# Patient Record
Sex: MALE | Race: WHITE | NOT HISPANIC OR LATINO | URBAN - METROPOLITAN AREA
[De-identification: names, ages, dates, MRNs, and addresses within clinical notes are randomized per-mention and may not be internally consistent; named-entity substitution may affect disease eponyms.]

---

## 2017-12-09 ENCOUNTER — EMERGENCY (EMERGENCY)
Facility: HOSPITAL | Age: 82
LOS: 1 days | Discharge: HOME | End: 2017-12-09
Attending: EMERGENCY MEDICINE | Admitting: EMERGENCY MEDICINE
Payer: MEDICARE

## 2017-12-09 VITALS
DIASTOLIC BLOOD PRESSURE: 77 MMHG | OXYGEN SATURATION: 100 % | SYSTOLIC BLOOD PRESSURE: 134 MMHG | TEMPERATURE: 98 F | HEART RATE: 75 BPM | RESPIRATION RATE: 18 BRPM

## 2017-12-09 VITALS
SYSTOLIC BLOOD PRESSURE: 139 MMHG | HEART RATE: 78 BPM | OXYGEN SATURATION: 97 % | WEIGHT: 130.07 LBS | TEMPERATURE: 99 F | HEIGHT: 64 IN | DIASTOLIC BLOOD PRESSURE: 67 MMHG | RESPIRATION RATE: 17 BRPM

## 2017-12-09 PROCEDURE — 70450 CT HEAD/BRAIN W/O DYE: CPT

## 2017-12-09 PROCEDURE — 70450 CT HEAD/BRAIN W/O DYE: CPT | Mod: 26

## 2017-12-09 PROCEDURE — 72125 CT NECK SPINE W/O DYE: CPT | Mod: 26

## 2017-12-09 PROCEDURE — 72125 CT NECK SPINE W/O DYE: CPT

## 2017-12-09 PROCEDURE — 99284 EMERGENCY DEPT VISIT MOD MDM: CPT | Mod: 25

## 2017-12-09 PROCEDURE — 99284 EMERGENCY DEPT VISIT MOD MDM: CPT

## 2017-12-09 NOTE — ED ADULT TRIAGE NOTE - CHIEF COMPLAINT QUOTE
"I tripped and fell going to the bathroom." denies LOC. denies blood thinners. denies nausea, vomiting, dizziness, chest pain, sob, palpitations. lac noted to forehead

## 2017-12-09 NOTE — ED PROVIDER NOTE - ENMT, MLM
Airway patent, Nasal mucosa clear. Mouth with normal mucosa. Throat has no vesicles, no oropharyngeal exudates and uvula is midline. superficial abrasion above rigth orbit

## 2017-12-09 NOTE — ED ADULT NURSE NOTE - OBJECTIVE STATEMENT
Present to ER with c/o of fall. Pt states he fell in his bathroom with a head laceration.  Pt also states he went to bathroom and might have slipped in bathroom mat and hitting his forehead. Denies any LOC, confusion, dizziness, blurry vision and chest pain.

## 2017-12-09 NOTE — ED PROVIDER NOTE - OBJECTIVE STATEMENT
88 y/o M s/p trip and fall in his bathroom with a head laceration.  Pt stats he went to bathroom and thinks he slipped on the bathroom mat, hitting his forehead on the bathroom sink.  pt denies LOC, confusion, dizziness, blurry vision, chest pain.

## 2021-10-20 ENCOUNTER — EMERGENCY (EMERGENCY)
Facility: HOSPITAL | Age: 86
LOS: 1 days | Discharge: HOME | End: 2021-10-20
Attending: EMERGENCY MEDICINE | Admitting: EMERGENCY MEDICINE
Payer: MEDICARE

## 2021-10-20 VITALS
TEMPERATURE: 99 F | DIASTOLIC BLOOD PRESSURE: 63 MMHG | HEIGHT: 64 IN | RESPIRATION RATE: 18 BRPM | WEIGHT: 184.97 LBS | SYSTOLIC BLOOD PRESSURE: 141 MMHG | OXYGEN SATURATION: 97 % | HEART RATE: 67 BPM

## 2021-10-20 VITALS
TEMPERATURE: 98 F | HEART RATE: 75 BPM | OXYGEN SATURATION: 95 % | RESPIRATION RATE: 18 BRPM | DIASTOLIC BLOOD PRESSURE: 68 MMHG | SYSTOLIC BLOOD PRESSURE: 129 MMHG

## 2021-10-20 PROBLEM — S42.90XA FRACTURE OF UNSPECIFIED SHOULDER GIRDLE, PART UNSPECIFIED, INITIAL ENCOUNTER FOR CLOSED FRACTURE: Chronic | Status: ACTIVE | Noted: 2017-12-09

## 2021-10-20 PROBLEM — C67.9 MALIGNANT NEOPLASM OF BLADDER, UNSPECIFIED: Chronic | Status: ACTIVE | Noted: 2017-12-09

## 2021-10-20 PROBLEM — Z95.5 PRESENCE OF CORONARY ANGIOPLASTY IMPLANT AND GRAFT: Chronic | Status: ACTIVE | Noted: 2017-12-09

## 2021-10-20 PROCEDURE — 99283 EMERGENCY DEPT VISIT LOW MDM: CPT | Mod: 25

## 2021-10-20 PROCEDURE — 87075 CULTR BACTERIA EXCEPT BLOOD: CPT

## 2021-10-20 PROCEDURE — 10061 I&D ABSCESS COMP/MULTIPLE: CPT

## 2021-10-20 PROCEDURE — 10060 I&D ABSCESS SIMPLE/SINGLE: CPT

## 2021-10-20 PROCEDURE — 87205 SMEAR GRAM STAIN: CPT

## 2021-10-20 PROCEDURE — 87070 CULTURE OTHR SPECIMN AEROBIC: CPT

## 2021-10-20 PROCEDURE — 87077 CULTURE AEROBIC IDENTIFY: CPT

## 2021-10-20 RX ORDER — CEFUROXIME AXETIL 250 MG
500 TABLET ORAL ONCE
Refills: 0 | Status: COMPLETED | OUTPATIENT
Start: 2021-10-20 | End: 2021-10-20

## 2021-10-20 RX ADMIN — Medication 500 MILLIGRAM(S): at 12:04

## 2021-10-20 NOTE — ED PROVIDER NOTE - PROGRESS NOTE DETAILS
spoke with ENT on call Dr. Chandler, case discuused, aware wound was packed, recommend duricef due to patient allergic to clinda and levaquin, will see in office tommorrow

## 2021-10-20 NOTE — ED PROVIDER NOTE - NSFOLLOWUPINSTRUCTIONS_ED_ALL_ED_FT
Call ENT Dr. Hurley office today to make appointment for tomorrow.    WHAT YOU NEED TO KNOW:    Epidermal inclusion cysts are the most common skin cysts in adults. These cysts are usually round, firm lumps filled with a cheese-like material called keratin. They are also called epidermoid, keratin, or sebaceous cysts. They can be found almost anywhere on your body. The cysts are most common on the face, back, neck, chest, and around your ears. They can be caused by blocked hair follicle and oil gland ducts in your skin. The cysts can grow larger and make it hard for you to sit or walk if they are on your legs or back. Epidermal inclusion cysts may grow slowly but are not cancer.    DISCHARGE INSTRUCTIONS:    Call your doctor or dermatologist if:   •Your cyst becomes swollen, red, and painful.      •Your cyst is large and leads to trouble moving or a deformed area.      •You have questions or concerns about your condition or care.      Medicines:   •Antibiotics may be given to treat or prevent an infection.      •Take your medicine as directed. Contact your healthcare provider if you think your medicine is not helping or if you have side effects. Tell him of her if you are allergic to any medicine. Keep a list of the medicines, vitamins, and herbs you take. Include the amounts, and when and why you take them. Bring the list or the pill bottles to follow-up visits. Carry your medicine list with you in case of an emergency.      Follow up with your doctor or dermatologist as directed: Write down your questions so you remember to ask them at your visits.       © Copyright [employer] 2021           back to top                          © Copyright [employer] 2021

## 2021-10-20 NOTE — ED PROVIDER NOTE - CPE EDP NEURO NORM
Medications e-scribe to pharmacy of pt's choice. Patient was put on a wait list and will be contacted to schedule their next follow up appointment once the schedule is available. If the patient is in need of an appointment before their next visit please call the office at [phone]. After Visit Summary  mailed to patient.     SL - - -

## 2021-10-20 NOTE — ED PROVIDER NOTE - CLINICAL SUMMARY MEDICAL DECISION MAKING FREE TEXT BOX
sebaceous cyst of right ear now open and oozing blood, to drain and pack, call ENT, start antibiotics

## 2021-10-20 NOTE — ED ADULT NURSE NOTE - NSICDXPASTMEDICALHX_GEN_ALL_CORE_FT
PAST MEDICAL HISTORY:  Bladder cancer     Shoulder fracture     Stented coronary artery over 15 years ago as per patient

## 2021-10-20 NOTE — ED ADULT NURSE NOTE - OBJECTIVE STATEMENT
Patient is a 89yo M, BIBA, AAOx4, in NAD, VSS, complaining of bleeding at right ear.  Patient states, "I have had a growth there for several months and today it just started bleeding."  Patient denies any pain or injury at site, falls, fevers, chills, N/V, dizziness, changes in hearing, use of blood thinners, or any other complaints at this time.

## 2021-10-20 NOTE — ED PROVIDER NOTE - OBJECTIVE STATEMENT
89 male presents to ER by ambulance from home with report of right ear bleeding, patient states he has had a lump behind his ear for the past month, but it did not bother him until it started to bleed today.
No

## 2021-10-20 NOTE — ED PROVIDER NOTE - PATIENT PORTAL LINK FT
You can access the FollowMyHealth Patient Portal offered by [facility] by registering at the following website: [email or web address]. By joining [employer]’[facility] FollowMyHealth portal, you will also be able to view your health information using other applications (apps) compatible with our system.

## 2021-10-20 NOTE — ED PROVIDER NOTE - CARE PROVIDER_API CALL
Aleksey Hurley)  Facial Plastic and Reconstructive Surgery; Otolaryngology  [address] [place] [address]  Phone: [phone]  Fax: [phone]  Follow Up Time:

## 2021-10-20 NOTE — ED ADULT NURSE NOTE - NSIMPLEMENTINTERV_GEN_ALL_ED
Implemented All Fall with Harm Risk Interventions:  [place] to call system. Call bell, personal items and telephone within reach. Instruct patient to call for assistance. Room bathroom lighting operational. Non-slip footwear when patient is off stretcher. Physically safe environment: no spills, clutter or unnecessary equipment. Stretcher in lowest position, wheels locked, appropriate side rails in place. Provide visual cue, wrist band, yellow gown, etc. Monitor gait and stability. Monitor for mental status changes and reorient to person, place, and time. Review medications for side effects contributing to fall risk. Reinforce activity limits and safety measures with patient and family. Provide visual clues: red socks.
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
## 2021-11-24 ENCOUNTER — INPATIENT (INPATIENT)
Facility: HOSPITAL | Age: 86
LOS: 5 days | Discharge: NURSING, REHAB OR LONG-TERM CARE | DRG: 872 | End: 2021-11-30
Attending: INTERNAL MEDICINE | Admitting: STUDENT IN AN ORGANIZED HEALTH CARE EDUCATION/TRAINING PROGRAM
Payer: MEDICARE

## 2021-11-24 VITALS
HEIGHT: 64 IN | WEIGHT: 125 LBS | RESPIRATION RATE: 16 BRPM | TEMPERATURE: 100 F | HEART RATE: 95 BPM | SYSTOLIC BLOOD PRESSURE: 149 MMHG | OXYGEN SATURATION: 99 % | DIASTOLIC BLOOD PRESSURE: 76 MMHG

## 2021-11-24 LAB
ALBUMIN SERPL ELPH-MCNC: 3.6 G/DL — SIGNIFICANT CHANGE UP (ref 3.3–5)
ALP SERPL-CCNC: 50 U/L — SIGNIFICANT CHANGE UP (ref 40–120)
ALT FLD-CCNC: 21 U/L — SIGNIFICANT CHANGE UP (ref 12–78)
ANION GAP SERPL CALC-SCNC: 6 MMOL/L — SIGNIFICANT CHANGE UP (ref 5–17)
ANISOCYTOSIS BLD QL: SLIGHT — SIGNIFICANT CHANGE UP
APPEARANCE UR: ABNORMAL
APTT BLD: 28.8 SEC — SIGNIFICANT CHANGE UP (ref 27.5–35.5)
AST SERPL-CCNC: 32 U/L — SIGNIFICANT CHANGE UP (ref 15–37)
BACTERIA # UR AUTO: ABNORMAL
BASOPHILS # BLD AUTO: 0 K/UL — SIGNIFICANT CHANGE UP (ref 0–0.2)
BASOPHILS NFR BLD AUTO: 0 % — SIGNIFICANT CHANGE UP (ref 0–2)
BILIRUB SERPL-MCNC: 0.5 MG/DL — SIGNIFICANT CHANGE UP (ref 0.2–1.2)
BILIRUB UR-MCNC: NEGATIVE — SIGNIFICANT CHANGE UP
BUN SERPL-MCNC: 34 MG/DL — HIGH (ref 7–23)
CALCIUM SERPL-MCNC: 9.9 MG/DL — SIGNIFICANT CHANGE UP (ref 8.5–10.1)
CHLORIDE SERPL-SCNC: 107 MMOL/L — SIGNIFICANT CHANGE UP (ref 96–108)
CO2 SERPL-SCNC: 24 MMOL/L — SIGNIFICANT CHANGE UP (ref 22–31)
COLOR SPEC: YELLOW — SIGNIFICANT CHANGE UP
COMMENT - URINE: SIGNIFICANT CHANGE UP
CREAT SERPL-MCNC: 2.2 MG/DL — HIGH (ref 0.5–1.3)
DIFF PNL FLD: ABNORMAL
EOSINOPHIL # BLD AUTO: 0 K/UL — SIGNIFICANT CHANGE UP (ref 0–0.5)
EOSINOPHIL NFR BLD AUTO: 0 % — SIGNIFICANT CHANGE UP (ref 0–6)
EPI CELLS # UR: SIGNIFICANT CHANGE UP
GLUCOSE SERPL-MCNC: 112 MG/DL — HIGH (ref 70–99)
GLUCOSE UR QL: 100 MG/DL
GRAN CASTS # UR COMP ASSIST: ABNORMAL /LPF
HCT VFR BLD CALC: 35.2 % — LOW (ref 39–50)
HGB BLD-MCNC: 11.7 G/DL — LOW (ref 13–17)
INR BLD: 1.14 RATIO — SIGNIFICANT CHANGE UP (ref 0.88–1.16)
KETONES UR-MCNC: ABNORMAL
LACTATE SERPL-SCNC: 1.2 MMOL/L — SIGNIFICANT CHANGE UP (ref 0.7–2)
LEUKOCYTE ESTERASE UR-ACNC: NEGATIVE — SIGNIFICANT CHANGE UP
LYMPHOCYTES # BLD AUTO: 0.46 K/UL — LOW (ref 1–3.3)
LYMPHOCYTES # BLD AUTO: 4 % — LOW (ref 13–44)
MACROCYTES BLD QL: SLIGHT — SIGNIFICANT CHANGE UP
MANUAL SMEAR VERIFICATION: YES — SIGNIFICANT CHANGE UP
MCHC RBC-ENTMCNC: 32.1 PG — SIGNIFICANT CHANGE UP (ref 27–34)
MCHC RBC-ENTMCNC: 33.2 GM/DL — SIGNIFICANT CHANGE UP (ref 32–36)
MCV RBC AUTO: 96.4 FL — SIGNIFICANT CHANGE UP (ref 80–100)
MICROCYTES BLD QL: SLIGHT — SIGNIFICANT CHANGE UP
MONOCYTES # BLD AUTO: 0.34 K/UL — SIGNIFICANT CHANGE UP (ref 0–0.9)
MONOCYTES NFR BLD AUTO: 3 % — SIGNIFICANT CHANGE UP (ref 2–14)
NEUTROPHILS # BLD AUTO: 10.68 K/UL — HIGH (ref 1.8–7.4)
NEUTROPHILS NFR BLD AUTO: 90 % — HIGH (ref 43–77)
NEUTS BAND # BLD: 3 % — SIGNIFICANT CHANGE UP (ref 0–8)
NITRITE UR-MCNC: NEGATIVE — SIGNIFICANT CHANGE UP
NRBC # BLD: 0 — SIGNIFICANT CHANGE UP
NRBC # BLD: SIGNIFICANT CHANGE UP /100 WBCS (ref 0–0)
PH UR: 5 — SIGNIFICANT CHANGE UP (ref 5–8)
PLAT MORPH BLD: NORMAL — SIGNIFICANT CHANGE UP
PLATELET # BLD AUTO: 211 K/UL — SIGNIFICANT CHANGE UP (ref 150–400)
POIKILOCYTOSIS BLD QL AUTO: SLIGHT — SIGNIFICANT CHANGE UP
POTASSIUM SERPL-MCNC: 4.3 MMOL/L — SIGNIFICANT CHANGE UP (ref 3.5–5.3)
POTASSIUM SERPL-SCNC: 4.3 MMOL/L — SIGNIFICANT CHANGE UP (ref 3.5–5.3)
PROT SERPL-MCNC: 8.3 G/DL — SIGNIFICANT CHANGE UP (ref 6–8.3)
PROT UR-MCNC: 100
PROTHROM AB SERPL-ACNC: 13.3 SEC — SIGNIFICANT CHANGE UP (ref 10.6–13.6)
RBC # BLD: 3.65 M/UL — LOW (ref 4.2–5.8)
RBC # FLD: 19.8 % — HIGH (ref 10.3–14.5)
RBC BLD AUTO: SIGNIFICANT CHANGE UP
RBC CASTS # UR COMP ASSIST: SIGNIFICANT CHANGE UP /HPF (ref 0–4)
SCHISTOCYTES BLD QL AUTO: SLIGHT — SIGNIFICANT CHANGE UP
SODIUM SERPL-SCNC: 137 MMOL/L — SIGNIFICANT CHANGE UP (ref 135–145)
SP GR SPEC: 1.02 — SIGNIFICANT CHANGE UP (ref 1.01–1.02)
UROBILINOGEN FLD QL: NEGATIVE — SIGNIFICANT CHANGE UP
WBC # BLD: 11.48 K/UL — HIGH (ref 3.8–10.5)
WBC # FLD AUTO: 11.48 K/UL — HIGH (ref 3.8–10.5)
WBC UR QL: SIGNIFICANT CHANGE UP

## 2021-11-24 PROCEDURE — 71045 X-RAY EXAM CHEST 1 VIEW: CPT | Mod: 26

## 2021-11-24 PROCEDURE — 99285 EMERGENCY DEPT VISIT HI MDM: CPT

## 2021-11-24 PROCEDURE — 74176 CT ABD & PELVIS W/O CONTRAST: CPT | Mod: 26,ME

## 2021-11-24 PROCEDURE — 70450 CT HEAD/BRAIN W/O DYE: CPT | Mod: 26,ME

## 2021-11-24 PROCEDURE — G1004: CPT

## 2021-11-24 RX ORDER — SODIUM CHLORIDE 9 MG/ML
1000 INJECTION INTRAMUSCULAR; INTRAVENOUS; SUBCUTANEOUS ONCE
Refills: 0 | Status: COMPLETED | OUTPATIENT
Start: 2021-11-24 | End: 2021-11-24

## 2021-11-24 RX ORDER — SODIUM CHLORIDE 9 MG/ML
1000 INJECTION, SOLUTION INTRAVENOUS
Refills: 0 | Status: DISCONTINUED | OUTPATIENT
Start: 2021-11-24 | End: 2021-11-26

## 2021-11-24 RX ORDER — ACETAMINOPHEN 500 MG
650 TABLET ORAL ONCE
Refills: 0 | Status: COMPLETED | OUTPATIENT
Start: 2021-11-24 | End: 2021-11-24

## 2021-11-24 RX ORDER — CEFTRIAXONE 500 MG/1
1000 INJECTION, POWDER, FOR SOLUTION INTRAMUSCULAR; INTRAVENOUS ONCE
Refills: 0 | Status: COMPLETED | OUTPATIENT
Start: 2021-11-24 | End: 2021-11-24

## 2021-11-24 RX ADMIN — CEFTRIAXONE 100 MILLIGRAM(S): 500 INJECTION, POWDER, FOR SOLUTION INTRAMUSCULAR; INTRAVENOUS at 23:59

## 2021-11-24 RX ADMIN — Medication 650 MILLIGRAM(S): at 22:25

## 2021-11-24 RX ADMIN — SODIUM CHLORIDE 1000 MILLILITER(S): 9 INJECTION INTRAMUSCULAR; INTRAVENOUS; SUBCUTANEOUS at 22:26

## 2021-11-24 RX ADMIN — Medication 650 MILLIGRAM(S): at 23:20

## 2021-11-24 RX ADMIN — SODIUM CHLORIDE 1000 MILLILITER(S): 9 INJECTION INTRAMUSCULAR; INTRAVENOUS; SUBCUTANEOUS at 23:50

## 2021-11-24 NOTE — ED ADULT NURSE NOTE - NSICDXPASTMEDICALHX_GEN_ALL_CORE_FT
PAST MEDICAL HISTORY:  Bladder cancer     Parkinsons     Shoulder fracture     Stented coronary artery over 15 years ago as per patient

## 2021-11-24 NOTE — ED ADULT NURSE NOTE - NS ED NURSE RECORD ANOTHER VITAL SIGN
Yes D/w Cardio fellow  - likely rhabdo, but feels pt warrants tele monitoring. admit to cardiac tele Initially d/w Cardio fellow  - likely rhabdo, but feels pt warrants tele monitoring. however w/ elevated BHB, which can be starvation ketosis, less likely DKA, will call MICU for possible 7 lachman admission. MICU consulted and will see the pt No urine output on primafit. Will place Sommers monitoring of urine output in the setting of severe dehydration / rhabdo cleo: pt received at sign out from dr lainez as pending micu eval - declined tele setting, cards fellow accepted to cards tele for monitoring

## 2021-11-24 NOTE — ED PROVIDER NOTE - OBJECTIVE STATEMENT
89yo male bib ems s/p fall. pt was sitting in the dining room and the wife found hmi on the floor, unknown LOC, pt unable to get up , pt has no pain, no dizziness, no vomiting no cough or fever

## 2021-11-24 NOTE — ED ADULT NURSE NOTE - OBJECTIVE STATEMENT
Pt Biba from home for syncopal episode, witnessed by wife. Pt was walking and fell, doesn't recall event. Denies Head injury, no visible wounds to head or body. On arrival pt appears weak.

## 2021-11-24 NOTE — PROGRESS NOTE ADULT - SUBJECTIVE AND OBJECTIVE BOX
Consulted for elevated BUN/Cr    Chart reviewed    Unknown baseline creatinine  Check urine indices  Renal imaging  IVF trial with LR  Repeat labs in AM    Full consult note to follow, thank you

## 2021-11-25 DIAGNOSIS — R79.89 OTHER SPECIFIED ABNORMAL FINDINGS OF BLOOD CHEMISTRY: ICD-10-CM
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
## 2021-11-25 DIAGNOSIS — Z98.890 OTHER SPECIFIED POSTPROCEDURAL STATES: Chronic | ICD-10-CM

## 2021-11-25 DIAGNOSIS — R55 SYNCOPE AND COLLAPSE: ICD-10-CM

## 2021-11-25 DIAGNOSIS — R82.90 UNSPECIFIED ABNORMAL FINDINGS IN URINE: ICD-10-CM

## 2021-11-25 DIAGNOSIS — N39.0 URINARY TRACT INFECTION, SITE NOT SPECIFIED: ICD-10-CM

## 2021-11-25 DIAGNOSIS — K52.9 NONINFECTIVE GASTROENTERITIS AND COLITIS, UNSPECIFIED: ICD-10-CM

## 2021-11-25 DIAGNOSIS — Z29.9 ENCOUNTER FOR PROPHYLACTIC MEASURES, UNSPECIFIED: ICD-10-CM

## 2021-11-25 DIAGNOSIS — N17.9 ACUTE KIDNEY FAILURE, UNSPECIFIED: ICD-10-CM

## 2021-11-25 LAB
ALBUMIN SERPL ELPH-MCNC: 3.2 G/DL — LOW (ref 3.3–5)
ALP SERPL-CCNC: 43 U/L — SIGNIFICANT CHANGE UP (ref 40–120)
ALT FLD-CCNC: 18 U/L — SIGNIFICANT CHANGE UP (ref 12–78)
ANION GAP SERPL CALC-SCNC: 4 MMOL/L — LOW (ref 5–17)
APPEARANCE UR: ABNORMAL
AST SERPL-CCNC: 27 U/L — SIGNIFICANT CHANGE UP (ref 15–37)
BACTERIA # UR AUTO: ABNORMAL
BASOPHILS # BLD AUTO: 0.01 K/UL — SIGNIFICANT CHANGE UP (ref 0–0.2)
BASOPHILS NFR BLD AUTO: 0.1 % — SIGNIFICANT CHANGE UP (ref 0–2)
BILIRUB SERPL-MCNC: 0.4 MG/DL — SIGNIFICANT CHANGE UP (ref 0.2–1.2)
BILIRUB UR-MCNC: NEGATIVE — SIGNIFICANT CHANGE UP
BUN SERPL-MCNC: 30 MG/DL — HIGH (ref 7–23)
CALCIUM SERPL-MCNC: 9.1 MG/DL — SIGNIFICANT CHANGE UP (ref 8.5–10.1)
CHLORIDE SERPL-SCNC: 108 MMOL/L — SIGNIFICANT CHANGE UP (ref 96–108)
CHLORIDE UR-SCNC: 99 MMOL/L — SIGNIFICANT CHANGE UP
CO2 SERPL-SCNC: 26 MMOL/L — SIGNIFICANT CHANGE UP (ref 22–31)
COLOR SPEC: YELLOW — SIGNIFICANT CHANGE UP
COMMENT - URINE: SIGNIFICANT CHANGE UP
COMMENT - URINE: SIGNIFICANT CHANGE UP
CREAT ?TM UR-MCNC: 65 MG/DL — SIGNIFICANT CHANGE UP
CREAT SERPL-MCNC: 1.9 MG/DL — HIGH (ref 0.5–1.3)
DIFF PNL FLD: ABNORMAL
EOSINOPHIL # BLD AUTO: 0.02 K/UL — SIGNIFICANT CHANGE UP (ref 0–0.5)
EOSINOPHIL NFR BLD AUTO: 0.2 % — SIGNIFICANT CHANGE UP (ref 0–6)
EPI CELLS # UR: SIGNIFICANT CHANGE UP
GLUCOSE SERPL-MCNC: 100 MG/DL — HIGH (ref 70–99)
GLUCOSE UR QL: 100 MG/DL
GRAN CASTS # UR COMP ASSIST: ABNORMAL /LPF
HCT VFR BLD CALC: 32 % — LOW (ref 39–50)
HGB BLD-MCNC: 10.6 G/DL — LOW (ref 13–17)
IMM GRANULOCYTES NFR BLD AUTO: 7.1 % — HIGH (ref 0–1.5)
KETONES UR-MCNC: ABNORMAL
LEUKOCYTE ESTERASE UR-ACNC: NEGATIVE — SIGNIFICANT CHANGE UP
LYMPHOCYTES # BLD AUTO: 0.79 K/UL — LOW (ref 1–3.3)
LYMPHOCYTES # BLD AUTO: 7.6 % — LOW (ref 13–44)
MAGNESIUM SERPL-MCNC: 2.3 MG/DL — SIGNIFICANT CHANGE UP (ref 1.6–2.6)
MCHC RBC-ENTMCNC: 31.9 PG — SIGNIFICANT CHANGE UP (ref 27–34)
MCHC RBC-ENTMCNC: 33.1 GM/DL — SIGNIFICANT CHANGE UP (ref 32–36)
MCV RBC AUTO: 96.4 FL — SIGNIFICANT CHANGE UP (ref 80–100)
MONOCYTES # BLD AUTO: 1.7 K/UL — HIGH (ref 0–0.9)
MONOCYTES NFR BLD AUTO: 16.4 % — HIGH (ref 2–14)
NEUTROPHILS # BLD AUTO: 7.1 K/UL — SIGNIFICANT CHANGE UP (ref 1.8–7.4)
NEUTROPHILS NFR BLD AUTO: 68.6 % — SIGNIFICANT CHANGE UP (ref 43–77)
NITRITE UR-MCNC: NEGATIVE — SIGNIFICANT CHANGE UP
NRBC # BLD: 0 /100 WBCS — SIGNIFICANT CHANGE UP (ref 0–0)
OSMOLALITY UR: 504 MOSM/KG — SIGNIFICANT CHANGE UP (ref 50–1200)
PH UR: 5 — SIGNIFICANT CHANGE UP (ref 5–8)
PHOSPHATE SERPL-MCNC: 2.1 MG/DL — LOW (ref 2.5–4.5)
PLATELET # BLD AUTO: 191 K/UL — SIGNIFICANT CHANGE UP (ref 150–400)
POTASSIUM SERPL-MCNC: 3.6 MMOL/L — SIGNIFICANT CHANGE UP (ref 3.5–5.3)
POTASSIUM SERPL-SCNC: 3.6 MMOL/L — SIGNIFICANT CHANGE UP (ref 3.5–5.3)
PROT ?TM UR-MCNC: 88 MG/DL — HIGH (ref 0–12)
PROT SERPL-MCNC: 7 G/DL — SIGNIFICANT CHANGE UP (ref 6–8.3)
PROT UR-MCNC: 100
PROT/CREAT UR-RTO: 1.4 RATIO — HIGH (ref 0–0.2)
RBC # BLD: 3.32 M/UL — LOW (ref 4.2–5.8)
RBC # FLD: 19.8 % — HIGH (ref 10.3–14.5)
RBC CASTS # UR COMP ASSIST: SIGNIFICANT CHANGE UP /HPF (ref 0–4)
SARS-COV-2 RNA SPEC QL NAA+PROBE: SIGNIFICANT CHANGE UP
SODIUM SERPL-SCNC: 138 MMOL/L — SIGNIFICANT CHANGE UP (ref 135–145)
SODIUM UR-SCNC: 105 MMOL/L — SIGNIFICANT CHANGE UP
SP GR SPEC: 1.02 — SIGNIFICANT CHANGE UP (ref 1.01–1.02)
UROBILINOGEN FLD QL: NEGATIVE — SIGNIFICANT CHANGE UP
UUN UR-MCNC: 611 MG/DL — SIGNIFICANT CHANGE UP
WBC # BLD: 10.36 K/UL — SIGNIFICANT CHANGE UP (ref 3.8–10.5)
WBC # FLD AUTO: 10.36 K/UL — SIGNIFICANT CHANGE UP (ref 3.8–10.5)
WBC UR QL: SIGNIFICANT CHANGE UP

## 2021-11-25 PROCEDURE — 99223 1ST HOSP IP/OBS HIGH 75: CPT | Mod: GC

## 2021-11-25 PROCEDURE — 99222 1ST HOSP IP/OBS MODERATE 55: CPT

## 2021-11-25 PROCEDURE — 76775 US EXAM ABDO BACK WALL LIM: CPT | Mod: 26

## 2021-11-25 RX ORDER — HEPARIN SODIUM 5000 [USP'U]/ML
5000 INJECTION INTRAVENOUS; SUBCUTANEOUS EVERY 12 HOURS
Refills: 0 | Status: DISCONTINUED | OUTPATIENT
Start: 2021-11-25 | End: 2021-11-26

## 2021-11-25 RX ORDER — SODIUM,POTASSIUM PHOSPHATES 278-250MG
1 POWDER IN PACKET (EA) ORAL
Refills: 0 | Status: DISCONTINUED | OUTPATIENT
Start: 2021-11-25 | End: 2021-11-25

## 2021-11-25 RX ORDER — SODIUM,POTASSIUM PHOSPHATES 278-250MG
1 POWDER IN PACKET (EA) ORAL EVERY 6 HOURS
Refills: 0 | Status: COMPLETED | OUTPATIENT
Start: 2021-11-25 | End: 2021-11-26

## 2021-11-25 RX ORDER — ACETAMINOPHEN 500 MG
650 TABLET ORAL EVERY 6 HOURS
Refills: 0 | Status: DISCONTINUED | OUTPATIENT
Start: 2021-11-25 | End: 2021-11-30

## 2021-11-25 RX ORDER — SODIUM CHLORIDE 9 MG/ML
1000 INJECTION, SOLUTION INTRAVENOUS
Refills: 0 | Status: DISCONTINUED | OUTPATIENT
Start: 2021-11-25 | End: 2021-11-26

## 2021-11-25 RX ORDER — LANOLIN ALCOHOL/MO/W.PET/CERES
3 CREAM (GRAM) TOPICAL AT BEDTIME
Refills: 0 | Status: DISCONTINUED | OUTPATIENT
Start: 2021-11-25 | End: 2021-11-30

## 2021-11-25 RX ORDER — SACCHAROMYCES BOULARDII 250 MG
250 POWDER IN PACKET (EA) ORAL
Refills: 0 | Status: DISCONTINUED | OUTPATIENT
Start: 2021-11-25 | End: 2021-11-30

## 2021-11-25 RX ORDER — METRONIDAZOLE 500 MG
500 TABLET ORAL EVERY 8 HOURS
Refills: 0 | Status: DISCONTINUED | OUTPATIENT
Start: 2021-11-25 | End: 2021-11-27

## 2021-11-25 RX ORDER — CEFTRIAXONE 500 MG/1
1000 INJECTION, POWDER, FOR SOLUTION INTRAMUSCULAR; INTRAVENOUS EVERY 24 HOURS
Refills: 0 | Status: DISCONTINUED | OUTPATIENT
Start: 2021-11-25 | End: 2021-11-27

## 2021-11-25 RX ADMIN — Medication 1 PACKET(S): at 11:09

## 2021-11-25 RX ADMIN — Medication 250 MILLIGRAM(S): at 17:05

## 2021-11-25 RX ADMIN — SODIUM CHLORIDE 75 MILLILITER(S): 9 INJECTION, SOLUTION INTRAVENOUS at 14:20

## 2021-11-25 RX ADMIN — Medication 1 PACKET(S): at 17:05

## 2021-11-25 RX ADMIN — HEPARIN SODIUM 5000 UNIT(S): 5000 INJECTION INTRAVENOUS; SUBCUTANEOUS at 06:26

## 2021-11-25 RX ADMIN — Medication 100 MILLIGRAM(S): at 06:25

## 2021-11-25 RX ADMIN — CEFTRIAXONE 100 MILLIGRAM(S): 500 INJECTION, POWDER, FOR SOLUTION INTRAMUSCULAR; INTRAVENOUS at 23:43

## 2021-11-25 RX ADMIN — Medication 650 MILLIGRAM(S): at 05:10

## 2021-11-25 RX ADMIN — Medication 100 MILLIGRAM(S): at 21:46

## 2021-11-25 RX ADMIN — Medication 650 MILLIGRAM(S): at 04:46

## 2021-11-25 RX ADMIN — Medication 250 MILLIGRAM(S): at 06:25

## 2021-11-25 RX ADMIN — HEPARIN SODIUM 5000 UNIT(S): 5000 INJECTION INTRAVENOUS; SUBCUTANEOUS at 17:05

## 2021-11-25 RX ADMIN — Medication 100 MILLIGRAM(S): at 13:06

## 2021-11-25 RX ADMIN — SODIUM CHLORIDE 75 MILLILITER(S): 9 INJECTION, SOLUTION INTRAVENOUS at 00:10

## 2021-11-25 RX ADMIN — CEFTRIAXONE 1000 MILLIGRAM(S): 500 INJECTION, POWDER, FOR SOLUTION INTRAMUSCULAR; INTRAVENOUS at 00:30

## 2021-11-25 NOTE — H&P ADULT - PROBLEM SELECTOR PLAN 2
Patient came in after a fall from chair. Patient unaware of what happened. Mechanical fall vs. syncope. CT head showed no acute pathology.   - Continue to monitor   - Orthostatic Vital signs  - Fall precautious   - TELE monitor Patient came in after a fall from chair. Patient states that he lost balance and fell backward but does not remember details of what happened after. Mechanical fall vs. syncope. CT head showed no acute pathology - lateral ventricles dilated out of proportion to the sulcal prominence which could be due to central atrophy versus normal pressure hydrocephalus.  - Continue to monitor   - Orthostatic Vital signs  - Fall precautious   - TELE monitor, check CE's  - Cardiac murmur on exam - check 2D ECHO  - Neurology Consulted Dr. Rudolph  - PT evaluation

## 2021-11-25 NOTE — H&P ADULT - NSHPPHYSICALEXAM_GEN_ALL_CORE
VITALS:   T(C): 38 (11-24-21 @ 23:20), Max: 39.5 (11-24-21 @ 21:40)  HR: 85 (11-24-21 @ 23:20) (85 - 95)  BP: 111/56 (11-24-21 @ 23:20) (111/56 - 149/76)  RR: 16 (11-24-21 @ 23:20) (16 - 16)  SpO2: 97% (11-24-21 @ 23:20) (97% - 99%)    GENERAL: NAD, lying in bed comfortably  HEAD:  Atraumatic, Normocephalic  EYES: EOMI, PERRLA, conjunctiva and sclera clear  ENT: Moist mucous membranes  NECK: Supple, No JVD  CHEST/LUNG: Clear to auscultation bilaterally; No rales, rhonchi, wheezing, or rubs. Unlabored respirations  HEART: Regular rate and rhythm; No murmurs, rubs, or gallops  ABDOMEN: BSx4; Tender to palpation   EXTREMITIES:  2+ Peripheral Pulses, brisk capillary refill. No clubbing, cyanosis, or trace edema   NERVOUS SYSTEM:  A&Ox3, no focal deficits   SKIN: No rashes or lesions T(C): 38 (11-24-21 @ 23:20), Max: 39.5 (11-24-21 @ 21:40)  HR: 85 (11-24-21 @ 23:20) (85 - 95)  BP: 111/56 (11-24-21 @ 23:20) (111/56 - 149/76)  RR: 16 (11-24-21 @ 23:20) (16 - 16)  SpO2: 97% (11-24-21 @ 23:20) (97% - 99%)    GENERAL: NAD, lying in bed comfortably  HEAD:  Atraumatic, Normocephalic  EYES: EOMI, PERRLA, conjunctiva and sclera clear  ENT: Moist mucous membranes  NECK: Supple, No JVD  CHEST/LUNG: Clear to auscultation bilaterally; No rales, rhonchi, wheezing, or rubs. Unlabored respirations  HEART: Regular rate and rhythm; Systolic Murmur  ABDOMEN: BSx4; Tender to palpation   EXTREMITIES: 2+ Peripheral Pulses, brisk capillary refill. No clubbing, cyanosis, or trace edema   NERVOUS SYSTEM:  A&Ox3, no focal deficits   SKIN: No rashes or lesions

## 2021-11-25 NOTE — H&P ADULT - NSHPSOCIALHISTORY_GEN_ALL_CORE
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

## 2021-11-25 NOTE — PATIENT PROFILE ADULT - FUNCTIONAL SCREEN CURRENT LEVEL: COMMUNICATION, MLM
2 = difficulty understanding (not related to language barrier) Hears and communicates without difficulty as long as he can understand what you say/0 = understands/communicates without difficulty

## 2021-11-25 NOTE — CONSULT NOTE ADULT - SUBJECTIVE AND OBJECTIVE BOX
[facility]  INFECTIOUS DISEASES   [address] [place] [address]  Tel: [phone]     Fax: [phone]  ======================================================  MD Ioana Villanueva Kaushal, MD Cho, Michelle, MD   ======================================================    [id number]  HAYDER ANISHAAR     CC: Fall, Sigmoid Colitis     HPI:  89 yo man with PMH of CAD, Parkinson's Disease and Bladder cancer s/p tumor resection was admitted after a fall. No LOC. He denies any symptoms except for weakness and mild lower abdominal pain. No nausea, vomiting, diarrhea or dysuria. Had some work up done, showed possible left sided colitis. UA negative, Tmax in ED was 103 but he didn't notice high fever. No chills.   He has been started on ceftriaxone and metronidazole and ID was called for further recommendations.     PAST MEDICAL & SURGICAL HISTORY:  Shoulder fracture  Bladder cancer  Stented coronary artery  over 15 years ago as per patient  Parkinsons  S/P cardiac catheterization    Social Hx: no smoking, ETOH or drugs     FAMILY HISTORY:  FH: HTN (hypertension)    Allergies  clindamycin (Unknown)  Levaquin (Unknown)    Antibiotics:  cefTRIAXone   IVPB 1000 milliGRAM(s) IV Intermittent every 24 hours  metroNIDAZOLE  IVPB 500 milliGRAM(s) IV Intermittent every 8 hours     REVIEW OF SYSTEMS:  CONSTITUTIONAL:  No Fever or chills, weakness   HEENT:  No diplopia or blurred vision.  No sore throat or runny nose.  CARDIOVASCULAR:  No chest pain or SOB.  RESPIRATORY:  No cough, shortness of breath, PND or orthopnea.  GASTROINTESTINAL:  No nausea, vomiting or diarrhea. + abdominal discomfort  GENITOURINARY:  No dysuria, frequency or urgency. No Blood in urine  MUSCULOSKELETAL:  no joint aches, no muscle pain  SKIN:  No change in skin, hair or nails.  NEUROLOGIC:  No paresthesias, fasciculations, seizures or weakness.  PSYCHIATRIC:  No disorder of thought or mood.  ENDOCRINE:  No heat or cold intolerance, polyuria or polydipsia.  HEMATOLOGICAL:  No easy bruising or bleeding.     Physical Exam:  Vital Signs Last 24 Hrs  T(C): 38.1 (2021 03:35), Max: 39.5 (2021 21:40)  T(F): 100.5 (2021 03:35), Max: 103.1 (2021 21:40)  HR: 78 (2021 03:35) (78 - 95)  BP: 120/59 (2021 03:35) (111/56 - 149/76)  RR: 17 (:35) (16 - 18)  SpO2: 96% (:35) (96% - 99%)  Height (cm): 167.6 ( @ 03:35)  Weight (kg): 55.6 ( @ 03:35)  BMI (kg/m2): 19.8 ( @ 03:35)  BSA (m2): 1.62 ( @ 03:35)  GEN: NAD  HEENT: normocephalic and atraumatic. EOMI. PERRL.    NECK: Supple.  No lymphadenopathy   LUNGS: Clear to auscultation.  HEART: Regular rate and rhythm   ABDOMEN: Soft and nondistended.  Positive bowel sounds. mild lower abdominal tenderness   : No CVA tenderness  EXTREMITIES: Without any cyanosis, clubbing, rash, lesions or edema.  NEUROLOGIC: grossly intact.  PSYCHIATRIC: Appropriate affect .  SKIN: No rash     Labs:      138  |  108  |  30<H>  ----------------------------<  100<H>  3.6   |  26  |  1.90<H>    Ca    9.1      2021 05:13  Phos  2.1       Mg     2.3         TPro  7.0  /  Alb  3.2<L>  /  TBili  0.4  /  DBili  x   /  AST  27  /  ALT  18  /  AlkPhos  43                          10.6   10.36 )-----------( 191      ( 2021 05:13 )             32.0     PT/INR - ( 2021 22:27 )   PT: 13.3 sec;   INR: 1.14 ratio    PTT - ( 2021 22:27 )  PTT:28.8 sec  Urinalysis Basic - ( 2021 03:24 )    Color: Yellow / Appearance: Slightly Turbid / S.020 / pH: x  Gluc: x / Ketone: Small  / Bili: Negative / Urobili: Negative   Blood: x / Protein: 100 / Nitrite: Negative   Leuk Esterase: Negative / RBC: 0-2 /HPF / WBC 0-2   Sq Epi: x / Non Sq Epi: Few / Bacteria: Moderate    LIVER FUNCTIONS - ( 2021 05:13 )  Alb: 3.2 g/dL / Pro: 7.0 g/dL / ALK PHOS: 43 U/L / ALT: 18 U/L / AST: 27 U/L / GGT: x           CARDIAC MARKERS ( 2021 22:27 )  x     / x     / 185 U/L / x     / 1.5 ng/mL    COVID-19 PCR: NotDetec (21 @ 22:27)    All imaging and other data have been reviewed.  < from: CT Renal [place] (21 @ 23:16) >  IMPRESSION:  1. No nephrolithiasis, hydronephrosis or obstructive uropathy.  2. Mid to distal sigmoid colitis which is infectious, inflammatory or ischemic in etiology.    Assessment and Plan:   89 yo man with PMH of CAD, Parkinson's Disease and Bladder cancer s/p tumor resection was admitted after a fall. No LOC. He denies any symptoms except for weakness and mild lower abdominal pain. No nausea, vomiting, diarrhea or dysuria. Had some work up done, showed possible left sided colitis. Fall could be related to infectious process, since he had fever and colitis, will need to be ruled out for bacteremia.   UA negative  Tmax 103.1  Mild leukocytosis 10.4  CT with mid to distal sigmoid colitis     1- Fever and colitis  - Blood culture x 2   - UA negative   - Agree with Ceftriaxone and metronidazole for now, due to PCN allergy, no reaction   - GI/surgery consults?   - Trend Creat to adjust ABx doses, today 1.9, renal on board     2- Fall  - Neurology consult  - Head CT noted  - Possibly 2ndry to infectious process and high fever in eledely    Thank you for courtesy of this consult.     Will follow.  Discussed with the primary team.     Mario Anderson MD  Division of Infectious Diseases   Cell [phone] between 8am and 6pm   After 6pm and weekends please call ID service at [phone].

## 2021-11-25 NOTE — H&P ADULT - PROBLEM SELECTOR PLAN 1
Patient came in for fall vs syncope. Patient does not have any N/V/D. On PE patient had fever of 103 abdomen tender to palpation. Labs show elevated wbc 11. 48. Lactate WNL 1.2. CT scan showed Mid to distal sigmoid colitis which is infectious, inflammatory or ischemic in etiology.  - C/W Rocephin   - C/W Lactate Ringers @ 75mL/hr   - F/U GI PCR   - F/U BC x 2  - Tylenol PRN for fever   - Infectious Disease consulted (Dr. Anderson); F/U Recommendations Patient came in for fall vs syncope. Patient does not have any N/V/D. On PE patient had fever of 103 abdomen tender to palpation. Labs show elevated wbc 11.48. Lactate WNL 1.2. CT scan showed Mid to distal sigmoid colitis which is infectious, inflammatory or ischemic in etiology.  - C/W Rocephin & Flagyl for now  - Meeting sepsis criteria on admission (fever, HR >90, colitis)  - C/W Lactated Ringers @ 75mL/hr   - F/U GI PCR if diarrhea is present (patient denies)  - F/U BC x 2  - Tylenol PRN for fever   - Infectious Disease consulted (Dr. Anderson); F/U Recommendations

## 2021-11-25 NOTE — CONSULT NOTE ADULT - SUBJECTIVE AND OBJECTIVE BOX
Patient is a 89y old  Male who presents with a chief complaint of Sigmoid Colitis (2021 10:57)       HPI:  89 yo M w/ PMHx/ Parkinson's Disease (on no medications), CAD (s/p stent), Bladder cancer (s/p tumor resection) presented to the ED after a fall. Patient was sitting on a rolling chair and fell over. Patient states that he remembers the fall - no dizziness or prodromal symptoms. States that he lost his balance and fell backwards. However, wife at bedside states that she went to go see him and found him on the floor which is when she called 911 and was brought the the ED. Patient does not recall if he hit his head, or LOC but states that after he felt dizziness. Patient's CT abdomen showed sigmoid colitis however patient not complaining of N/V/D but came in with a temperature of 103. Patient denies dizziness at this point, CP or SOB   Of note, Patient lives with wife, and independent at baseline. Patient is not vaccinated for COVID-19 or influenza.  Patient is poor historian.  Denies SOB/N/V.  States he is urinating. Unsure if he has seen nephrologist in the past.    PAST MEDICAL & SURGICAL HISTORY:  Shoulder fracture    Bladder cancer    Stented coronary artery  over 15 years ago as per patient    Parkinsons    S/P cardiac catheterization         FAMILY HISTORY:  FH: HTN (hypertension)    [place]    Social History:Non smoker    MEDICATIONS  (STANDING):  cefTRIAXone   IVPB 1000 milliGRAM(s) IV Intermittent every 24 hours  heparin   Injectable 5000 Unit(s) SubCutaneous every 12 hours  lactated ringers. 1000 milliLiter(s) (75 mL/Hr) IV Continuous <Continuous>  metroNIDAZOLE  IVPB 500 milliGRAM(s) IV Intermittent every 8 hours  potassium phosphate / sodium phosphate Powder (PHOS-NaK) 1 Packet(s) Oral every 6 hours  saccharomyces boulardii 250 milliGRAM(s) Oral two times a day    MEDICATIONS  (PRN):  acetaminophen     Tablet .. 650 milliGRAM(s) Oral every 6 hours PRN Temp greater or equal to 38C (100.4F), Mild Pain (1 - 3)  melatonin 3 milliGRAM(s) Oral at bedtime PRN Insomnia   Meds reviewed    Allergies    clindamycin (Unknown)  Levaquin (Unknown)    Intolerances         REVIEW OF SYSTEMS:    Review of Systems:   Constitutional: Denies fatigue  HEENT: Denies headaches and dizziness  Respiratory: denies SOB, cough, or wheezing  Cardiovascular: denies CP, palpitations  Gastrointestinal: Denies nausea, denies vomiting, diarrhea, constipation, abdominal pain, or bloody stools  Genitourinary: denies painful urination, increased frequency, urgency, or bloody urine  Skin: denies rashes or itching  Musculoskeletal: denies muscle aches, joint swelling  Neurologic: Denies generalized weakness, denies loss of sensation, numbness, or tingling      Vital Signs Last 24 Hrs  T(C): 38.1 (2021 03:35), Max: 39.5 (2021 21:40)  T(F): 100.5 (2021 03:35), Max: 103.1 (2021 21:40)  HR: 78 (2021 03:35) (78 - 95)  BP: 120/59 (2021 03:35) (111/56 - 149/76)  BP(mean): --  RR: 17 (2021 03:35) (16 - 18)  SpO2: 96% (2021 03:35) (96% - 99%)  Daily Height in cm: 167.64 (2021 03:35)    Daily Weight in k.6 (2021 03:35)    PHYSICAL EXAM:    GENERAL: NAD  HEAD:  Atraumatic, Normocephalic  EYES: EOMI, conjunctiva and sclera clear, [place]  ENMT: No Drainage from nares, No drainage from ears  NECK: Supple, neck  veins full  NERVOUS SYSTEM:  Awake and Alert  CHEST/LUNG: Clear to percussion bilaterally; No rales, rhonchi, wheezing, or rubs  HEART: Regular rate and rhythm; No murmurs, rubs, or gallops  ABDOMEN: Soft, Nontender, Nondistended; Bowel sounds present  EXTREMITIES:  No Edema  SKIN: No rashes No obvious ecchymosis      LABS:                        10.6   10.36 )-----------( 191      ( 2021 05:13 )             32.0         138  |  108  |  30<H>  ----------------------------<  100<H>  3.6   |  26  |  1.90<H>    Ca    9.1      2021 05:13  Phos  2.1       Mg     2.3         TPro  7.0  /  Alb  3.2<L>  /  TBili  0.4  /  DBili  x   /  AST  27  /  ALT  18  /  AlkPhos  43      PT/INR - ( 2021 22:27 )   PT: 13.3 sec;   INR: 1.14 ratio         PTT - ( 2021 22:27 )  PTT:28.8 sec  Urinalysis Basic - ( 2021 03:24 )    Color: Yellow / Appearance: Slightly Turbid / S.020 / pH: x  Gluc: x / Ketone: Small  / Bili: Negative / Urobili: Negative   Blood: x / Protein: 100 / Nitrite: Negative   Leuk Esterase: Negative / RBC: 0-2 /HPF / WBC 0-2   Sq Epi: x / Non Sq Epi: Few / Bacteria: Moderate      Magnesium, Serum: 2.3 mg/dL ( @ 05:13)  Phosphorus Level, Serum: 2.1 mg/dL ( @ 05:13)          RADIOLOGY & ADDITIONAL TESTS:

## 2021-11-25 NOTE — H&P ADULT - PROBLEM SELECTOR PLAN 5
- Heparin 5000 VTE ppx: Heparin 5000U subQ  - fall/aspiration precautions  - GOC discussed with patient: States that he has not thought of this before but had been meaning to discuss with his family. Would like to make a family decision. Full Code for now.

## 2021-11-25 NOTE — ED ADULT NURSE REASSESSMENT NOTE - NS ED NURSE REASSESS COMMENT FT1
2317 return from [facility]. No distress.  2320 vital signs taken temp 100.4 (had received tylenol Temp 103. per report). pt is A&Ox3. No c/o pain. pt.s wife present at bedside  2350 IV changed from 0.9 NS to Lactated Ringers 1000ml @ 75 ml/hr & in progress via pump.. Monitored. safety/fall precautions initiated as per protocol

## 2021-11-25 NOTE — H&P ADULT - HISTORY OF PRESENT ILLNESS
89 yo M w/ PMHx/ Parkinson's Disease (on no medications) presented to the ED after a fall. Patient was sitting on a rolling chair and fell over. Patient does not recall how he fell and if he had prodromal symptoms. However, wife at bedside states that she went to go see him and found him on the floor which is when she called 911 and was brought the the ED. Patient does not recall if he hit his head, LOC but states that after her felt dizziness. Patient denies dizziness at this point, N/V/D, CP or SOB.     Of note, Patient lives with wife, and independent at baseline. Patient not covid or influenza vaccinated    In the ED,   Vitals: T: 103.1, BP: 111/56, RR: 16, O2: 97%   Labs: WBC: 11.48, H/H 11.7/34.2, BUN/Cr 34/2.2 , eGFR 25, proBNP 1008  89 yo M w/ PMHx/ Parkinson's Disease (on no medications) presented to the ED after a fall. Patient was sitting on a rolling chair and fell over. Patient does not recall how he fell and if he had prodromal symptoms. However, wife at bedside states that she went to go see him and found him on the floor which is when she called 911 and was brought the the ED. Patient does not recall if he hit his head, LOC but states that after her felt dizziness. Patient's CT abdomen showed sigmoid colitis however patient not complaining of N/V/D but came in with a temperature of 103. Patient denies dizziness at this point, CP or SOB     Of note, Patient lives with wife, and independent at baseline. Patient not covid or influenza vaccinated    In the ED,   Vitals: T: 103.1, BP: 111/56, RR: 16, O2: 97%   Labs: WBC: 11.48, H/H 11.7/34.2, BUN/Cr 34/2.2 , eGFR 25, proBNP 1008   Imaging:   Chest XRay: Lungs appear clear with calcification of aortic notch (wet read)  CT Head: No acute intracranial hemorrhage, territorial infarct, mass effect or calvarial fracture   CT Abdomen: 1. No nephrolithiasis, hydronephrosis or obstructive uropathy.  2. Mid to distal sigmoid colitis which is infectious, inflammatory or ischemic in etiology.  US Kidney: Pending   EKG: NSR, RBBB, Inferior infarct, age undetermined (no prior EKG to compare to )   Interventions: NS Bolus x1, Rocephin x1      89 yo M w/ PMHx/ Parkinson's Disease (on no medications), CAD (s/p stent), Bladder cancer (s/p tumor resection) presented to the ED after a fall. Patient was sitting on a rolling chair and fell over. Patient does not recall how he fell and if he had prodromal symptoms. However, wife at bedside states that she went to go see him and found him on the floor which is when she called 911 and was brought the the ED. Patient does not recall if he hit his head, LOC but states that after her felt dizziness. Patient's CT abdomen showed sigmoid colitis however patient not complaining of N/V/D but came in with a temperature of 103. Patient denies dizziness at this point, CP or SOB     Of note, Patient lives with wife, and independent at baseline. Patient not covid or influenza vaccinated    In the ED,   Vitals: T: 103.1, BP: 111/56, RR: 16, O2: 97%   Labs: WBC: 11.48, H/H 11.7/34.2, BUN/Cr 34/2.2 , eGFR 25, proBNP 1008. UA trace ketones, 100 protein, moderate blood, moderate bacteria, 3-5 granular casts.   Imaging:   Chest XRay: Lungs appear clear with calcification of aortic notch (wet read)  CT Head: No acute intracranial hemorrhage, territorial infarct, mass effect or calvarial fracture   CT Abdomen: 1. No nephrolithiasis, hydronephrosis or obstructive uropathy.  2. Mid to distal sigmoid colitis which is infectious, inflammatory or ischemic in etiology.  US Kidney: Pending   EKG: NSR, RBBB, Inferior infarct, age undetermined (no prior EKG to compare to )   Interventions: NS Bolus x1, Rocephin x1      89 yo M w/ PMHx/ Parkinson's Disease (on no medications), CAD (s/p stent), Bladder cancer (s/p tumor resection) presented to the ED after a fall. Patient was sitting on a rolling chair and fell over. Patient does not recall how he fell and if he had prodromal symptoms. However, wife at bedside states that she went to go see him and found him on the floor which is when she called 911 and was brought the the ED. Patient does not recall if he hit his head, or LOC but states that after her felt dizziness. Patient's CT abdomen showed sigmoid colitis however patient not complaining of N/V/D but came in with a temperature of 103. Patient denies dizziness at this point, CP or SOB     Of note, Patient lives with wife, and independent at baseline. Patient not covid or influenza vaccinated    In the ED,   Vitals: T: 103.1, BP: 111/56, RR: 16, O2: 97%   Labs: WBC: 11.48, H/H 11.7/34.2, BUN/Cr 34/2.2 , eGFR 25, proBNP 1008. UA trace ketones, 100 protein, moderate blood, moderate bacteria, 3-5 granular casts.   Imaging:   Chest XRay: Lungs appear clear with calcification of aortic notch (wet read)  CT Head: No acute intracranial hemorrhage, territorial infarct, mass effect or calvarial fracture   CT Abdomen: 1. No nephrolithiasis, hydronephrosis or obstructive uropathy.  2. Mid to distal sigmoid colitis which is infectious, inflammatory or ischemic in etiology.  US Kidney: Pending   EKG: NSR, RBBB, Inferior infarct, age undetermined (no prior EKG to compare to )   Interventions: NS Bolus x1, Rocephin x1      89 yo M w/ PMHx/ Parkinson's Disease (on no medications), CAD (s/p stent), Bladder cancer (s/p tumor resection) presented to the ED after a fall. Patient was sitting on a rolling chair and fell over. Patient states that he remembers the fall - no dizziness or prodromal symptoms. States that he lost his balance and fell backwards. However, wife at bedside states that she went to go see him and found him on the floor which is when she called 911 and was brought the the ED. Patient does not recall if he hit his head, or LOC but states that after he felt dizziness. Patient's CT abdomen showed sigmoid colitis however patient not complaining of N/V/D but came in with a temperature of 103. Patient denies dizziness at this point, CP or SOB     Of note, Patient lives with wife, and independent at baseline. Patient not covid or influenza vaccinated    In the ED,   Vitals: T: 103.1, BP: 111/56, RR: 16, O2: 97%   Labs: WBC: 11.48, H/H 11.7/34.2, BUN/Cr 34/2.2, eGFR 25, proBNP 1008.   UA trace ketones, 100 protein, moderate blood, moderate bacteria, 3-5 granular casts.   Imaging:   Chest XRay: Lungs appear clear with calcification of aortic notch (wet read)  CT Head: No acute intracranial hemorrhage, territorial infarct, mass effect or calvarial fracture   CT Abdomen: 1. No nephrolithiasis, hydronephrosis or obstructive uropathy.  2. Mid to distal sigmoid colitis which is infectious, inflammatory or ischemic in etiology.  US Kidney: Pending   EKG: NSR, RBBB, Inferior infarct, age undetermined (no prior EKG to compare to )   Interventions: NS Bolus x1, Rocephin x1 89 yo M w/ PMHx/ Parkinson's Disease (on no medications), CAD (s/p stent), Bladder cancer (s/p tumor resection) presented to the ED after a fall. Patient was sitting on a rolling chair and fell over. Patient states that he remembers the fall - no dizziness or prodromal symptoms. States that he lost his balance and fell backwards. However, wife at bedside states that she went to go see him and found him on the floor which is when she called 911 and was brought the the ED. Patient does not recall if he hit his head, or LOC but states that after he felt dizziness. Patient's CT abdomen showed sigmoid colitis however patient not complaining of N/V/D but came in with a temperature of 103. Patient denies dizziness at this point, CP or SOB     Of note, Patient lives with wife, and independent at baseline. Patient is not vaccinated for COVID-19 or influenza.    In the ED,   Vitals: T: 103.1, BP: 111/56, RR: 16, O2: 97%   Labs: WBC: 11.48, H/H 11.7/34.2, BUN/Cr 34/2.2, eGFR 25, proBNP 1008.   UA trace ketones, 100 protein, moderate blood, moderate bacteria, 3-5 granular casts.   Imaging:   Chest XRay: Lungs appear clear with calcification of aortic notch (wet read)  CT Head: No acute intracranial hemorrhage, territorial infarct, mass effect or calvarial fracture   CT Abdomen: 1. No nephrolithiasis, hydronephrosis or obstructive uropathy.  2. Mid to distal sigmoid colitis which is infectious, inflammatory or ischemic in etiology.  US Kidney: Pending   EKG: NSR, RBBB, Inferior infarct, age undetermined (no prior EKG to compare to )   Interventions: NS Bolus x1, Rocephin x1

## 2021-11-25 NOTE — ED ADULT NURSE REASSESSMENT NOTE - NS ED NURSE REASSESS COMMENT FT1
0010 Antibiotic therapy in progress Rocephin 1G IVPB .  0030 No reaction to antibiotic, no hives, no itching.

## 2021-11-25 NOTE — CONSULT NOTE ADULT - ASSESSMENT
SMITH on Likely CKD  HTN  Anemia    -Unknown baseline creatinine  -Urine lytes reviewed  -UA small ketone, 100 protein  -UPC 1.4  -Cont IVF  -Creatinine improving  -Hold ACE/ARB/diurtic for now  -Kphos repletion  -Check iron studies

## 2021-11-25 NOTE — H&P ADULT - ATTENDING COMMENTS
89 yo M w/ PMHx/ Parkinson's Disease (on no medications), CAD (s/p stent), Bladder cancer (s/p tumor resection) presented to the ED after a fall. Admitted for workup of fall and treatment of sigmoid colitis and SMITH. Admit to hospitalist service. Monitor for arrhythmia on telemetry. Check 2D ECHO to evaluate cardiac murmur. Neuro consult given possible findings of NPH on CT imaging with balance disturbance and fall. Rule out syncopal event - patient is describing his fall as a loss of balance but is generally a poor historian. Discussed GOC with patient and recommended DNR for patient given his age and poor post-resuscitation prognosis. Patient would like to make a family decision - full code for now. PT evaluation. Discussed with patient at length.    Agree with H&P as outlined above, edited where appropriate. 89 yo M w/ PMHx of Parkinson's Disease (on no medications), CAD (s/p stent), Bladder cancer (s/p tumor resection) presented to the ED after a fall. Admitted for workup of fall and treatment of sigmoid colitis and SMITH. Admit to hospitalist service. Monitor for arrhythmia on telemetry. Check 2D ECHO to evaluate cardiac murmur. Neuro consult given possible findings of NPH on CT imaging with balance disturbance and fall. Rule out syncopal event - patient is describing his fall as a loss of balance but is generally a poor historian. Discussed GOC with patient and recommended DNR for patient given his age and poor post-resuscitation prognosis. Patient would like to make a family decision - full code for now. PT evaluation. Patient with elevated sCr on admission - SMITH vs CKD - has not seen doctor in years but no known kidney disease. Nephrology consulted, recommendations appreciated. Discussed with patient at length.    Agree with H&P as outlined above, edited where appropriate.

## 2021-11-25 NOTE — CHART NOTE - NSCHARTNOTEFT_GEN_A_CORE
Hospitalist Attending Chart Update / Progress Note    Please see H&P written early today by Dr. Holt, for more information.     Pt seen, interviewed, and examined by me.     89 yo M w/ PMHx of Parkinson's Disease (on no medications), CAD (s/p stent), Bladder cancer (s/p tumor resection) presented to the ED after a fall admitted for work-up of fall and treatment for sigmoid colitis and SMITH.   - a/w fall vs syncope, as well as, suspected colitis. Both pt and his wife are poor historians and give unreliable history   - high fever to 103F in the ER, now has normalized  - mild leukocytosis to 11.48 resolving.   - Lactate WNL 1.2.   - CT scan showed Mid to distal sigmoid colitis which is infectious, inflammatory or ischemic in etiology.  - C/W Rocephin & Flagyl for now  - Meeting sepsis criteria on admission (fever, HR >90, colitis)  - C/W Lactated Ringers @ 75mL/hr for now given possible SMITH  - F/U BCx x 2  - Tylenol PRN for fever   - Infectious Disease consulted (Dr. Anderson); recs appreciated  - f/up TTE  - monitor on tele  - SMITH improving with hydration likely in large part prerenal  - nephro ([facility]), recs appreciated              Time spent: 40min Hospitalist Attending Chart Update / Progress Note    Please see H&P written early today by Dr. Holt, for more information.     Pt seen, interviewed, and examined by me.     89 yo M w/ PMHx of Parkinson's Disease (on no medications), CAD (s/p stent), Bladder cancer (s/p tumor resection) presented to the ED after a fall admitted for work-up of fall and treatment for sigmoid colitis and SMITH.   - a/w fall vs syncope, as well as, suspected colitis. Both pt and his wife are poor historians and give unreliable history   - CT Head unremarkable   - neuro (Klaudia), recs appreciated  - fall might have been related to dehydration in setting of infection / high fever.  - high fever to 103F in the ER, now has normalized  - mild leukocytosis to 11.48 resolving.   - Lactate WNL 1.2.   - CT scan showed Mid to distal sigmoid colitis which is infectious, inflammatory or ischemic in etiology.  - C/W Rocephin & Flagyl for now  - Meeting sepsis criteria on admission (fever, HR >90, colitis)  - changed diet to low fiber  - pt with no abd pain and minimal LLQ tenderness to deep palpation  - C/W Lactated Ringers @ 75mL/hr for now given possible SMITH  - F/U BCx x 2  - Tylenol PRN for fever   - Infectious Disease consulted (Dr. Anderson); recs appreciated  - f/up TTE  - monitor on tele  - SMITH improving with hydration likely in large part prerenal  - nephro ([facility]), recs appreciated              Time spent: 40min

## 2021-11-25 NOTE — H&P ADULT - PROBLEM SELECTOR PLAN 4
Patient asymptomatic. UA not impressive. UA trace ketones, 100 protein, moderate blood, moderate bacteria, 3-5 granular casts.   - F/U UC  - Tylenol PRN for fever   - Infectious Disease consulted (Dr. Anderson); F/U Recommendations Patient asymptomatic. UA not impressive. UA trace ketones, 100 protein, moderate blood, moderate bacteria, 3-5 granular casts.   - F/U UCx  - Tylenol PRN for fever   - Infectious Disease consulted (Dr. Anderson); F/U Recommendations

## 2021-11-25 NOTE — H&P ADULT - NSHPLABSRESULTS_GEN_ALL_CORE
< from: CT Renal Stone Hunt (11.24.21 @ 23:16) >    FINDINGS:  LOWER CHEST: Bibasilar dependent atelectasis. Moderate coronary artery calcifications.    LIVER: Hepatic cyst.  BILE DUCTS: Normal caliber.  GALLBLADDER: Within normal limits.  SPLEEN: Within normal limits.  PANCREAS: Withinnormal limits.  ADRENALS: Within normal limits.  KIDNEYS/URETERS: Bilateral renal cysts. Thick septal and wall calcification associated with a large left renal cyst. No nephrolithiasis, hydronephrosis or obstructive uropathy.    BLADDER: Within normal limits.  REPRODUCTIVE ORGANS: Enlarged prostate to 5.8 cm.    BOWEL: Circumferential wall thickening and adjacent inflammatory change involving the mid to distal sigmoid colon compatible with a colitis. No bowel obstruction. Mild-to-moderate amount of stool in the ascending colon. Appendix is normal.  PERITONEUM: No ascites.  VESSELS: Within normal limits.  RETROPERITONEUM/LYMPH NODES: No lymphadenopathy.  ABDOMINAL WALL: Small fat-containing left inguinal hernia.  BONES: Degenerative changes ofthe spine. No fracture or dislocation.    IMPRESSION:  1. No nephrolithiasis, hydronephrosis or obstructive uropathy.  2. Mid to distal sigmoid colitis which is infectious, inflammatory or ischemic in etiology.    < end of copied text >    < from: CT Head No Cont (11.24.21 @ 23:16) >    FINDINGS:  The lateral ventricles are again seen to be dilated out of proportion to the sulcal prominence which could be due to central atrophy versus normal pressure hydrocephalus. Mild chronic microvascular ischemic changes are identified. An old lacunar infarct is seen in the left basal ganglia. No acute territorial infarct is demonstrated.    There is no evidence of an acute hemorrhage or mass-effect in the posterior fossa or in the supratentorial region.    Evaluation of the osseous structures with the appropriate window appears unremarkable. Vascular calcifications are noted. Sequela of bilateral lens surgery is seen. The visualized paranasal sinuses and mastoid air cells are clear.    IMPRESSION:  No acute intracranial hemorrhage, territorial infarct, mass effect or calvarial fracture.    < end of copied text >    Labs, Imaging and EKG all personally reviewed by the attending physician.

## 2021-11-25 NOTE — H&P ADULT - PROBLEM SELECTOR PLAN 3
Patient has no known history of kidney disease. Unknown creatinine/BUN baseline. On admission it was 2.20/34.   - F/U Urine Indices   - C/W Lactate Ringers @ 75mL/hr   - F/U US of Kidneys   - Monitor CMP daily   - Nephrology Consulted (Dr. Freeman); F/U Recommendations SMITH vs CKD  Patient has no known history of kidney disease. Unknown creatinine/BUN baseline. On admission it was 2.20/34.   - F/U Urine Indices   - C/W Lactated Ringers @ 75mL/hr   - F/U US of Kidneys   - Monitor CMP daily   - Nephrology Consulted (Dr. Freeman); F/U Recommendations

## 2021-11-25 NOTE — H&P ADULT - ASSESSMENT
89 yo M w/ PMHx/ Parkinson's Disease (on no medications), CAD (s/p stent), Bladder cancer (s/p tumor resection) presented to the ED after a fall. Admitted for syncope workup, sigmoid colitis on found CT abdomen and kidney injury.    89 yo M w/ PMHx/ Parkinson's Disease (on no medications), CAD (s/p stent), Bladder cancer (s/p tumor resection) presented to the ED after a fall. Admitted for workup of fall and treatment of sigmoid colitis and SMITH. Rule out syncopal event. 89 yo M w/ PMHx of Parkinson's Disease (on no medications), CAD (s/p stent), Bladder cancer (s/p tumor resection) presented to the ED after a fall. Admitted for workup of fall and treatment of sigmoid colitis and SMITH. Rule out syncopal event.

## 2021-11-26 DIAGNOSIS — I49.9 CARDIAC ARRHYTHMIA, UNSPECIFIED: ICD-10-CM

## 2021-11-26 LAB
ANION GAP SERPL CALC-SCNC: 8 MMOL/L — SIGNIFICANT CHANGE UP (ref 5–17)
BUN SERPL-MCNC: 25 MG/DL — HIGH (ref 7–23)
CALCIUM SERPL-MCNC: 9.2 MG/DL — SIGNIFICANT CHANGE UP (ref 8.5–10.1)
CHLORIDE SERPL-SCNC: 109 MMOL/L — HIGH (ref 96–108)
CO2 SERPL-SCNC: 24 MMOL/L — SIGNIFICANT CHANGE UP (ref 22–31)
COVID-19 NUCLEOCAPSID GAM AB INTERP: NEGATIVE — SIGNIFICANT CHANGE UP
COVID-19 NUCLEOCAPSID TOTAL GAM ANTIBODY RESULT: 0.07 INDEX — SIGNIFICANT CHANGE UP
COVID-19 SPIKE DOMAIN AB INTERP: NEGATIVE — SIGNIFICANT CHANGE UP
COVID-19 SPIKE DOMAIN ANTIBODY RESULT: 0.4 U/ML — SIGNIFICANT CHANGE UP
CREAT SERPL-MCNC: 1.9 MG/DL — HIGH (ref 0.5–1.3)
CULTURE RESULTS: SIGNIFICANT CHANGE UP
D DIMER BLD IA.RAPID-MCNC: 764 NG/ML DDU — HIGH
FERRITIN SERPL-MCNC: 334 NG/ML — SIGNIFICANT CHANGE UP (ref 30–400)
FOLATE SERPL-MCNC: 8.2 NG/ML — SIGNIFICANT CHANGE UP
GLUCOSE SERPL-MCNC: 89 MG/DL — SIGNIFICANT CHANGE UP (ref 70–99)
HCT VFR BLD CALC: 32.1 % — LOW (ref 39–50)
HGB BLD-MCNC: 10.6 G/DL — LOW (ref 13–17)
IRON SATN MFR SERPL: 15 UG/DL — LOW (ref 45–165)
IRON SATN MFR SERPL: 9 % — LOW (ref 16–55)
MAGNESIUM SERPL-MCNC: 2.3 MG/DL — SIGNIFICANT CHANGE UP (ref 1.6–2.6)
MCHC RBC-ENTMCNC: 31.5 PG — SIGNIFICANT CHANGE UP (ref 27–34)
MCHC RBC-ENTMCNC: 33 GM/DL — SIGNIFICANT CHANGE UP (ref 32–36)
MCV RBC AUTO: 95.5 FL — SIGNIFICANT CHANGE UP (ref 80–100)
NRBC # BLD: 0 /100 WBCS — SIGNIFICANT CHANGE UP (ref 0–0)
PHOSPHATE SERPL-MCNC: 2.2 MG/DL — LOW (ref 2.5–4.5)
PLATELET # BLD AUTO: 216 K/UL — SIGNIFICANT CHANGE UP (ref 150–400)
POTASSIUM SERPL-MCNC: 3.5 MMOL/L — SIGNIFICANT CHANGE UP (ref 3.5–5.3)
POTASSIUM SERPL-SCNC: 3.5 MMOL/L — SIGNIFICANT CHANGE UP (ref 3.5–5.3)
RBC # BLD: 3.36 M/UL — LOW (ref 4.2–5.8)
RBC # FLD: 19.5 % — HIGH (ref 10.3–14.5)
SARS-COV-2 IGG+IGM SERPL QL IA: 0.07 INDEX — SIGNIFICANT CHANGE UP
SARS-COV-2 IGG+IGM SERPL QL IA: 0.4 U/ML — SIGNIFICANT CHANGE UP
SARS-COV-2 IGG+IGM SERPL QL IA: NEGATIVE — SIGNIFICANT CHANGE UP
SARS-COV-2 IGG+IGM SERPL QL IA: NEGATIVE — SIGNIFICANT CHANGE UP
SODIUM SERPL-SCNC: 141 MMOL/L — SIGNIFICANT CHANGE UP (ref 135–145)
SPECIMEN SOURCE: SIGNIFICANT CHANGE UP
TIBC SERPL-MCNC: 159 UG/DL — LOW (ref 220–430)
TSH SERPL-MCNC: 0.83 UIU/ML — SIGNIFICANT CHANGE UP (ref 0.36–3.74)
UIBC SERPL-MCNC: 144 UG/DL — SIGNIFICANT CHANGE UP (ref 110–370)
VIT B12 SERPL-MCNC: 561 PG/ML — SIGNIFICANT CHANGE UP (ref 232–1245)
WBC # BLD: 10.93 K/UL — HIGH (ref 3.8–10.5)
WBC # FLD AUTO: 10.93 K/UL — HIGH (ref 3.8–10.5)

## 2021-11-26 PROCEDURE — 71045 X-RAY EXAM CHEST 1 VIEW: CPT | Mod: 26

## 2021-11-26 PROCEDURE — 99232 SBSQ HOSP IP/OBS MODERATE 35: CPT

## 2021-11-26 PROCEDURE — 93970 EXTREMITY STUDY: CPT | Mod: 26

## 2021-11-26 PROCEDURE — 78582 LUNG VENTILAT&PERFUS IMAGING: CPT | Mod: 26

## 2021-11-26 PROCEDURE — 99222 1ST HOSP IP/OBS MODERATE 55: CPT

## 2021-11-26 PROCEDURE — 93010 ELECTROCARDIOGRAM REPORT: CPT

## 2021-11-26 PROCEDURE — 99233 SBSQ HOSP IP/OBS HIGH 50: CPT | Mod: GC

## 2021-11-26 RX ORDER — ASPIRIN/CALCIUM CARB/MAGNESIUM 324 MG
81 TABLET ORAL DAILY
Refills: 0 | Status: DISCONTINUED | OUTPATIENT
Start: 2021-11-26 | End: 2021-11-30

## 2021-11-26 RX ORDER — HEPARIN SODIUM 5000 [USP'U]/ML
5000 INJECTION INTRAVENOUS; SUBCUTANEOUS EVERY 8 HOURS
Refills: 0 | Status: DISCONTINUED | OUTPATIENT
Start: 2021-11-26 | End: 2021-11-30

## 2021-11-26 RX ADMIN — HEPARIN SODIUM 5000 UNIT(S): 5000 INJECTION INTRAVENOUS; SUBCUTANEOUS at 06:03

## 2021-11-26 RX ADMIN — Medication 250 MILLIGRAM(S): at 17:43

## 2021-11-26 RX ADMIN — HEPARIN SODIUM 5000 UNIT(S): 5000 INJECTION INTRAVENOUS; SUBCUTANEOUS at 21:04

## 2021-11-26 RX ADMIN — Medication 100 MILLIGRAM(S): at 14:22

## 2021-11-26 RX ADMIN — Medication 100 MILLIGRAM(S): at 21:04

## 2021-11-26 RX ADMIN — Medication 250 MILLIGRAM(S): at 06:03

## 2021-11-26 RX ADMIN — HEPARIN SODIUM 5000 UNIT(S): 5000 INJECTION INTRAVENOUS; SUBCUTANEOUS at 14:23

## 2021-11-26 RX ADMIN — Medication 100 MILLIGRAM(S): at 06:03

## 2021-11-26 RX ADMIN — Medication 1 PACKET(S): at 17:42

## 2021-11-26 RX ADMIN — CEFTRIAXONE 100 MILLIGRAM(S): 500 INJECTION, POWDER, FOR SOLUTION INTRAMUSCULAR; INTRAVENOUS at 23:18

## 2021-11-26 NOTE — PROGRESS NOTE ADULT - ASSESSMENT
SMITH on Likely CKD  HTN  Anemia  Colitis  Syncope  Renal cyst    -Unknown baseline creatinine  -Urine lytes reviewed  -UA small ketone, 100 protein  -UPC 1.4  -Renal indices were improving now stabilized; baseline? will continue to monitor trend  -Held ACE/ARB/diuretic  -Check iron studies  -Renal sono no hydro; complex cyst noted; consider Urology evaluation  -Can avoid additional IVF  -BP stable  -Can restart Diuretics as needed given mild hypoxia    Thank you

## 2021-11-26 NOTE — CONSULT NOTE ADULT - ASSESSMENT
**Consult in progress***   89 yo M w/ PMHx of Parkinson's Disease (on no medications), CAD (s/p stent), Bladder cancer (s/p tumor resection) presented to the ED after a fall admitted for work-up of fall and treatment for sigmoid colitis and SMITH. Cardiology consulted for afib.    89 yo M w/ PMHx of Parkinson's Disease (on no medications), CAD (s/p stent), Bladder cancer (s/p tumor resection) presented to the ED after a fall admitted for work-up of fall and treatment for sigmoid colitis and SMITH. Cardiology consulted for arrhythmia.     Arrhythmia   - Tele strip reviewed, p waves noted in all strips  - Pt without any current cardiac symptoms   - EKG on admission with NSR, RBBB  - Repeat EKG today with NSR with RBBB  - Pt does not have evidence of afib from tele strip or EKG  - Continue to monitor on tele  - Monitor and replete lytes, keep K>4, Mg>2.   standpoint to proceed with planned procedure with routine hemodynamic monitoring.  - Other cardiovascular workup will depend on clinical course.  - All other workup per primary team.  - Will continue to follow.             89 yo M w/ PMHx of Parkinson's Disease (on no medications), CAD (s/p stent), Bladder cancer (s/p tumor resection) presented to the ED after a fall admitted for work-up of fall and treatment for sigmoid colitis and SMITH. Cardiology consulted for arrhythmia.     Arrhythmia   - EKG on admission with NSR, RBBB  - Repeat EKG today with NSR with RBBB  - Tele strip reviewed, p waves noted in all strips likely SR with SA  - Pt does not have evidence of afib from tele strip or EKG  - Continue to monitor on tele    - D-Ddimer elevated with neg dopplers  - awaiting v/q  - check echo    - No signs of significant ischemia or volume overload.     - cont rx for colitis  - Monitor and replete lytes, keep K>4, Mg>2.    - Other cardiovascular workup will depend on clinical course.  - All other workup per primary team.  - Will continue to follow.

## 2021-11-26 NOTE — CONSULT NOTE ADULT - ATTENDING COMMENTS
No signs of significant ischemia or volume overload. EKG with RBBB. Tele without Af. likely SR with SA. check echo. fu primary for w/u for PE. await vq  Further cardiac workup will depend on clinical course.

## 2021-11-26 NOTE — PROGRESS NOTE ADULT - SUBJECTIVE AND OBJECTIVE BOX
Patient is a 89y old  Male who presents with a chief complaint of Sigmoid Colitis (2021 10:57)       HPI:  89 yo M w/ PMHx/ Parkinson's Disease (on no medications), CAD (s/p stent), Bladder cancer (s/p tumor resection) presented to the ED after a fall. Patient was sitting on a rolling chair and fell over. Patient states that he remembers the fall - no dizziness or prodromal symptoms. States that he lost his balance and fell backwards. However, wife at bedside states that she went to go see him and found him on the floor which is when she called 911 and was brought the the ED. Patient does not recall if he hit his head, or LOC but states that after he felt dizziness. Patient's CT abdomen showed sigmoid colitis however patient not complaining of N/V/D but came in with a temperature of 103. Patient denies dizziness at this point, CP or SOB   Of note, Patient lives with wife, and independent at baseline. Patient is not vaccinated for COVID-19 or influenza.  Patient is poor historian.  Denies SOB/N/V.  States he is urinating. Unsure if he has seen nephrologist in the past.    No new complaints; fatigued    PAST MEDICAL & SURGICAL HISTORY:  Shoulder fracture    Bladder cancer    Stented coronary artery  over 15 years ago as per patient    Parkinsons    S/P cardiac catheterization         FAMILY HISTORY:  FH: HTN (hypertension)    NC    Social History:Non smoker    MEDICATIONS  (STANDING):  cefTRIAXone   IVPB 1000 milliGRAM(s) IV Intermittent every 24 hours  heparin   Injectable 5000 Unit(s) SubCutaneous every 12 hours  lactated ringers. 1000 milliLiter(s) (75 mL/Hr) IV Continuous <Continuous>  metroNIDAZOLE  IVPB 500 milliGRAM(s) IV Intermittent every 8 hours  potassium phosphate / sodium phosphate Powder (PHOS-NaK) 1 Packet(s) Oral every 6 hours  saccharomyces boulardii 250 milliGRAM(s) Oral two times a day    MEDICATIONS  (PRN):  acetaminophen     Tablet .. 650 milliGRAM(s) Oral every 6 hours PRN Temp greater or equal to 38C (100.4F), Mild Pain (1 - 3)  melatonin 3 milliGRAM(s) Oral at bedtime PRN Insomnia   Meds reviewed    Allergies    clindamycin (Unknown)  Levaquin (Unknown)    Intolerances         REVIEW OF SYSTEMS:    Constitutional: +fatigue  HEENT: Denies headaches and dizziness  Respiratory: denies SOB, cough, or wheezing  Cardiovascular: denies CP, palpitations  Gastrointestinal: Denies nausea, denies vomiting, diarrhea, constipation, abdominal pain, or bloody stools  Genitourinary: denies painful urination, increased frequency, urgency, or bloody urine  Skin: denies rashes or itching  Musculoskeletal: denies muscle aches, joint swelling  Neurologic: Denies generalized weakness, denies loss of sensation, numbness, or tingling      Vital Signs Last 24 Hrs  T(C): 37.1 (2021 05:10), Max: 37.5 (2021 20:14)  T(F): 98.7 (2021 05:10), Max: 99.5 (2021 20:14)  HR: 81 (2021 05:10) (81 - 88)  BP: 125/60 (2021 05:10) (107/55 - 128/62)  BP(mean): --  RR: 18 (2021 05:10) (18 - 18)  SpO2: 93% (2021 05:10) (92% - 96%)    PHYSICAL EXAM:    GENERAL: NAD  HEAD:  Atraumatic, Normocephalic  EYES: EOMI, conjunctiva and sclera clear, [place]  ENMT: No Drainage from nares, No drainage from ears  NECK: Supple, neck  veins full  NERVOUS SYSTEM:  Awake and Alert  CHEST/LUNG: Clear to percussion bilaterally; No rales, rhonchi, wheezing, or rubs  HEART: Regular rate and rhythm; No murmurs, rubs, or gallops  ABDOMEN: Soft, Nontender, Nondistended; Bowel sounds present  EXTREMITIES:  No Edema  SKIN: No rashes No obvious ecchymosis      LABS:                                           10.6   10.93 )-----------( 216      ( 2021 07:46 )             32.1         141  |  109<H>  |  25<H>  ----------------------------<  89  3.5   |  24  |  1.90<H>    Ca    9.2      2021 07:46  Phos  2.2       Mg     2.3         TPro  7.0  /  Alb  3.2<L>  /  TBili  0.4  /  DBili  x   /  AST  27  /  ALT  18  /  AlkPhos  43      PT/INR - ( 2021 22:27 )   PT: 13.3 sec;   INR: 1.14 ratio         PTT - ( 2021 22:27 )  PTT:28.8 sec  Urinalysis Basic - ( 2021 03:24 )    Color: Yellow / Appearance: Slightly Turbid / S.020 / pH: x  Gluc: x / Ketone: Small  / Bili: Negative / Urobili: Negative   Blood: x / Protein: 100 / Nitrite: Negative   Leuk Esterase: Negative / RBC: 0-2 /HPF / WBC 0-2   Sq Epi: x / Non Sq Epi: Few / Bacteria: Moderate

## 2021-11-26 NOTE — PROGRESS NOTE ADULT - PROBLEM SELECTOR PLAN 3
SMITH vs CKD  Patient has no known history of kidney disease. Unknown creatinine/BUN baseline. On admission it was 2.20/34.   - F/U Urine Indices   - hold Lactated Ringers @ 75mL/hr per nephrology  - US of kidney: technically limited, R renal pelviectasis, large b/l renal cysts w/ complex appearing cyst R kidney  - Monitor CMP daily   - Nephrology Consulted (Dr. Freeman); recs appreciated Overnight on tele, patient was alternating between afib and NSR @80's-90's remaing HDS.  - EKG STAT -> NSR w/ large t-wave inversions in V4-V6 and I. suspicious for S1Q3T3  - d-dimer elevated: 764  - f/up TTE  - f/u US LE dopplers to r/o dvt -> unoffical read (-)  - f/u VQ scan urgent to r/o PE  - c/w tele monitoring  - Cardiology consulted, Dr. Gomez, f/u recs - Overnight on tele, the tele tech read strips as patient having possible alternating afib and NSR @80's-90's.  - cardio reviewed strips and there was no afib detected, just sinus arrhythmia  - EKG STAT -> NSR w/ t-wave inversions in V4-V6, RBBB, S1Q3T3 pattern  - d-dimer elevated: 764  - f/up TTE  - f/u US LE dopplers to r/o DVT -> negative for DVT  - f/u VQ scan urgent to r/o PE -- very low probability for PE  - c/w tele monitoring  - Cardiology consulted, Dr. Gomez, recs appreciated

## 2021-11-26 NOTE — DIETITIAN INITIAL EVALUATION ADULT. - ORAL INTAKE PTA/DIET HISTORY
patient sleeping unable to obtain history at this time  per CNA patient sleeping this AM then tray set up and patient seen feeding himself

## 2021-11-26 NOTE — CONSULT NOTE ADULT - SUBJECTIVE AND OBJECTIVE BOX
***Charting in progress***  [facility]         Britany Stark, Diana, Raoul, Jason Puckett Savella        [phone] (office)    Reason for Consult: Afib    Interval HPI: Patient seen and examined at bedside.      HPI:  89 yo M w/ PMHx/ Parkinson's Disease (on no medications), CAD (s/p stent), Bladder cancer (s/p tumor resection) presented to the ED after a fall. Patient was sitting on a rolling chair and fell over. Patient states that he remembers the fall - no dizziness or prodromal symptoms. States that he lost his balance and fell backwards. However, wife at bedside states that she went to go see him and found him on the floor which is when she called 911 and was brought the the ED. Patient does not recall if he hit his head, or LOC but states that after he felt dizziness. Patient's CT abdomen showed sigmoid colitis however patient not complaining of N/V/D but came in with a temperature of 103. Patient denies dizziness at this point, CP or SOB     Of note, Patient lives with wife, and independent at baseline. Patient is not vaccinated for COVID-19 or influenza.    In the ED,   Vitals: T: 103.1, BP: 111/56, RR: 16, O2: 97%   Labs: WBC: 11.48, H/H 11.7/34.2, BUN/Cr 34/2.2, eGFR 25, proBNP 1008.   UA trace ketones, 100 protein, moderate blood, moderate bacteria, 3-5 granular casts.   Imaging:   Chest XRay: Lungs appear clear with calcification of aortic notch (wet read)  CT Head: No acute intracranial hemorrhage, territorial infarct, mass effect or calvarial fracture   CT Abdomen: 1. No nephrolithiasis, hydronephrosis or obstructive uropathy.  2. Mid to distal sigmoid colitis which is infectious, inflammatory or ischemic in etiology.  US Kidney: Pending   EKG: NSR, RBBB, Inferior infarct, age undetermined (no prior EKG to compare to )   Interventions: NS Bolus x1, Rocephin x1 (25 Nov 2021 02:31)      PAST MEDICAL & SURGICAL HISTORY:  Shoulder fracture    Bladder cancer    Stented coronary artery  over 15 years ago as per patient    Parkinsons    S/P cardiac catheterization        SOCIAL HISTORY: No active tobacco, alcohol or illicit drug use    FAMILY HISTORY:  FH: HTN (hypertension)        Home Medications:      MEDICATIONS  (STANDING):  cefTRIAXone   IVPB 1000 milliGRAM(s) IV Intermittent every 24 hours  heparin   Injectable 5000 Unit(s) SubCutaneous every 8 hours  metroNIDAZOLE  IVPB 500 milliGRAM(s) IV Intermittent every 8 hours  potassium phosphate / sodium phosphate Powder (PHOS-NaK) 1 Packet(s) Oral every 6 hours  saccharomyces boulardii 250 milliGRAM(s) Oral two times a day    MEDICATIONS  (PRN):  acetaminophen     Tablet .. 650 milliGRAM(s) Oral every 6 hours PRN Temp greater or equal to 38C (100.4F), Mild Pain (1 - 3)  melatonin 3 milliGRAM(s) Oral at bedtime PRN Insomnia      Allergies    clindamycin (Unknown)  Levaquin (Unknown)    Intolerances        REVIEW OF SYSTEMS: Negative except as per HPI.    VITAL SIGNS:   Vital Signs Last 24 Hrs  T(C): 36.9 (26 Nov 2021 11:04), Max: 37.5 (25 Nov 2021 20:14)  T(F): 98.5 (26 Nov 2021 11:04), Max: 99.5 (25 Nov 2021 20:14)  HR: 81 (26 Nov 2021 11:04) (81 - 88)  BP: 103/56 (26 Nov 2021 11:04) (103/56 - 128/62)  BP(mean): --  RR: 18 (26 Nov 2021 11:04) (18 - 18)  SpO2: 93% (26 Nov 2021 11:04) (92% - 96%)    I&O's Summary    25 Nov 2021 07:01  -  26 Nov 2021 07:00  --------------------------------------------------------  IN: 700 mL / OUT: 1000 mL / NET: -300 mL    26 Nov 2021 07:01  -  26 Nov 2021 12:12  --------------------------------------------------------  IN: 0 mL / OUT: 501 mL / NET: -501 mL        PHYSICAL EXAM:  Constitutional: NAD, well-developed  HEENT NC/AT, moist mucous membranes  Pulmonary: Non-labored, breath sounds are clear bilaterally, no wheezing, rales or rhonchi  Cardiovascular: +S1, S2, RRR, no murmur  Gastrointestinal: Soft, nontender, nondistended, normoactive bowel sounds  Extremities: No peripheral edema   Neurological: Alert, strength and sensitivity are grossly intact  Skin: No obvious lesions/rashes  Psych: Mood & affect appropriate    LABS: All Labs Reviewed:                        10.6   10.93 )-----------( 216      ( 26 Nov 2021 07:46 )             32.1                         10.6   10.36 )-----------( 191      ( 25 Nov 2021 05:13 )             32.0                         11.7   11.48 )-----------( 211      ( 24 Nov 2021 22:27 )             35.2     26 Nov 2021 07:46    141    |  109    |  25     ----------------------------<  89     3.5     |  24     |  1.90   25 Nov 2021 05:13    138    |  108    |  30     ----------------------------<  100    3.6     |  26     |  1.90   24 Nov 2021 22:27    137    |  107    |  34     ----------------------------<  112    4.3     |  24     |  2.20     Ca    9.2        26 Nov 2021 07:46  Ca    9.1        25 Nov 2021 05:13  Ca    9.9        24 Nov 2021 22:27  Phos  2.2       26 Nov 2021 07:46  Phos  2.1       25 Nov 2021 05:13  Mg     2.3       26 Nov 2021 07:46  Mg     2.3       25 Nov 2021 05:13    TPro  7.0    /  Alb  3.2    /  TBili  0.4    /  DBili  x      /  AST  27     /  ALT  18     /  AlkPhos  43     25 Nov 2021 05:13  TPro  8.3    /  Alb  3.6    /  TBili  0.5    /  DBili  x      /  AST  32     /  ALT  21     /  AlkPhos  50     24 Nov 2021 22:27    PT/INR - ( 24 Nov 2021 22:27 )   PT: 13.3 sec;   INR: 1.14 ratio         PTT - ( 24 Nov 2021 22:27 )  PTT:28.8 sec  CARDIAC MARKERS ( 25 Nov 2021 05:13 )  x     / x     / 224 U/L / x     / x      CARDIAC MARKERS ( 24 Nov 2021 22:27 )  x     / x     / 185 U/L / x     / 1.5 ng/mL      Blood Culture: Organism --  Gram Stain Blood -- Gram Stain --  Specimen Source .Blood Blood-Peripheral  Culture-Blood --      11-24 @ 22:27  Pro Bnp 1008    11-26 @ 07:46  TSH: 0.83      EKG: Normal sinus rhythm with sinus arrhythmia  Right bundle branch block  Inferior infarct (cited on or before 24-NOV-2021)  Abnormal ECG  When compared with ECG of 07-FEB-2014 09:32,  Non-specific change in ST segment in Anterior leads    RADIOLOGY:    CXR:   [facility]         Britany Stark, Diana, Raoul, Italo, Lauren Gomez        [phone] (office)    Reason for Consult: Arrhythmia     Interval HPI: Patient seen and examined at bedside.      HPI:  89 yo M w/ PMHx/ Parkinson's Disease (on no medications), CAD (s/p stent), Bladder cancer (s/p tumor resection) presented to the ED after a fall. Patient was sitting on a rolling chair and fell over. Patient states that he remembers the fall - no dizziness or prodromal symptoms. States that he lost his balance and fell backwards. However, wife at bedside states that she went to go see him and found him on the floor which is when she called 911 and was brought the the ED. Patient does not recall if he hit his head, or LOC but states that after he felt dizziness. Patient's CT abdomen showed sigmoid colitis however patient not complaining of N/V/D but came in with a temperature of 103. Patient denies dizziness at this point, CP or SOB     Of note, Patient lives with wife, and independent at baseline. Patient is not vaccinated for COVID-19 or influenza.    In the ED,   Vitals: T: 103.1, BP: 111/56, RR: 16, O2: 97%   Labs: WBC: 11.48, H/H 11.7/34.2, BUN/Cr 34/2.2, eGFR 25, proBNP 1008.   UA trace ketones, 100 protein, moderate blood, moderate bacteria, 3-5 granular casts.   Imaging:   Chest XRay: Lungs appear clear with calcification of aortic notch (wet read)  CT Head: No acute intracranial hemorrhage, territorial infarct, mass effect or calvarial fracture   CT Abdomen: 1. No nephrolithiasis, hydronephrosis or obstructive uropathy.  2. Mid to distal sigmoid colitis which is infectious, inflammatory or ischemic in etiology.  US Kidney: Pending   EKG: NSR, RBBB, Inferior infarct, age undetermined (no prior EKG to compare to )   Interventions: NS Bolus x1, Rocephin x1 (25 Nov 2021 02:31)      PAST MEDICAL & SURGICAL HISTORY:  Shoulder fracture    Bladder cancer    Stented coronary artery  over 15 years ago as per patient    Parkinsons    S/P cardiac catheterization        SOCIAL HISTORY: No active tobacco, alcohol or illicit drug use    FAMILY HISTORY:  FH: HTN (hypertension)        Home Medications:      MEDICATIONS  (STANDING):  cefTRIAXone   IVPB 1000 milliGRAM(s) IV Intermittent every 24 hours  heparin   Injectable 5000 Unit(s) SubCutaneous every 8 hours  metroNIDAZOLE  IVPB 500 milliGRAM(s) IV Intermittent every 8 hours  potassium phosphate / sodium phosphate Powder (PHOS-NaK) 1 Packet(s) Oral every 6 hours  saccharomyces boulardii 250 milliGRAM(s) Oral two times a day    MEDICATIONS  (PRN):  acetaminophen     Tablet .. 650 milliGRAM(s) Oral every 6 hours PRN Temp greater or equal to 38C (100.4F), Mild Pain (1 - 3)  melatonin 3 milliGRAM(s) Oral at bedtime PRN Insomnia      Allergies    clindamycin (Unknown)  Levaquin (Unknown)    Intolerances        REVIEW OF SYSTEMS: Negative except as per HPI.    VITAL SIGNS:   Vital Signs Last 24 Hrs  T(C): 36.9 (26 Nov 2021 11:04), Max: 37.5 (25 Nov 2021 20:14)  T(F): 98.5 (26 Nov 2021 11:04), Max: 99.5 (25 Nov 2021 20:14)  HR: 81 (26 Nov 2021 11:04) (81 - 88)  BP: 103/56 (26 Nov 2021 11:04) (103/56 - 128/62)  BP(mean): --  RR: 18 (26 Nov 2021 11:04) (18 - 18)  SpO2: 93% (26 Nov 2021 11:04) (92% - 96%)    I&O's Summary    25 Nov 2021 07:01  -  26 Nov 2021 07:00  --------------------------------------------------------  IN: 700 mL / OUT: 1000 mL / NET: -300 mL    26 Nov 2021 07:01  -  26 Nov 2021 12:12  --------------------------------------------------------  IN: 0 mL / OUT: 501 mL / NET: -501 mL        PHYSICAL EXAM:  Constitutional: NAD, well-developed  HEENT NC/AT, moist mucous membranes  Pulmonary: Non-labored, breath sounds are clear bilaterally, no wheezing, rales or rhonchi  Cardiovascular: +S1, S2, RRR, no murmur  Gastrointestinal: Soft, nontender, nondistended, normoactive bowel sounds  Extremities: No peripheral edema   Neurological: Alert, strength and sensitivity are grossly intact  Skin: No obvious lesions/rashes  Psych: Mood & affect appropriate    LABS: All Labs Reviewed:                        10.6   10.93 )-----------( 216      ( 26 Nov 2021 07:46 )             32.1                         10.6   10.36 )-----------( 191      ( 25 Nov 2021 05:13 )             32.0                         11.7   11.48 )-----------( 211      ( 24 Nov 2021 22:27 )             35.2     26 Nov 2021 07:46    141    |  109    |  25     ----------------------------<  89     3.5     |  24     |  1.90   25 Nov 2021 05:13    138    |  108    |  30     ----------------------------<  100    3.6     |  26     |  1.90   24 Nov 2021 22:27    137    |  107    |  34     ----------------------------<  112    4.3     |  24     |  2.20     Ca    9.2        26 Nov 2021 07:46  Ca    9.1        25 Nov 2021 05:13  Ca    9.9        24 Nov 2021 22:27  Phos  2.2       26 Nov 2021 07:46  Phos  2.1       25 Nov 2021 05:13  Mg     2.3       26 Nov 2021 07:46  Mg     2.3       25 Nov 2021 05:13    TPro  7.0    /  Alb  3.2    /  TBili  0.4    /  DBili  x      /  AST  27     /  ALT  18     /  AlkPhos  43     25 Nov 2021 05:13  TPro  8.3    /  Alb  3.6    /  TBili  0.5    /  DBili  x      /  AST  32     /  ALT  21     /  AlkPhos  50     24 Nov 2021 22:27    PT/INR - ( 24 Nov 2021 22:27 )   PT: 13.3 sec;   INR: 1.14 ratio         PTT - ( 24 Nov 2021 22:27 )  PTT:28.8 sec  CARDIAC MARKERS ( 25 Nov 2021 05:13 )  x     / x     / 224 U/L / x     / x      CARDIAC MARKERS ( 24 Nov 2021 22:27 )  x     / x     / 185 U/L / x     / 1.5 ng/mL      Blood Culture: Organism --  Gram Stain Blood -- Gram Stain --  Specimen Source .Blood Blood-Peripheral  Culture-Blood --      11-24 @ 22:27  Pro Bnp 1008    11-26 @ 07:46  TSH: 0.83          RADIOLOGY: b/l LE doppler negative for DVT    CXR: Unremarkable frontal chest x ray    EKG on admission with NSR, RBBB  Repeat EKG today with NSR with RBBB [facility]         Britany Stark, Diana, Raoul, Italo, Lauren Gomez        [phone] (office)    Reason for Consult: Arrhythmia     Interval HPI: Patient seen and examined at bedside.      HPI:  89 yo M w/ PMHx/ Parkinson's Disease (on no medications), CAD (s/p stent), Bladder cancer (s/p tumor resection) presented to the ED after a fall. Patient was sitting on a rolling chair and fell over. Patient states that he remembers the fall - no dizziness or prodromal symptoms. States that he lost his balance and fell backwards. However, wife at bedside states that she went to go see him and found him on the floor which is when she called 911 and was brought the the ED. Patient does not recall if he hit his head, or LOC but states that after he felt dizziness. Patient's CT abdomen showed sigmoid colitis however patient not complaining of N/V/D but came in with a temperature of 103. Patient denies dizziness at this point, CP or SOB     Of note, Patient lives with wife, and independent at baseline. Patient is not vaccinated for COVID-19 or influenza.    In the ED,   Vitals: T: 103.1, BP: 111/56, RR: 16, O2: 97%   Labs: WBC: 11.48, H/H 11.7/34.2, BUN/Cr 34/2.2, eGFR 25, proBNP 1008.   UA trace ketones, 100 protein, moderate blood, moderate bacteria, 3-5 granular casts.   Imaging:   Chest XRay: Lungs appear clear with calcification of aortic notch (wet read)  CT Head: No acute intracranial hemorrhage, territorial infarct, mass effect or calvarial fracture   CT Abdomen: 1. No nephrolithiasis, hydronephrosis or obstructive uropathy.  2. Mid to distal sigmoid colitis which is infectious, inflammatory or ischemic in etiology.  US Kidney: Pending   EKG: NSR, RBBB, Inferior infarct, age undetermined (no prior EKG to compare to )   Interventions: NS Bolus x1, Rocephin x1 (25 Nov 2021 02:31)      PAST MEDICAL & SURGICAL HISTORY:  Shoulder fracture    Bladder cancer    Stented coronary artery  over 15 years ago as per patient    Parkinsons    S/P cardiac catheterization        SOCIAL HISTORY: No active tobacco, alcohol or illicit drug use    FAMILY HISTORY:  FH: HTN (hypertension)        Home Medications:      MEDICATIONS  (STANDING):  cefTRIAXone   IVPB 1000 milliGRAM(s) IV Intermittent every 24 hours  heparin   Injectable 5000 Unit(s) SubCutaneous every 8 hours  metroNIDAZOLE  IVPB 500 milliGRAM(s) IV Intermittent every 8 hours  potassium phosphate / sodium phosphate Powder (PHOS-NaK) 1 Packet(s) Oral every 6 hours  saccharomyces boulardii 250 milliGRAM(s) Oral two times a day    MEDICATIONS  (PRN):  acetaminophen     Tablet .. 650 milliGRAM(s) Oral every 6 hours PRN Temp greater or equal to 38C (100.4F), Mild Pain (1 - 3)  melatonin 3 milliGRAM(s) Oral at bedtime PRN Insomnia      Allergies    clindamycin (Unknown)  Levaquin (Unknown)    Intolerances        REVIEW OF SYSTEMS: Negative except as per HPI.    VITAL SIGNS:   Vital Signs Last 24 Hrs  T(C): 36.9 (26 Nov 2021 11:04), Max: 37.5 (25 Nov 2021 20:14)  T(F): 98.5 (26 Nov 2021 11:04), Max: 99.5 (25 Nov 2021 20:14)  HR: 81 (26 Nov 2021 11:04) (81 - 88)  BP: 103/56 (26 Nov 2021 11:04) (103/56 - 128/62)  BP(mean): --  RR: 18 (26 Nov 2021 11:04) (18 - 18)  SpO2: 93% (26 Nov 2021 11:04) (92% - 96%)    I&O's Summary    25 Nov 2021 07:01  -  26 Nov 2021 07:00  --------------------------------------------------------  IN: 700 mL / OUT: 1000 mL / NET: -300 mL    26 Nov 2021 07:01  -  26 Nov 2021 12:12  --------------------------------------------------------  IN: 0 mL / OUT: 501 mL / NET: -501 mL        PHYSICAL EXAM:  Constitutional: NAD, elderly male  HEENT NC/AT, moist mucous membranes  Pulmonary: Non-labored, breath sounds are clear bilaterally, no wheezing, rales or rhonchi  Cardiovascular: +S1, S2, RRR, 2/6 systolic murmur   Gastrointestinal: Soft, nontender, nondistended, normoactive bowel sounds  Extremities: No peripheral edema   Neurological: Alert, strength and sensitivity are grossly intact  Skin: No obvious lesions/rashes  Psych: Mood & affect appropriate    LABS: All Labs Reviewed:                        10.6   10.93 )-----------( 216      ( 26 Nov 2021 07:46 )             32.1                         10.6   10.36 )-----------( 191      ( 25 Nov 2021 05:13 )             32.0                         11.7   11.48 )-----------( 211      ( 24 Nov 2021 22:27 )             35.2     26 Nov 2021 07:46    141    |  109    |  25     ----------------------------<  89     3.5     |  24     |  1.90   25 Nov 2021 05:13    138    |  108    |  30     ----------------------------<  100    3.6     |  26     |  1.90   24 Nov 2021 22:27    137    |  107    |  34     ----------------------------<  112    4.3     |  24     |  2.20     Ca    9.2        26 Nov 2021 07:46  Ca    9.1        25 Nov 2021 05:13  Ca    9.9        24 Nov 2021 22:27  Phos  2.2       26 Nov 2021 07:46  Phos  2.1       25 Nov 2021 05:13  Mg     2.3       26 Nov 2021 07:46  Mg     2.3       25 Nov 2021 05:13    TPro  7.0    /  Alb  3.2    /  TBili  0.4    /  DBili  x      /  AST  27     /  ALT  18     /  AlkPhos  43     25 Nov 2021 05:13  TPro  8.3    /  Alb  3.6    /  TBili  0.5    /  DBili  x      /  AST  32     /  ALT  21     /  AlkPhos  50     24 Nov 2021 22:27    PT/INR - ( 24 Nov 2021 22:27 )   PT: 13.3 sec;   INR: 1.14 ratio         PTT - ( 24 Nov 2021 22:27 )  PTT:28.8 sec  CARDIAC MARKERS ( 25 Nov 2021 05:13 )  x     / x     / 224 U/L / x     / x      CARDIAC MARKERS ( 24 Nov 2021 22:27 )  x     / x     / 185 U/L / x     / 1.5 ng/mL      Blood Culture: Organism --  Gram Stain Blood -- Gram Stain --  Specimen Source .Blood Blood-Peripheral  Culture-Blood --      11-24 @ 22:27  Pro Bnp 1008    11-26 @ 07:46  TSH: 0.83          RADIOLOGY: b/l LE doppler negative for DVT    CXR: Unremarkable frontal chest x ray    EKG on admission with NSR, RBBB  Repeat EKG today with NSR with RBBB [facility]         Britany Stark, Diana, Raoul, Italo, Lauren Gomez        [phone] (office)    Reason for Consult: Arrhythmia     Interval HPI: Patient seen and examined at bedside.      HPI:  89 yo M w/ PMHx/ Parkinson's Disease (on no medications), CAD (s/p stent), Bladder cancer (s/p tumor resection) presented to the ED after a fall. Patient was sitting on a rolling chair and fell over. Patient states that he remembers the fall - no dizziness or prodromal symptoms. States that he lost his balance and fell backwards. However, wife at bedside states that she went to go see him and found him on the floor which is when she called 911 and was brought the the ED. Patient does not recall if he hit his head, or LOC but states that after he felt dizziness. Patient's CT abdomen showed sigmoid colitis however patient not complaining of N/V/D but came in with a temperature of 103. Patient denies dizziness at this point, CP or SOB     Of note, Patient lives with wife, and independent at baseline. Patient is not vaccinated for COVID-19 or influenza.    In the ED,   Vitals: T: 103.1, BP: 111/56, RR: 16, O2: 97%   Labs: WBC: 11.48, H/H 11.7/34.2, BUN/Cr 34/2.2, eGFR 25, proBNP 1008.   UA trace ketones, 100 protein, moderate blood, moderate bacteria, 3-5 granular casts.   Imaging:   Chest XRay: Lungs appear clear with calcification of aortic notch (wet read)  CT Head: No acute intracranial hemorrhage, territorial infarct, mass effect or calvarial fracture   CT Abdomen: 1. No nephrolithiasis, hydronephrosis or obstructive uropathy.  2. Mid to distal sigmoid colitis which is infectious, inflammatory or ischemic in etiology.  US Kidney: Pending   EKG: NSR, RBBB, Inferior infarct, age undetermined (no prior EKG to compare to )   Interventions: NS Bolus x1, Rocephin x1 (25 Nov 2021 02:31)      PAST MEDICAL & SURGICAL HISTORY:  Shoulder fracture    Bladder cancer    Stented coronary artery  over 15 years ago as per patient    Parkinsons    S/P cardiac catheterization        SOCIAL HISTORY: No active tobacco, alcohol or illicit drug use    FAMILY HISTORY:  FH: HTN (hypertension)  no scd or early cad        Home Medications:      MEDICATIONS  (STANDING):  cefTRIAXone   IVPB 1000 milliGRAM(s) IV Intermittent every 24 hours  heparin   Injectable 5000 Unit(s) SubCutaneous every 8 hours  metroNIDAZOLE  IVPB 500 milliGRAM(s) IV Intermittent every 8 hours  potassium phosphate / sodium phosphate Powder (PHOS-NaK) 1 Packet(s) Oral every 6 hours  saccharomyces boulardii 250 milliGRAM(s) Oral two times a day    MEDICATIONS  (PRN):  acetaminophen     Tablet .. 650 milliGRAM(s) Oral every 6 hours PRN Temp greater or equal to 38C (100.4F), Mild Pain (1 - 3)  melatonin 3 milliGRAM(s) Oral at bedtime PRN Insomnia      Allergies    clindamycin (Unknown)  Levaquin (Unknown)    Intolerances        REVIEW OF SYSTEMS: Negative except as per HPI.    VITAL SIGNS:   Vital Signs Last 24 Hrs  T(C): 36.9 (26 Nov 2021 11:04), Max: 37.5 (25 Nov 2021 20:14)  T(F): 98.5 (26 Nov 2021 11:04), Max: 99.5 (25 Nov 2021 20:14)  HR: 81 (26 Nov 2021 11:04) (81 - 88)  BP: 103/56 (26 Nov 2021 11:04) (103/56 - 128/62)  BP(mean): --  RR: 18 (26 Nov 2021 11:04) (18 - 18)  SpO2: 93% (26 Nov 2021 11:04) (92% - 96%)    I&O's Summary    25 Nov 2021 07:01  -  26 Nov 2021 07:00  --------------------------------------------------------  IN: 700 mL / OUT: 1000 mL / NET: -300 mL    26 Nov 2021 07:01  -  26 Nov 2021 12:12  --------------------------------------------------------  IN: 0 mL / OUT: 501 mL / NET: -501 mL        PHYSICAL EXAM:  Constitutional: NAD, elderly male  HEENT NC/AT, moist mucous membranes  Pulmonary: Non-labored, breath sounds are clear bilaterally, no wheezing, rales or rhonchi  Cardiovascular: +S1, S2, RRR, 2/6 systolic murmur   Gastrointestinal: Soft, nontender, nondistended, normoactive bowel sounds  Extremities: No peripheral edema   Neurological: Alert, strength and sensitivity are grossly intact  Skin: No obvious lesions/rashes  Psych: Mood & affect appropriate    LABS: All Labs Reviewed:                        10.6   10.93 )-----------( 216      ( 26 Nov 2021 07:46 )             32.1                         10.6   10.36 )-----------( 191      ( 25 Nov 2021 05:13 )             32.0                         11.7   11.48 )-----------( 211      ( 24 Nov 2021 22:27 )             35.2     26 Nov 2021 07:46    141    |  109    |  25     ----------------------------<  89     3.5     |  24     |  1.90   25 Nov 2021 05:13    138    |  108    |  30     ----------------------------<  100    3.6     |  26     |  1.90   24 Nov 2021 22:27    137    |  107    |  34     ----------------------------<  112    4.3     |  24     |  2.20     Ca    9.2        26 Nov 2021 07:46  Ca    9.1        25 Nov 2021 05:13  Ca    9.9        24 Nov 2021 22:27  Phos  2.2       26 Nov 2021 07:46  Phos  2.1       25 Nov 2021 05:13  Mg     2.3       26 Nov 2021 07:46  Mg     2.3       25 Nov 2021 05:13    TPro  7.0    /  Alb  3.2    /  TBili  0.4    /  DBili  x      /  AST  27     /  ALT  18     /  AlkPhos  43     25 Nov 2021 05:13  TPro  8.3    /  Alb  3.6    /  TBili  0.5    /  DBili  x      /  AST  32     /  ALT  21     /  AlkPhos  50     24 Nov 2021 22:27    PT/INR - ( 24 Nov 2021 22:27 )   PT: 13.3 sec;   INR: 1.14 ratio         PTT - ( 24 Nov 2021 22:27 )  PTT:28.8 sec  CARDIAC MARKERS ( 25 Nov 2021 05:13 )  x     / x     / 224 U/L / x     / x      CARDIAC MARKERS ( 24 Nov 2021 22:27 )  x     / x     / 185 U/L / x     / 1.5 ng/mL      Blood Culture: Organism --  Gram Stain Blood -- Gram Stain --  Specimen Source .Blood Blood-Peripheral  Culture-Blood --      11-24 @ 22:27  Pro Bnp 1008    11-26 @ 07:46  TSH: 0.83          RADIOLOGY: b/l LE doppler negative for DVT    CXR: Unremarkable frontal chest x ray    EKG on admission with NSR, RBBB  Repeat EKG today with NSR with RBBB

## 2021-11-26 NOTE — PROGRESS NOTE ADULT - SUBJECTIVE AND OBJECTIVE BOX
[facility]  INFECTIOUS DISEASES   [address] [place] [address]  Tel: [phone]     Fax: [phone]  ======================================================  MD Ioana Villanueva Kaushal, MD Cho, Michelle, MD   ======================================================    [id number]  HAYDER MITCH     Follow up; Sigmoid Colitis     Feels very weak and tired this morning.   No more fever. in last 24hrs.   Cultures negative.     PAST MEDICAL & SURGICAL HISTORY:  Shoulder fracture  Bladder cancer  Stented coronary artery  over 15 years ago as per patient  Parkinsons  S/P cardiac catheterization    Social Hx: no smoking, ETOH or drugs     FAMILY HISTORY:  FH: HTN (hypertension)    Allergies  clindamycin (Unknown)  Levaquin (Unknown)    Antibiotics:  cefTRIAXone   IVPB 1000 milliGRAM(s) IV Intermittent every 24 hours  metroNIDAZOLE  IVPB 500 milliGRAM(s) IV Intermittent every 8 hours     REVIEW OF SYSTEMS:  CONSTITUTIONAL:  No Fever or chills, weakness   HEENT:  No diplopia or blurred vision.  No sore throat or runny nose.  CARDIOVASCULAR:  No chest pain or SOB.  RESPIRATORY:  No cough, shortness of breath, PND or orthopnea.  GASTROINTESTINAL:  No nausea, vomiting or diarrhea. + abdominal discomfort  GENITOURINARY:  No dysuria, frequency or urgency. No Blood in urine  MUSCULOSKELETAL:  no joint aches, no muscle pain  SKIN:  No change in skin, hair or nails.  NEUROLOGIC:  No paresthesias, fasciculations, seizures or weakness.  PSYCHIATRIC:  No disorder of thought or mood.  ENDOCRINE:  No heat or cold intolerance, polyuria or polydipsia.  HEMATOLOGICAL:  No easy bruising or bleeding.     Physical Exam:  Vital Signs Last 24 Hrs  T(C): 36.9 (26 Nov 2021 11:04), Max: 37.5 (25 Nov 2021 20:14)  T(F): 98.5 (26 Nov 2021 11:04), Max: 99.5 (25 Nov 2021 20:14)  HR: 81 (26 Nov 2021 11:04) (81 - 88)  BP: 103/56 (26 Nov 2021 11:04) (103/56 - 128/62)  BP(mean): --  RR: 18 (26 Nov 2021 11:04) (18 - 18)  SpO2: 93% (26 Nov 2021 11:04) (92% - 96%)  GEN: NAD  HEENT: normocephalic and atraumatic. EOMI. PERRL.    NECK: Supple.  No lymphadenopathy   LUNGS: Clear to auscultation.  HEART: Regular rate and rhythm   ABDOMEN: Soft and nondistended.  Positive bowel sounds. mild lower abdominal tenderness   : No CVA tenderness  EXTREMITIES: Without any cyanosis, clubbing, rash, lesions or edema.  NEUROLOGIC: grossly intact.  PSYCHIATRIC: Appropriate affect .  SKIN: No rash       Labs:                        10.6   10.93 )-----------( 216      ( 26 Nov 2021 07:46 )             32.1     11-26    141  |  109<H>  |  25<H>  ----------------------------<  89  3.5   |  24  |  1.90<H>    Ca    9.2      26 Nov 2021 07:46  Phos  2.2     11-26  Mg     2.3     11-26    TPro  7.0  /  Alb  3.2<L>  /  TBili  0.4  /  DBili  x   /  AST  27  /  ALT  18  /  AlkPhos  43  11-25    Culture - Blood (collected 11-25-21 @ 01:15)  Source: .Blood Blood-Peripheral    Culture - Blood (collected 11-25-21 @ 01:15)  Source: .Blood Blood-Peripheral    Culture - Urine (collected 11-25-21 @ 00:53)  Source: Clean Catch Clean Catch (Midstream)  Final Report (11-26-21 @ 13:15):    <10,000 CFU/mL Normal Urogenital Dalila    WBC Count: 10.93 K/uL (11-26-21 @ 07:46)  WBC Count: 10.36 K/uL (11-25-21 @ 05:13)  WBC Count: 11.48 K/uL (11-24-21 @ 22:27)    Creatinine, Serum: 1.90 mg/dL (11-26-21 @ 07:46)  Creatinine, Serum: 1.90 mg/dL (11-25-21 @ 05:13)  Creatinine, Serum: 2.20 mg/dL (11-24-21 @ 22:27)    Ferritin, Serum: 334 ng/mL (11-26-21 @ 12:25)    COVID-19 PCR: NotDetec (11-24-21 @ 22:27)    All imaging and other data have been reviewed.  < from: CT Renal Stone Hunt (11.24.21 @ 23:16) >  IMPRESSION:  1. No nephrolithiasis, hydronephrosis or obstructive uropathy.  2. Mid to distal sigmoid colitis which is infectious, inflammatory or ischemic in etiology.    Assessment and Plan:   89 yo man with PMH of CAD, Parkinson's Disease and Bladder cancer s/p tumor resection was admitted after a fall. No LOC. He denies any symptoms except for weakness and mild lower abdominal pain. No nausea, vomiting, diarrhea or dysuria. Had some work up done, showed possible left sided colitis. Fall could be related to infectious process, since he had fever and colitis, will need to be ruled out for bacteremia.   UA negative  Tmax 103.1  Mild leukocytosis 10.4  CT with mid to distal sigmoid colitis     1- Fever and colitis  - Blood culture x 2 NGTD  - UA and UC negative   - Continue Ceftriaxone and metronidazole due to PCN allergy, no reaction   - Trend Creat to adjust ABx doses, today 1.9, renal on board     2- Fall and weakness   - Neurology consult noted   - Head CT noted  - Possibly 2ndry to infectious process and high fever in elderly    Will follow.    Mario Anderson MD  Division of Infectious Diseases   Cell [phone] between 8am and 6pm   After 6pm and weekends please call ID service at [phone].

## 2021-11-26 NOTE — PROGRESS NOTE ADULT - SUBJECTIVE AND OBJECTIVE BOX
Neurology follow up note    HAYDER [id number]      Interval History:    Patient feels ok no new complaints.    Allergies    clindamycin (Unknown)  Levaquin (Unknown)    Intolerances        MEDICATIONS    acetaminophen     Tablet .. 650 milliGRAM(s) Oral every 6 hours PRN  cefTRIAXone   IVPB 1000 milliGRAM(s) IV Intermittent every 24 hours  heparin   Injectable 5000 Unit(s) SubCutaneous every 12 hours  melatonin 3 milliGRAM(s) Oral at bedtime PRN  metroNIDAZOLE  IVPB 500 milliGRAM(s) IV Intermittent every 8 hours  potassium phosphate / sodium phosphate Powder (PHOS-NaK) 1 Packet(s) Oral every 6 hours  saccharomyces boulardii 250 milliGRAM(s) Oral two times a day              Vital Signs Last 24 Hrs  T(C): 36.9 (2021 11:04), Max: 37.5 (2021 20:14)  T(F): 98.5 (2021 11:04), Max: 99.5 (2021 20:14)  HR: 81 (2021 11:04) (81 - 88)  BP: 103/56 (2021 11:04) (103/56 - 128/62)  BP(mean): --  RR: 18 (2021 11:04) (18 - 18)  SpO2: 93% (2021 11:04) (92% - 96%)    REVIEW OF SYSTEMS:  Constitutional:  The patient denies fever, chills, or night sweats.  Head:  No headaches.  Eyes:  No double vision or blurry vision.  Ears:  No ringing in the ears.  Neck:  No neck pain.  Respiratory:  No shortness of breath.  Cardiovascular:  No chest pain.  Abdomen:  No nausea, vomiting, or abdominal pain.  Extremities/Neurological:  No numbness or tingling.  Musculoskeletal:  Occasional joint pain.    PHYSICAL EXAMINATION:   HEENT:  Head:  Normocephalic, atraumatic.  Eyes:  No scleral icterus.  Ears:  Hearing bilaterally was intact.  NECK:  Supple.  RESPIRATORY:  Good air entry bilaterally.  CARDIOVASCULAR:  S1 and S2 heard.  ABDOMEN:  Soft and nontender.  EXTREMITIES:  No clubbing or cyanosis was noted.      NEUROLOGIC:  The patient is awake and alert.  Location was hospital, year was , month was November.   Was able to follow complex commands, took right hand touch left ear.  Speech was fluent.  Smile was symmetric.  Motor:  Bilateral upper were 4/5, bilateral lower were 4-/5.  The patient has slight resting tremors.  Slight bradykinesia.              LABS:  CBC Full  -  ( 2021 07:46 )  WBC Count : 10.93 K/uL  RBC Count : 3.36 M/uL  Hemoglobin : 10.6 g/dL  Hematocrit : 32.1 %  Platelet Count - Automated : 216 K/uL  Mean Cell Volume : 95.5 fl  Mean Cell Hemoglobin : 31.5 pg  Mean Cell Hemoglobin Concentration : 33.0 gm/dL  Auto Neutrophil # : x  Auto Lymphocyte # : x  Auto Monocyte # : x  Auto Eosinophil # : x  Auto Basophil # : x  Auto Neutrophil % : x  Auto Lymphocyte % : x  Auto Monocyte % : x  Auto Eosinophil % : x  Auto Basophil % : x    Urinalysis Basic - ( 2021 03:24 )    Color: Yellow / Appearance: Slightly Turbid / S.020 / pH: x  Gluc: x / Ketone: Small  / Bili: Negative / Urobili: Negative   Blood: x / Protein: 100 / Nitrite: Negative   Leuk Esterase: Negative / RBC: 0-2 /HPF / WBC 0-2   Sq Epi: x / Non Sq Epi: Few / Bacteria: Moderate      -    141  |  109<H>  |  25<H>  ----------------------------<  89  3.5   |  24  |  1.90<H>    Ca    9.2      2021 07:46  Phos  2.2     -  Mg     2.3     -    TPro  7.0  /  Alb  3.2<L>  /  TBili  0.4  /  DBili  x   /  AST  27  /  ALT  18  /  AlkPhos  43  11-25    Hemoglobin A1C:     LIVER FUNCTIONS - ( 2021 05:13 )  Alb: 3.2 g/dL / Pro: 7.0 g/dL / ALK PHOS: 43 U/L / ALT: 18 U/L / AST: 27 U/L / GGT: x           Vitamin B12   PT/INR - ( 2021 22:27 )   PT: 13.3 sec;   INR: 1.14 ratio         PTT - ( 2021 22:27 )  PTT:28.8 sec      RADIOLOGY    ANALYSIS AND PLAN:  A 89-year-old with an episode of dizziness, fall, and Parkinson's.  For episode of fall, suspect this could be secondary to age-related changes, balance issues, underlying Parkinson's, slight dehydration, possibly episode of presyncopal event.  The examination does not show any signs at present to suggest a new cerebrovascular accident has ensued.  Telemetry evaluation as needed.  Monitor systolic blood pressure.  For mild cognitive impairment supportive treatment   For history of Parkinson's disease, the patient appears to be stable, currently on no medications.  For episodes of dizziness, monitor systolic blood pressure.  physical therapy   as tolerated      Attempting to speak with the spouse, Keyla, at [phone].   She appears to have some underlying mild cognitive impairment as well.    Greater than 40 minutes of time was spent with the patient, plan of care, reviewing data, speaking to the multidisciplinary healthcare team with greater than 50% of that time in counseling and care coordination.    Thank you for the courtesy of this consultation.

## 2021-11-26 NOTE — PROGRESS NOTE ADULT - PROBLEM SELECTOR PLAN 5
VTE ppx: Heparin 5000U subQ  - fall/aspiration precautions  - GOC discussed with patient: States that he has not thought of this before but had been meaning to discuss with his family. Would like to make a family decision. Full Code for now. Patient asymptomatic. UA not impressive. UA trace ketones, 100 protein, moderate blood, moderate bacteria, 3-5 granular casts.   - f/up UCx  - Tylenol PRN for fever   - Infectious Disease consulted (Dr. Anderson); recs appreciated Patient asymptomatic. UA not impressive. UA trace ketones, 100 protein, moderate blood, moderate bacteria, 3-5 granular casts.   - f/up UCx - negative  - Tylenol PRN for fever   - Infectious Disease consulted (Dr. Anderson); recs appreciated

## 2021-11-26 NOTE — DIETITIAN INITIAL EVALUATION ADULT. - PROBLEM SELECTOR PLAN 5
VTE ppx: Heparin 5000U subQ  - fall/aspiration precautions  - GOC discussed with patient: States that he has not thought of this before but had been meaning to discuss with his family. Would like to make a family decision. Full Code for now.

## 2021-11-26 NOTE — DIETITIAN INITIAL EVALUATION ADULT. - COLLABORATION WITH OTHER PROVIDERS
follow diet tolerance and consider speech evaluation as appropriate  patient with history Parkinson's

## 2021-11-26 NOTE — PROGRESS NOTE ADULT - PROBLEM SELECTOR PLAN 2
Patient came in after a fall from chair. Patient states that he lost balance and fell backward but does not remember details of what happened after. Mechanical fall vs. syncope. CT head showed no acute pathology - lateral ventricles dilated out of proportion to the sulcal prominence which could be due to central atrophy versus normal pressure hydrocephalus.  - Continue to monitor   - Orthostatic Vital signs  - Fall precautious   - c/w tele monitoring  - f/up TTE  - f/u US LE dopplers to r/o dvt -> unoffical read (-)  - d-dimer elevated: 764  - f/u VQ scan urgent to r/o PE  - Cardiology consulted, f/u recs  - Neurology Consulted Dr. Rudolph  - PT evaluation Patient came in after a fall from chair. Patient states that he lost balance and fell backward but does not remember details of what happened after. Mechanical fall vs. syncope. CT head showed no acute pathology - lateral ventricles dilated out of proportion to the sulcal prominence which could be due to central atrophy versus normal pressure hydrocephalus.  - Continue to monitor   - Orthostatic Vital signs  - Fall precautious   - Neurology Consulted Dr. Rudolph  - PT evaluation - Patient came in after a fall from chair. Patient states that he lost balance and fell backward but does not remember details of what happened after. Mechanical fall vs. syncope.   - CT head showed no acute pathology - lateral ventricles dilated out of proportion to the sulcal prominence which could be due to central atrophy versus normal pressure hydrocephalus, but could also be due to atrophy  - Continue to monitor   - Orthostatic Vital signs  - Fall precautious   - Neurology Consulted Dr. Rudolph, recs appreciated   - PT evaluation - rec ALEXIA

## 2021-11-26 NOTE — DIETITIAN INITIAL EVALUATION ADULT. - PROBLEM SELECTOR PLAN 3
SMITH vs CKD  Patient has no known history of kidney disease. Unknown creatinine/BUN baseline. On admission it was 2.20/34.   - F/U Urine Indices   - C/W Lactated Ringers @ 75mL/hr   - F/U US of Kidneys   - Monitor CMP daily   - Nephrology Consulted (Dr. Freeman); F/U Recommendations

## 2021-11-26 NOTE — PHYSICAL THERAPY INITIAL EVALUATION ADULT - BALANCE DISTURBANCE, SYSTEM IMPAIRMENT CONTRIBUTE, REHAB EVAL
musculoskeletal syncopal episode vs tia vs seizure. ct-h, vbg (lactate), ekg, admit for observation vs syncopal work up syncopal episode vs tia vs seizure. ct-h, vbg (lactate), ekg, admit for observation vs syncopal work up.

## 2021-11-26 NOTE — PROGRESS NOTE ADULT - PROBLEM SELECTOR PLAN 1
met sepsis criteria on admit. pt has continued abdominal pain on PE, afebrile overnight and HDS likely 2/2 colitis  - CT scan -> Mid to distal sigmoid colitis which is infectious, inflammatory or ischemic in etiology.  - C/W Rocephin & Flagyl for now  - hold Lactated Ringers @ 75mL/hr per nephrology  - F/U GI PCR as diarrhea is present  - F/U BC x 2  - Tylenol PRN for fever   - Infectious Disease consulted (Dr. Anderson); recs appreciated - met sepsis criteria on admit, likely due to colitis. Pt has abdominal tenderness on PE, afebrile overnight and HDS  - CT scan -> Mid to distal sigmoid colitis which is infectious, inflammatory or ischemic in etiology.  - C/W Rocephin & Flagyl for now  - F/U GI PCR as diarrhea is present now  - F/U BCx x 2 - NGTD  - Tylenol PRN for fever   - Infectious Disease consulted (Dr. Anderson); recs appreciated

## 2021-11-26 NOTE — DIETITIAN INITIAL EVALUATION ADULT. - PROBLEM SELECTOR PLAN 4
Patient asymptomatic. UA not impressive. UA trace ketones, 100 protein, moderate blood, moderate bacteria, 3-5 granular casts.   - F/U UCx  - Tylenol PRN for fever   - Infectious Disease consulted (Dr. Anderson); F/U Recommendations

## 2021-11-26 NOTE — DIETITIAN INITIAL EVALUATION ADULT. - OTHER INFO
89 year old male Dx UTI sigmoid colitis fall vs syncope   no food allergies noted   semi loose BM frequently per flow sheets  seen with orbital loss noted visually patient with facial hair unable to determine further loss muscle /fat (patient sleeping)   Phos low supplemented

## 2021-11-26 NOTE — PROGRESS NOTE ADULT - PROBLEM SELECTOR PLAN 6
VTE ppx: Heparin 5000U subQ  - fall/aspiration precautions  - GOC discussed with patient: States that he has not thought of this before but had been meaning to discuss with his family. Would like to make a family decision. Full Code for now. VTE ppx: HSQ  - fall/aspiration precautions  - GOC discussed with patient: States that he has not thought of this before but had been meaning to discuss with his family. Would like to make a family decision. Full Code for now.

## 2021-11-26 NOTE — DIETITIAN INITIAL EVALUATION ADULT. - ADD RECOMMEND
1. recommend add MVI and Vit C 500mg BID 2. recommend ensure enlive BID 3. follow on low fiber diet with  colitis 4.follow Phos levels 1. recommend add MVI and Vit C 500mg BID 2. recommend ensure clear BID and prosource 30ml daily 3. follow on low fiber diet with  colitis 4.follow Phos levels

## 2021-11-26 NOTE — DIETITIAN INITIAL EVALUATION ADULT. - PROBLEM SELECTOR PLAN 2
Patient came in after a fall from chair. Patient states that he lost balance and fell backward but does not remember details of what happened after. Mechanical fall vs. syncope. CT head showed no acute pathology - lateral ventricles dilated out of proportion to the sulcal prominence which could be due to central atrophy versus normal pressure hydrocephalus.  - Continue to monitor   - Orthostatic Vital signs  - Fall precautious   - TELE monitor, check CE's  - Cardiac murmur on exam - check 2D ECHO  - Neurology Consulted Dr. Rudolph  - PT evaluation

## 2021-11-26 NOTE — DIETITIAN INITIAL EVALUATION ADULT. - PROBLEM SELECTOR PLAN 1
Patient came in for fall vs syncope. Patient does not have any N/V/D. On PE patient had fever of 103 abdomen tender to palpation. Labs show elevated wbc 11.48. Lactate WNL 1.2. CT scan showed Mid to distal sigmoid colitis which is infectious, inflammatory or ischemic in etiology.  - C/W Rocephin & Flagyl for now  - Meeting sepsis criteria on admission (fever, HR >90, colitis)  - C/W Lactated Ringers @ 75mL/hr   - F/U GI PCR if diarrhea is present (patient denies)  - F/U BC x 2  - Tylenol PRN for fever   - Infectious Disease consulted (Dr. Anderson); F/U Recommendations

## 2021-11-26 NOTE — PHYSICAL THERAPY INITIAL EVALUATION ADULT - PERTINENT HX OF CURRENT PROBLEM, REHAB EVAL
89 yo M w/ PMHx of Parkinson's Disease (on no medications), CAD (s/p stent), Bladder cancer (s/p tumor resection) presented to the ED after a fall. Admitted for workup of fall and treatment of sigmoid colitis and SMITH. Rule out syncopal event.

## 2021-11-26 NOTE — PHYSICAL THERAPY INITIAL EVALUATION ADULT - ADDITIONAL COMMENTS
Patient reports that he lives in a private house with his wife. No steps to enter, bedroom upstairs. Reports that he ambulates with a cane at baseline.

## 2021-11-26 NOTE — PROGRESS NOTE ADULT - PROBLEM SELECTOR PLAN 4
Patient asymptomatic. UA not impressive. UA trace ketones, 100 protein, moderate blood, moderate bacteria, 3-5 granular casts.   - f/up UCx  - Tylenol PRN for fever   - Infectious Disease consulted (Dr. Anderson); recs appreciated SMITH vs CKD  Patient has no known history of kidney disease. Unknown creatinine/BUN baseline. On admission it was 2.20/34.   - F/U Urine Indices   - hold Lactated Ringers @ 75mL/hr per nephrology  - US of kidney: technically limited, R renal pelviectasis, large b/l renal cysts w/ complex appearing cyst R kidney  - Monitor CMP daily   - Nephrology Consulted (Dr. Freeman); recs appreciated SMITH vs CKD  - Patient has no known history of kidney disease, but also with very poor outpt f/up and is a poor historian. Unknown creatinine/BUN baseline. On admission it was 2.20/34.   - F/U Urine Indices   - hold Lactated Ringers @ 75mL/hr per nephrology  - US of kidney: technically limited, R renal pelviectasis, large b/l renal cysts w/ complex appearing cyst R kidney  - Monitor CMP daily   - Nephrology Consulted (Dr. Freeman group); recs appreciated

## 2021-11-26 NOTE — PROGRESS NOTE ADULT - ASSESSMENT
89 yo M w/ PMHx of Parkinson's Disease (on no medications), CAD (s/p stent), Bladder cancer (s/p tumor resection) presented to the ED after a fall. Admitted for workup of fall and treatment of sigmoid colitis and SMITH. Rule out syncopal event. 89yo M w/ PMHx of Parkinson's Disease (on no medications), CAD (s/p stent), Bladder cancer (s/p tumor resection) presented to the ED after a fall, a/w sigmoid colitis and possible SMITH.

## 2021-11-26 NOTE — PROGRESS NOTE ADULT - SUBJECTIVE AND OBJECTIVE BOX
Patient is a 89y old  Male who presents with a chief complaint of Sigmoid Colitis (2021 11:07)      INTERVAL HPI/OVERNIGHT EVENTS: Patient seen and examined at bedside. Overnight on tele, patient was alternating between afib and NSR @80's-90's remaing HDS. This am patient reports c/o fatigue, mild headache and diarrhea last night. Patient is urinating well without dysuria in May, changes in urinary frequency or hematuria/pyuria. Denies fevers, chills, lightheadedness, chest pain, dyspnea, n/v.    MEDICATIONS  (STANDING):  cefTRIAXone   IVPB 1000 milliGRAM(s) IV Intermittent every 24 hours  heparin   Injectable 5000 Unit(s) SubCutaneous every 8 hours  metroNIDAZOLE  IVPB 500 milliGRAM(s) IV Intermittent every 8 hours  potassium phosphate / sodium phosphate Powder (PHOS-NaK) 1 Packet(s) Oral every 6 hours  saccharomyces boulardii 250 milliGRAM(s) Oral two times a day    MEDICATIONS  (PRN):  acetaminophen     Tablet .. 650 milliGRAM(s) Oral every 6 hours PRN Temp greater or equal to 38C (100.4F), Mild Pain (1 - 3)  melatonin 3 milliGRAM(s) Oral at bedtime PRN Insomnia      Allergies    clindamycin (Unknown)  Levaquin (Unknown)    Intolerances        REVIEW OF SYSTEMS:  CONSTITUTIONAL: No fever or chills  HEENT:  Mild headache, no sore throat  RESPIRATORY: No cough, wheezing, or shortness of breath  CARDIOVASCULAR: No chest pain, palpitations  GASTROINTESTINAL: Diarrhea yesterday, generalized abd pain, no nausea or vomiting  GENITOURINARY: No dysuria, frequency, or hematuria  NEUROLOGICAL: no focal weakness or dizziness  MUSCULOSKELETAL: no myalgias     Vital Signs Last 24 Hrs  T(C): 36.9 (2021 11:04), Max: 37.5 (2021 20:14)  T(F): 98.5 (2021 11:04), Max: 99.5 (2021 20:14)  HR: 81 (2021 11:04) (81 - 88)  BP: 103/56 (2021 11:04) (103/56 - 128/62)  BP(mean): --  RR: 18 (2021 11:04) (18 - 18)  SpO2: 93% (2021 11:04) (92% - 96%)    PHYSICAL EXAM:  GENERAL: NAD  HEENT:  anicteric, moist mucous membranes  CHEST/LUNG:  CTA b/l, no rales, wheezes, or rhonchi  HEART:  RRR, S1, S2  ABDOMEN:  BS+, soft, mild generalized abdominal tenderness, nondistended  EXTREMITIES: moderate LLE edema, mild RLE edema, no cyanosis, or calf tenderness  NERVOUS SYSTEM: alert and answers questions and follows commands appropriately    LABS:                        10.6   10.93 )-----------( 216      ( 2021 07:46 )             32.1     CBC Full  -  ( 2021 07:46 )  WBC Count : 10.93 K/uL  Hemoglobin : 10.6 g/dL  Hematocrit : 32.1 %  Platelet Count - Automated : 216 K/uL  Mean Cell Volume : 95.5 fl  Mean Cell Hemoglobin : 31.5 pg  Mean Cell Hemoglobin Concentration : 33.0 gm/dL  Auto Neutrophil # : x  Auto Lymphocyte # : x  Auto Monocyte # : x  Auto Eosinophil # : x  Auto Basophil # : x  Auto Neutrophil % : x  Auto Lymphocyte % : x  Auto Monocyte % : x  Auto Eosinophil % : x  Auto Basophil % : x    2021 07:46    141    |  109    |  25     ----------------------------<  89     3.5     |  24     |  1.90     Ca    9.2        2021 07:46  Phos  2.2       2021 07:46  Mg     2.3       2021 07:46      PT/INR - ( 2021 22:27 )   PT: 13.3 sec;   INR: 1.14 ratio         PTT - ( 2021 22:27 )  PTT:28.8 sec  Urinalysis Basic - ( 2021 03:24 )    Color: Yellow / Appearance: Slightly Turbid / S.020 / pH: x  Gluc: x / Ketone: Small  / Bili: Negative / Urobili: Negative   Blood: x / Protein: 100 / Nitrite: Negative   Leuk Esterase: Negative / RBC: 0-2 /HPF / WBC 0-2   Sq Epi: x / Non Sq Epi: Few / Bacteria: Moderate      CAPILLARY BLOOD GLUCOSE            Culture - Blood (collected 21 @ 01:15)  Source: .Blood Blood-Peripheral  Preliminary Report (21 @ 02:02):    No growth to date.    Culture - Blood (collected 21 @ 01:15)  Source: .Blood Blood-Peripheral  Preliminary Report (21 @ 02:02):    No growth to date.        RADIOLOGY & ADDITIONAL TESTS:    < from: US Renal (21 @ 08:13) >  EXAM:  US KIDNEY(S)                            PROCEDURE DATE:  2021          INTERPRETATION:  CLINICAL INFORMATION: Renal failure.    COMPARISON: CT scan abdomen pelvis 2021.    TECHNIQUE: Sonography of the kidneys and bladder.    FINDINGS:  Examination is technically limited, patient not able to adequately position.    Right kidney: 10 cm.  Mild fullness right renal pelvis.  7 cm cyst with dependent echogenic debris.  3.4 cm cyst at the lower pole.    Left kidney: 11.5 cm. Evaluation of the left kidney is technically limited due to shadowing.  No hydronephrosis noted.  Large 8.5 cm cyst.    Urinary bladder: Within normal limits.  Patient is not able to void for this exam.    IMPRESSION:    Technically limited study.    Right renal pelviectasis.    Large bilateral renal cysts, with complex appearing cyst right kidney.    --- End of Report ---            MARCELLO SALES MD; Attending Radiologist  This document has been electronically signed. 2021  8:12AM    < end of copied text >      Personally reviewed.     Consultant(s) Notes Reviewed:  [x] YES  [ ] NO     Patient is a 89y old  Male who presents with a chief complaint of fall.      INTERVAL HPI/OVERNIGHT EVENTS: Patient seen and examined at bedside. Overnight on tele, the tele tech read strips as patient having possible alternating afib and NSR @80's-90's. This am patient reports c/o fatigue, mild headache and loose BMs last night. Patient is urinating well without dysuria in May, changes in urinary frequency or hematuria/pyuria. Denies fevers, chills, lightheadedness, chest pain, dyspnea, n/v.    MEDICATIONS  (STANDING):  cefTRIAXone   IVPB 1000 milliGRAM(s) IV Intermittent every 24 hours  heparin   Injectable 5000 Unit(s) SubCutaneous every 8 hours  metroNIDAZOLE  IVPB 500 milliGRAM(s) IV Intermittent every 8 hours  potassium phosphate / sodium phosphate Powder (PHOS-NaK) 1 Packet(s) Oral every 6 hours  saccharomyces boulardii 250 milliGRAM(s) Oral two times a day    MEDICATIONS  (PRN):  acetaminophen     Tablet .. 650 milliGRAM(s) Oral every 6 hours PRN Temp greater or equal to 38C (100.4F), Mild Pain (1 - 3)  melatonin 3 milliGRAM(s) Oral at bedtime PRN Insomnia      Allergies    clindamycin (Unknown)  Levaquin (Unknown)    Intolerances        REVIEW OF SYSTEMS:  CONSTITUTIONAL: No fever or chills  HEENT:  Mild headache, no sore throat  RESPIRATORY: No cough, wheezing, or shortness of breath  CARDIOVASCULAR: No chest pain, palpitations  GASTROINTESTINAL: Diarrhea yesterday, generalized abd pain, no nausea or vomiting  GENITOURINARY: No dysuria, frequency, or hematuria  NEUROLOGICAL: no focal weakness or dizziness  MUSCULOSKELETAL: no myalgias     Vital Signs Last 24 Hrs  T(C): 36.9 (2021 11:04), Max: 37.5 (2021 20:14)  T(F): 98.5 (2021 11:04), Max: 99.5 (2021 20:14)  HR: 81 (2021 11:04) (81 - 88)  BP: 103/56 (2021 11:04) (103/56 - 128/62)  BP(mean): --  RR: 18 (2021 11:04) (18 - 18)  SpO2: 93% (2021 11:04) (92% - 96%)    PHYSICAL EXAM:  GENERAL: NAD  HEENT:  anicteric, moist mucous membranes  CHEST/LUNG:  CTA b/l, no rales, wheezes, or rhonchi  HEART:  RRR, S1, S2  ABDOMEN:  BS+, soft, mild abdominal tenderness worst at LLQ, no guarding, nondistended  EXTREMITIES: moderate LLE edema, mild RLE edema, no cyanosis, or calf tenderness  NERVOUS SYSTEM: alert and answers questions and follows commands appropriately    LABS:                        10.6   10.93 )-----------( 216      ( 2021 07:46 )             32.1     CBC Full  -  ( 2021 07:46 )  WBC Count : 10.93 K/uL  Hemoglobin : 10.6 g/dL  Hematocrit : 32.1 %  Platelet Count - Automated : 216 K/uL  Mean Cell Volume : 95.5 fl  Mean Cell Hemoglobin : 31.5 pg  Mean Cell Hemoglobin Concentration : 33.0 gm/dL  Auto Neutrophil # : x  Auto Lymphocyte # : x  Auto Monocyte # : x  Auto Eosinophil # : x  Auto Basophil # : x  Auto Neutrophil % : x  Auto Lymphocyte % : x  Auto Monocyte % : x  Auto Eosinophil % : x  Auto Basophil % : x    2021 07:46    141    |  109    |  25     ----------------------------<  89     3.5     |  24     |  1.90     Ca    9.2        2021 07:46  Phos  2.2       2021 07:46  Mg     2.3       2021 07:46      PT/INR - ( 2021 22:27 )   PT: 13.3 sec;   INR: 1.14 ratio         PTT - ( 2021 22:27 )  PTT:28.8 sec  Urinalysis Basic - ( 2021 03:24 )    Color: Yellow / Appearance: Slightly Turbid / S.020 / pH: x  Gluc: x / Ketone: Small  / Bili: Negative / Urobili: Negative   Blood: x / Protein: 100 / Nitrite: Negative   Leuk Esterase: Negative / RBC: 0-2 /HPF / WBC 0-2   Sq Epi: x / Non Sq Epi: Few / Bacteria: Moderate      CAPILLARY BLOOD GLUCOSE            Culture - Blood (collected 21 @ 01:15)  Source: .Blood Blood-Peripheral  Preliminary Report (21 @ 02:02):    No growth to date.    Culture - Blood (collected 21 @ 01:15)  Source: .Blood Blood-Peripheral  Preliminary Report (21 @ 02:02):    No growth to date.        RADIOLOGY & ADDITIONAL TESTS:    < from: US Renal (21 @ 08:13) >  EXAM:  US KIDNEY(S)                            PROCEDURE DATE:  2021          INTERPRETATION:  CLINICAL INFORMATION: Renal failure.    COMPARISON: CT scan abdomen pelvis 2021.    TECHNIQUE: Sonography of the kidneys and bladder.    FINDINGS:  Examination is technically limited, patient not able to adequately position.    Right kidney: 10 cm.  Mild fullness right renal pelvis.  7 cm cyst with dependent echogenic debris.  3.4 cm cyst at the lower pole.    Left kidney: 11.5 cm. Evaluation of the left kidney is technically limited due to shadowing.  No hydronephrosis noted.  Large 8.5 cm cyst.    Urinary bladder: Within normal limits.  Patient is not able to void for this exam.    IMPRESSION:    Technically limited study.    Right renal pelviectasis.    Large bilateral renal cysts, with complex appearing cyst right kidney.    --- End of Report ---            MARCELLO SALES MD; Attending Radiologist  This document has been electronically signed. 2021  8:12AM    < end of copied text >      Personally reviewed.     Consultant(s) Notes Reviewed:  [x] YES  [ ] NO

## 2021-11-27 DIAGNOSIS — E87.8 OTHER DISORDERS OF ELECTROLYTE AND FLUID BALANCE, NOT ELSEWHERE CLASSIFIED: ICD-10-CM

## 2021-11-27 DIAGNOSIS — D64.9 ANEMIA, UNSPECIFIED: ICD-10-CM

## 2021-11-27 LAB
ANION GAP SERPL CALC-SCNC: 5 MMOL/L — SIGNIFICANT CHANGE UP (ref 5–17)
ANION GAP SERPL CALC-SCNC: 8 MMOL/L — SIGNIFICANT CHANGE UP (ref 5–17)
BASOPHILS # BLD AUTO: 0.03 K/UL — SIGNIFICANT CHANGE UP (ref 0–0.2)
BASOPHILS NFR BLD AUTO: 0.3 % — SIGNIFICANT CHANGE UP (ref 0–2)
BUN SERPL-MCNC: 20 MG/DL — SIGNIFICANT CHANGE UP (ref 7–23)
BUN SERPL-MCNC: 22 MG/DL — SIGNIFICANT CHANGE UP (ref 7–23)
CALCIUM SERPL-MCNC: 8.6 MG/DL — SIGNIFICANT CHANGE UP (ref 8.5–10.1)
CALCIUM SERPL-MCNC: 8.7 MG/DL — SIGNIFICANT CHANGE UP (ref 8.5–10.1)
CHLORIDE SERPL-SCNC: 109 MMOL/L — HIGH (ref 96–108)
CHLORIDE SERPL-SCNC: 112 MMOL/L — HIGH (ref 96–108)
CO2 SERPL-SCNC: 23 MMOL/L — SIGNIFICANT CHANGE UP (ref 22–31)
CO2 SERPL-SCNC: 24 MMOL/L — SIGNIFICANT CHANGE UP (ref 22–31)
CREAT SERPL-MCNC: 1.8 MG/DL — HIGH (ref 0.5–1.3)
CREAT SERPL-MCNC: 1.8 MG/DL — HIGH (ref 0.5–1.3)
CULTURE RESULTS: SIGNIFICANT CHANGE UP
EOSINOPHIL # BLD AUTO: 0.31 K/UL — SIGNIFICANT CHANGE UP (ref 0–0.5)
EOSINOPHIL NFR BLD AUTO: 2.9 % — SIGNIFICANT CHANGE UP (ref 0–6)
GLUCOSE SERPL-MCNC: 104 MG/DL — HIGH (ref 70–99)
GLUCOSE SERPL-MCNC: 108 MG/DL — HIGH (ref 70–99)
HCT VFR BLD CALC: 32.1 % — LOW (ref 39–50)
HGB BLD-MCNC: 10.5 G/DL — LOW (ref 13–17)
IMM GRANULOCYTES NFR BLD AUTO: 5.6 % — HIGH (ref 0–1.5)
LYMPHOCYTES # BLD AUTO: 0.63 K/UL — LOW (ref 1–3.3)
LYMPHOCYTES # BLD AUTO: 5.9 % — LOW (ref 13–44)
MCHC RBC-ENTMCNC: 31.3 PG — SIGNIFICANT CHANGE UP (ref 27–34)
MCHC RBC-ENTMCNC: 32.7 GM/DL — SIGNIFICANT CHANGE UP (ref 32–36)
MCV RBC AUTO: 95.5 FL — SIGNIFICANT CHANGE UP (ref 80–100)
MONOCYTES # BLD AUTO: 1.58 K/UL — HIGH (ref 0–0.9)
MONOCYTES NFR BLD AUTO: 14.7 % — HIGH (ref 2–14)
NEUTROPHILS # BLD AUTO: 7.57 K/UL — HIGH (ref 1.8–7.4)
NEUTROPHILS NFR BLD AUTO: 70.6 % — SIGNIFICANT CHANGE UP (ref 43–77)
NRBC # BLD: 0 /100 WBCS — SIGNIFICANT CHANGE UP (ref 0–0)
PHOSPHATE SERPL-MCNC: 3.4 MG/DL — SIGNIFICANT CHANGE UP (ref 2.5–4.5)
PLATELET # BLD AUTO: 230 K/UL — SIGNIFICANT CHANGE UP (ref 150–400)
POTASSIUM SERPL-MCNC: 3.1 MMOL/L — LOW (ref 3.5–5.3)
POTASSIUM SERPL-MCNC: 4.1 MMOL/L — SIGNIFICANT CHANGE UP (ref 3.5–5.3)
POTASSIUM SERPL-SCNC: 3.1 MMOL/L — LOW (ref 3.5–5.3)
POTASSIUM SERPL-SCNC: 4.1 MMOL/L — SIGNIFICANT CHANGE UP (ref 3.5–5.3)
RBC # BLD: 3.36 M/UL — LOW (ref 4.2–5.8)
RBC # FLD: 19.8 % — HIGH (ref 10.3–14.5)
SODIUM SERPL-SCNC: 140 MMOL/L — SIGNIFICANT CHANGE UP (ref 135–145)
SODIUM SERPL-SCNC: 141 MMOL/L — SIGNIFICANT CHANGE UP (ref 135–145)
SPECIMEN SOURCE: SIGNIFICANT CHANGE UP
WBC # BLD: 10.72 K/UL — HIGH (ref 3.8–10.5)
WBC # FLD AUTO: 10.72 K/UL — HIGH (ref 3.8–10.5)

## 2021-11-27 PROCEDURE — 99232 SBSQ HOSP IP/OBS MODERATE 35: CPT

## 2021-11-27 PROCEDURE — 93306 TTE W/DOPPLER COMPLETE: CPT | Mod: 26

## 2021-11-27 PROCEDURE — 99233 SBSQ HOSP IP/OBS HIGH 50: CPT | Mod: GC

## 2021-11-27 RX ORDER — POTASSIUM CHLORIDE 20 MEQ
40 PACKET (EA) ORAL ONCE
Refills: 0 | Status: COMPLETED | OUTPATIENT
Start: 2021-11-27 | End: 2021-11-27

## 2021-11-27 RX ORDER — POTASSIUM PHOSPHATE, MONOBASIC POTASSIUM PHOSPHATE, DIBASIC 236; 224 MG/ML; MG/ML
30 INJECTION, SOLUTION INTRAVENOUS ONCE
Refills: 0 | Status: COMPLETED | OUTPATIENT
Start: 2021-11-27 | End: 2021-11-27

## 2021-11-27 RX ORDER — POTASSIUM CHLORIDE 20 MEQ
40 PACKET (EA) ORAL EVERY 4 HOURS
Refills: 0 | Status: DISCONTINUED | OUTPATIENT
Start: 2021-11-27 | End: 2021-11-27

## 2021-11-27 RX ADMIN — POTASSIUM PHOSPHATE, MONOBASIC POTASSIUM PHOSPHATE, DIBASIC 83.33 MILLIMOLE(S): 236; 224 INJECTION, SOLUTION INTRAVENOUS at 11:59

## 2021-11-27 RX ADMIN — Medication 100 MILLIGRAM(S): at 05:14

## 2021-11-27 RX ADMIN — Medication 81 MILLIGRAM(S): at 10:54

## 2021-11-27 RX ADMIN — HEPARIN SODIUM 5000 UNIT(S): 5000 INJECTION INTRAVENOUS; SUBCUTANEOUS at 05:14

## 2021-11-27 RX ADMIN — HEPARIN SODIUM 5000 UNIT(S): 5000 INJECTION INTRAVENOUS; SUBCUTANEOUS at 13:08

## 2021-11-27 RX ADMIN — Medication 250 MILLIGRAM(S): at 17:21

## 2021-11-27 RX ADMIN — HEPARIN SODIUM 5000 UNIT(S): 5000 INJECTION INTRAVENOUS; SUBCUTANEOUS at 21:05

## 2021-11-27 RX ADMIN — Medication 100 MILLIGRAM(S): at 13:07

## 2021-11-27 RX ADMIN — Medication 40 MILLIEQUIVALENT(S): at 10:55

## 2021-11-27 RX ADMIN — Medication 250 MILLIGRAM(S): at 05:14

## 2021-11-27 NOTE — PROGRESS NOTE ADULT - ASSESSMENT
89yo M w/ PMHx of Parkinson's Disease (on no medications), CAD (s/p stent), Bladder cancer (s/p tumor resection) presented to the ED after a fall, a/w sigmoid colitis and possible SMITH.

## 2021-11-27 NOTE — PROGRESS NOTE ADULT - SUBJECTIVE AND OBJECTIVE BOX
Neurology follow up note    HAYDER [id number]      Interval History:    Patient feels ok no new complaints.    Allergies    clindamycin (Unknown)  Levaquin (Unknown)    Intolerances        MEDICATIONS    acetaminophen     Tablet .. 650 milliGRAM(s) Oral every 6 hours PRN  aspirin  chewable 81 milliGRAM(s) Oral daily  cefTRIAXone   IVPB 1000 milliGRAM(s) IV Intermittent every 24 hours  heparin   Injectable 5000 Unit(s) SubCutaneous every 8 hours  melatonin 3 milliGRAM(s) Oral at bedtime PRN  metroNIDAZOLE  IVPB 500 milliGRAM(s) IV Intermittent every 8 hours  saccharomyces boulardii 250 milliGRAM(s) Oral two times a day              Vital Signs Last 24 Hrs  T(C): 37.1 (27 Nov 2021 04:12), Max: 37.1 (27 Nov 2021 04:12)  T(F): 98.8 (27 Nov 2021 04:12), Max: 98.8 (27 Nov 2021 04:12)  HR: 81 (27 Nov 2021 04:12) (81 - 83)  BP: 120/67 (27 Nov 2021 04:12) (115/63 - 120/67)  BP(mean): --  RR: 18 (27 Nov 2021 04:12) (18 - 18)  SpO2: 95% (27 Nov 2021 04:12) (95% - 98%)      REVIEW OF SYSTEMS:  Constitutional:  The patient denies fever, chills, or night sweats.  Head:  No headaches.  Eyes:  No double vision or blurry vision.  Ears:  No ringing in the ears.  Neck:  No neck pain.  Respiratory:  No shortness of breath.  Cardiovascular:  No chest pain.  Abdomen:  No nausea, vomiting, or abdominal pain.  Extremities/Neurological:  No numbness or tingling.  Musculoskeletal:  Occasional joint pain.    PHYSICAL EXAMINATION:   HEENT:  Head:  Normocephalic, atraumatic.  Eyes:  No scleral icterus.  Ears:  Hearing bilaterally was intact.  NECK:  Supple.  RESPIRATORY:  Good air entry bilaterally.  CARDIOVASCULAR:  S1 and S2 heard.  ABDOMEN:  Soft and nontender.  EXTREMITIES:  No clubbing or cyanosis was noted.      NEUROLOGIC:  The patient is awake and alert.  Location was hospital,    Was able to follow complex commands, took right hand touch left ear.  Speech was fluent.  Smile was symmetric.  Motor:  Bilateral upper were 4/5, bilateral lower were 4-/5.  The patient has slight resting tremors.  Slight bradykinesia.              LABS:  CBC Full  -  ( 27 Nov 2021 07:30 )  WBC Count : 10.72 K/uL  RBC Count : 3.36 M/uL  Hemoglobin : 10.5 g/dL  Hematocrit : 32.1 %  Platelet Count - Automated : 230 K/uL  Mean Cell Volume : 95.5 fl  Mean Cell Hemoglobin : 31.3 pg  Mean Cell Hemoglobin Concentration : 32.7 gm/dL  Auto Neutrophil # : 7.57 K/uL  Auto Lymphocyte # : 0.63 K/uL  Auto Monocyte # : 1.58 K/uL  Auto Eosinophil # : 0.31 K/uL  Auto Basophil # : 0.03 K/uL  Auto Neutrophil % : 70.6 %  Auto Lymphocyte % : 5.9 %  Auto Monocyte % : 14.7 %  Auto Eosinophil % : 2.9 %  Auto Basophil % : 0.3 %      11-27    140  |  109<H>  |  22  ----------------------------<  104<H>  3.1<L>   |  23  |  1.80<H>    Ca    8.6      27 Nov 2021 07:30  Phos  1.5     11-27  Mg     2.3     11-26      Hemoglobin A1C:       Vitamin B12         RADIOLOGY      ANALYSIS AND PLAN:  A 89-year-old with an episode of dizziness, fall, and Parkinson's.  For episode of fall, suspect this could be secondary to age-related changes, balance issues, underlying Parkinson's, slight dehydration, possibly episode of presyncopal event.  The examination does not show any signs at present to suggest a new cerebrovascular accident has ensued.  Telemetry evaluation as needed.  Monitor systolic blood pressure.  For mild cognitive impairment supportive treatment   For history of Parkinson's disease, the patient appears to be stable, currently on no medications.  For episodes of dizziness, monitor systolic blood pressure.  physical therapy   as tolerated      Attempting to speak with the spouse, Keyla, at [phone]. 11/25  She appears to have some underlying mild cognitive impairment as well.    Greater than 34 minutes of time was spent with the patient, plan of care, reviewing data, speaking to the multidisciplinary healthcare team with greater than 50% of that time in counseling and care coordination.    Thank you for the courtesy of this consultation.

## 2021-11-27 NOTE — PROGRESS NOTE ADULT - SUBJECTIVE AND OBJECTIVE BOX
Patient is a 89y old  Male who presents with a chief complaint of Sigmoid Colitis (25 Nov 2021 10:57)       HPI:  89 yo M w/ PMHx/ Parkinson's Disease (on no medications), CAD (s/p stent), Bladder cancer (s/p tumor resection) presented to the ED after a fall. Patient was sitting on a rolling chair and fell over. Patient states that he remembers the fall - no dizziness or prodromal symptoms. States that he lost his balance and fell backwards. However, wife at bedside states that she went to go see him and found him on the floor which is when she called 911 and was brought the the ED. Patient does not recall if he hit his head, or LOC but states that after he felt dizziness. Patient's CT abdomen showed sigmoid colitis however patient not complaining of N/V/D but came in with a temperature of 103. Patient denies dizziness at this point, CP or SOB   Of note, Patient lives with wife, and independent at baseline. Patient is not vaccinated for COVID-19 or influenza.  Patient is poor historian.  Denies SOB/N/V.  States he is urinating. Unsure if he has seen nephrologist in the past.    No new complaints; fatigued    PAST MEDICAL & SURGICAL HISTORY:  Shoulder fracture    Bladder cancer    Stented coronary artery  over 15 years ago as per patient    Parkinsons    S/P cardiac catheterization         FAMILY HISTORY:  FH: HTN (hypertension)    NC    Social History:Non smoker    MEDICATIONS  (STANDING):  cefTRIAXone   IVPB 1000 milliGRAM(s) IV Intermittent every 24 hours  heparin   Injectable 5000 Unit(s) SubCutaneous every 12 hours  lactated ringers. 1000 milliLiter(s) (75 mL/Hr) IV Continuous <Continuous>  metroNIDAZOLE  IVPB 500 milliGRAM(s) IV Intermittent every 8 hours  potassium phosphate / sodium phosphate Powder (PHOS-NaK) 1 Packet(s) Oral every 6 hours  saccharomyces boulardii 250 milliGRAM(s) Oral two times a day    MEDICATIONS  (PRN):  acetaminophen     Tablet .. 650 milliGRAM(s) Oral every 6 hours PRN Temp greater or equal to 38C (100.4F), Mild Pain (1 - 3)  melatonin 3 milliGRAM(s) Oral at bedtime PRN Insomnia   Meds reviewed    Allergies    clindamycin (Unknown)  Levaquin (Unknown)    Intolerances         REVIEW OF SYSTEMS:    Constitutional: +fatigue  HEENT: Denies headaches and dizziness  Respiratory: denies SOB, cough, or wheezing  Cardiovascular: denies CP, palpitations  Gastrointestinal: Denies nausea, denies vomiting, diarrhea, constipation, abdominal pain, or bloody stools  Genitourinary: denies painful urination, increased frequency, urgency, or bloody urine  Skin: denies rashes or itching  Musculoskeletal: denies muscle aches, joint swelling  Neurologic: Denies generalized weakness, denies loss of sensation, numbness, or tingling      Vital Signs Last 24 Hrs  T(C): 37.1 (27 Nov 2021 04:12), Max: 37.1 (27 Nov 2021 04:12)  T(F): 98.8 (27 Nov 2021 04:12), Max: 98.8 (27 Nov 2021 04:12)  HR: 81 (27 Nov 2021 04:12) (81 - 83)  BP: 120/67 (27 Nov 2021 04:12) (103/56 - 120/67)  BP(mean): --  RR: 18 (27 Nov 2021 04:12) (18 - 18)  SpO2: 95% (27 Nov 2021 04:12) (93% - 98%)    PHYSICAL EXAM:    GENERAL: NAD  HEAD:  Atraumatic, Normocephalic  EYES: EOMI, conjunctiva and sclera clear, [place]  ENMT: No Drainage from nares, No drainage from ears  NECK: Supple, neck  veins full  NERVOUS SYSTEM:  Awake and Alert  CHEST/LUNG: Clear to percussion bilaterally; No rales, rhonchi, wheezing, or rubs  HEART: Regular rate and rhythm; No murmurs, rubs, or gallops  ABDOMEN: Soft, Nontender, Nondistended; Bowel sounds present  EXTREMITIES:  No Edema  SKIN: No rashes No obvious ecchymosis      LABS:                        10.5   10.72 )-----------( 230      ( 27 Nov 2021 07:30 )             32.1     11-27    140  |  109<H>  |  22  ----------------------------<  104<H>  3.1<L>   |  23  |  1.80<H>    Ca    8.6      27 Nov 2021 07:30  Phos  1.5     11-27  Mg     2.3     11-26

## 2021-11-27 NOTE — PROGRESS NOTE ADULT - PROBLEM SELECTOR PLAN 8
VTE ppx: HSQ  - fall/aspiration precautions  - GOC discussed with patient: States that he has not thought of this before but had been meaning to discuss with his family. Would like to make a family decision. Full Code for now.

## 2021-11-27 NOTE — PROGRESS NOTE ADULT - ASSESSMENT
89 yo M w/ PMHx of Parkinson's Disease (on no medications), CAD (s/p stent), Bladder cancer (s/p tumor resection) presented to the ED after a fall admitted for work-up of fall and treatment for sigmoid colitis and SMITH. Cardiology consulted for arrhythmia.     Arrhythmia   - EKG on admission with NSR, RBBB  - Repeat EKG today with NSR with RBBB  - Tele strip reviewed, p waves noted in all strips likely SR with SA  - Pt does not have evidence of afib from tele strip or EKG  - BP and HR within normal limits    - D-Ddimer elevated with neg dopplers  - v/q: Very low probability of PE  - F/u echo    - No signs of significant ischemia or volume overload.     - cont rx for colitis  - K 3.1 today, Monitor and replete lytes, keep K>4, Mg>2.    - All other medical needs as per primary team.  - Other cardiovascular workup will depend on clinical course.  - Will continue to follow.    Avani Rubio, MS ANP, AGACNP  Nurse Practitioner- Cardiology   Spectra #[phone]

## 2021-11-27 NOTE — PROGRESS NOTE ADULT - SUBJECTIVE AND OBJECTIVE BOX
[facility]  INFECTIOUS DISEASES   [address] [place] [address]  Tel: [phone]     Fax: [phone]  ======================================================  MD Ioana Villanueva Kaushal, MD Cho, Michelle, MD   ======================================================    [id number]  [facility]     Follow up; Sigmoid Colitis     Patient seen and chart reviewed.   No more fever in last 48hrs.   Cultures negative.     PAST MEDICAL & SURGICAL HISTORY:  Shoulder fracture  Bladder cancer  Stented coronary artery  over 15 years ago as per patient  Parkinsons  S/P cardiac catheterization    Social Hx: no smoking, ETOH or drugs     FAMILY HISTORY:  FH: HTN (hypertension)    Allergies  clindamycin (Unknown)  Levaquin (Unknown)    Antibiotics:  cefTRIAXone   IVPB 1000 milliGRAM(s) IV Intermittent every 24 hours  metroNIDAZOLE  IVPB 500 milliGRAM(s) IV Intermittent every 8 hours     REVIEW OF SYSTEMS:  CONSTITUTIONAL:  No Fever or chills, weakness   HEENT:  No diplopia or blurred vision.  No sore throat or runny nose.  CARDIOVASCULAR:  No chest pain or SOB.  RESPIRATORY:  No cough, shortness of breath, PND or orthopnea.  GASTROINTESTINAL:  No nausea, vomiting or diarrhea. + abdominal discomfort  GENITOURINARY:  No dysuria, frequency or urgency. No Blood in urine  MUSCULOSKELETAL:  no joint aches, no muscle pain  SKIN:  No change in skin, hair or nails.  NEUROLOGIC:  No paresthesias, fasciculations, seizures or weakness.  PSYCHIATRIC:  No disorder of thought or mood.  ENDOCRINE:  No heat or cold intolerance, polyuria or polydipsia.  HEMATOLOGICAL:  No easy bruising or bleeding.     Physical Exam:  Vital Signs Last 24 Hrs  T(C): 37.1 (27 Nov 2021 04:12), Max: 37.1 (27 Nov 2021 04:12)  T(F): 98.8 (27 Nov 2021 04:12), Max: 98.8 (27 Nov 2021 04:12)  HR: 81 (27 Nov 2021 04:12) (81 - 83)  BP: 120/67 (27 Nov 2021 04:12) (115/63 - 120/67)  BP(mean): --  RR: 18 (27 Nov 2021 04:12) (18 - 18)  SpO2: 95% (27 Nov 2021 04:12) (95% - 98%)  GEN: NAD  HEENT: normocephalic and atraumatic. EOMI. PERRL.    NECK: Supple.  No lymphadenopathy   LUNGS: Clear to auscultation.  HEART: Regular rate and rhythm   ABDOMEN: Soft and nondistended.  Positive bowel sounds. mild lower abdominal tenderness   : No CVA tenderness  EXTREMITIES: Without any cyanosis, clubbing, rash, lesions or edema.  NEUROLOGIC: grossly intact.  PSYCHIATRIC: Appropriate affect .  SKIN: No rash     Labs:                        10.5   10.72 )-----------( 230      ( 27 Nov 2021 07:30 )             32.1     11-27    140  |  109<H>  |  22  ----------------------------<  104<H>  3.1<L>   |  23  |  1.80<H>    Ca    8.6      27 Nov 2021 07:30  Phos  1.5     11-27  Mg     2.3     11-26    Culture - Blood (collected 11-25-21 @ 01:15)  Source: .Blood Blood-Peripheral    Culture - Blood (collected 11-25-21 @ 01:15)  Source: .Blood Blood-Peripheral    Culture - Urine (collected 11-25-21 @ 00:53)  Source: Clean Catch Clean Catch (Midstream)  Final Report (11-26-21 @ 13:15):    <10,000 CFU/mL Normal Urogenital Dalila    WBC Count: 10.72 K/uL (11-27-21 @ 07:30)  WBC Count: 10.93 K/uL (11-26-21 @ 07:46)  WBC Count: 10.36 K/uL (11-25-21 @ 05:13)  WBC Count: 11.48 K/uL (11-24-21 @ 22:27)    Creatinine, Serum: 1.80 mg/dL (11-27-21 @ 07:30)  Creatinine, Serum: 1.90 mg/dL (11-26-21 @ 07:46)  Creatinine, Serum: 1.90 mg/dL (11-25-21 @ 05:13)  Creatinine, Serum: 2.20 mg/dL (11-24-21 @ 22:27)    Ferritin, Serum: 334 ng/mL (11-26-21 @ 12:25)    COVID-19 PCR: NotDetec (11-24-21 @ 22:27)    All imaging and other data have been reviewed.  < from: CT Renal Stone Hunt (11.24.21 @ 23:16) >  IMPRESSION:  1. No nephrolithiasis, hydronephrosis or obstructive uropathy.  2. Mid to distal sigmoid colitis which is infectious, inflammatory or ischemic in etiology.    Assessment and Plan:   89 yo man with PMH of CAD, Parkinson's Disease and Bladder cancer s/p tumor resection was admitted after a fall. No LOC. He denies any symptoms except for weakness and mild lower abdominal pain. No nausea, vomiting, diarrhea or dysuria. Had some work up done, showed possible left sided colitis. Fall could be related to infectious process, since he had fever and colitis, will need to be ruled out for bacteremia.   UA negative  Tmax 103.1  Mild leukocytosis 10.4  CT with mid to distal sigmoid colitis     1- Fever and colitis  - Blood culture x 2 NGTD  - UA and UC negative   - On full liquid diet   - Continue Ceftriaxone and metronidazole due to PCN allergy, no reaction   - Trend Creat to adjust ABx doses, renal on board   - Possible switch to po ABx, if tolerates regular diet.   - If remains stable, can complete treatment for total of 7days     2- Fall and weakness   - Neurology consult noted   - Head CT noted  - Had a neg VQ scan and doppler     Will follow PRN.    Mario Anderson MD  Division of Infectious Diseases   Cell [phone] between 8am and 6pm   After 6pm and weekends please call ID service at [phone].      [facility]  INFECTIOUS DISEASES   [address] [place] [address]  Tel: [phone]     Fax: [phone]  ======================================================  MD Ioana Villanueva Kaushal, MD Cho, Michelle, MD   ======================================================    N-[id number]  [facility]     Follow up; Sigmoid Colitis     Patient seen and chart reviewed.   No more fever in last 48hrs.   Cultures negative.     PAST MEDICAL & SURGICAL HISTORY:  Shoulder fracture  Bladder cancer  Stented coronary artery  over 15 years ago as per patient  Parkinsons  S/P cardiac catheterization    Social Hx: no smoking, ETOH or drugs     FAMILY HISTORY:  FH: HTN (hypertension)    Allergies  clindamycin (Unknown)  Levaquin (Unknown)    Antibiotics:  cefTRIAXone   IVPB 1000 milliGRAM(s) IV Intermittent every 24 hours  metroNIDAZOLE  IVPB 500 milliGRAM(s) IV Intermittent every 8 hours     REVIEW OF SYSTEMS:  CONSTITUTIONAL:  No Fever or chills, weakness   HEENT:  No diplopia or blurred vision.  No sore throat or runny nose.  CARDIOVASCULAR:  No chest pain or SOB.  RESPIRATORY:  No cough, shortness of breath, PND or orthopnea.  GASTROINTESTINAL:  No nausea, vomiting or diarrhea. + abdominal discomfort  GENITOURINARY:  No dysuria, frequency or urgency. No Blood in urine  MUSCULOSKELETAL:  no joint aches, no muscle pain  SKIN:  No change in skin, hair or nails.  NEUROLOGIC:  No paresthesias, fasciculations, seizures or weakness.  PSYCHIATRIC:  No disorder of thought or mood.  ENDOCRINE:  No heat or cold intolerance, polyuria or polydipsia.  HEMATOLOGICAL:  No easy bruising or bleeding.     Physical Exam:  Vital Signs Last 24 Hrs  T(C): 37.1 (27 Nov 2021 04:12), Max: 37.1 (27 Nov 2021 04:12)  T(F): 98.8 (27 Nov 2021 04:12), Max: 98.8 (27 Nov 2021 04:12)  HR: 81 (27 Nov 2021 04:12) (81 - 83)  BP: 120/67 (27 Nov 2021 04:12) (115/63 - 120/67)  BP(mean): --  RR: 18 (27 Nov 2021 04:12) (18 - 18)  SpO2: 95% (27 Nov 2021 04:12) (95% - 98%)  GEN: NAD  HEENT: normocephalic and atraumatic. EOMI. PERRL.    NECK: Supple.  No lymphadenopathy   LUNGS: Clear to auscultation.  HEART: Regular rate and rhythm   ABDOMEN: Soft and nondistended.  Positive bowel sounds. mild lower abdominal tenderness   : No CVA tenderness  EXTREMITIES: Without any cyanosis, clubbing, rash, lesions or edema.  NEUROLOGIC: grossly intact.  PSYCHIATRIC: Appropriate affect .  SKIN: No rash     Labs:                        10.5   10.72 )-----------( 230      ( 27 Nov 2021 07:30 )             32.1     11-27    140  |  109<H>  |  22  ----------------------------<  104<H>  3.1<L>   |  23  |  1.80<H>    Ca    8.6      27 Nov 2021 07:30  Phos  1.5     11-27  Mg     2.3     11-26    Culture - Blood (collected 11-25-21 @ 01:15)  Source: .Blood Blood-Peripheral    Culture - Blood (collected 11-25-21 @ 01:15)  Source: .Blood Blood-Peripheral    Culture - Urine (collected 11-25-21 @ 00:53)  Source: Clean Catch Clean Catch (Midstream)  Final Report (11-26-21 @ 13:15):    <10,000 CFU/mL Normal Urogenital Dalila    WBC Count: 10.72 K/uL (11-27-21 @ 07:30)  WBC Count: 10.93 K/uL (11-26-21 @ 07:46)  WBC Count: 10.36 K/uL (11-25-21 @ 05:13)  WBC Count: 11.48 K/uL (11-24-21 @ 22:27)    Creatinine, Serum: 1.80 mg/dL (11-27-21 @ 07:30)  Creatinine, Serum: 1.90 mg/dL (11-26-21 @ 07:46)  Creatinine, Serum: 1.90 mg/dL (11-25-21 @ 05:13)  Creatinine, Serum: 2.20 mg/dL (11-24-21 @ 22:27)    Ferritin, Serum: 334 ng/mL (11-26-21 @ 12:25)    COVID-19 PCR: NotDetec (11-24-21 @ 22:27)    All imaging and other data have been reviewed.  < from: CT Renal Stone Hunt (11.24.21 @ 23:16) >  IMPRESSION:  1. No nephrolithiasis, hydronephrosis or obstructive uropathy.  2. Mid to distal sigmoid colitis which is infectious, inflammatory or ischemic in etiology.    Assessment and Plan:   89 yo man with PMH of CAD, Parkinson's Disease and Bladder cancer s/p tumor resection was admitted after a fall. No LOC. He denies any symptoms except for weakness and mild lower abdominal pain. No nausea, vomiting, diarrhea or dysuria. Had some work up done, showed possible left sided colitis. Fall could be related to infectious process, since he had fever and colitis, will need to be ruled out for bacteremia.   UA negative  Tmax 103.1  Mild leukocytosis 10.4  CT with mid to distal sigmoid colitis   GI PCR with Cryptosporidium     1- Fever and colitis  - Blood culture x 2 NGTD  - UA and UC negative   - On full liquid diet   - Stop Ceftriaxone and metronidazole  - Cryptosporidium in stool can cause diarrhea, fever and colitis seen in CT     2- Fall and weakness   - Neurology consult noted   - Head CT noted  - Had a neg VQ scan and doppler     Will follow PRN.  D/W Dr. Smith.    Mario Anderson MD  Division of Infectious Diseases   Cell [phone] between 8am and 6pm   After 6pm and weekends please call ID service at [phone].

## 2021-11-27 NOTE — PROGRESS NOTE ADULT - PROBLEM SELECTOR PLAN 4
SMITH vs CKD  - Patient has no known history of kidney disease, but also with very poor outpt f/up and is a poor historian. Unknown creatinine/BUN baseline. On admission it was 2.20/34. trending down  - US of kidney: technically limited, R renal pelviectasis, large b/l renal cysts w/ complex appearing cyst R kidney  - Monitor CMP daily   - nephrology following, Dr. Freeman, recs appreciated

## 2021-11-27 NOTE — PROGRESS NOTE ADULT - SUBJECTIVE AND OBJECTIVE BOX
Patient is a 89y old  Male who presents with a chief complaint of Sigmoid Colitis (27 Nov 2021 12:54)      INTERVAL HPI/OVERNIGHT EVENTS: Patient seen and examined at bedside. No overnight events occurred. Patient endorses that his stools feel more formed as compared to yesterday as confirmed with RN. No melena/hematochezia. Patient is urinating well without dysuria, changes in urinary frequency or hematuria/pyuria. Patient is eating well w/ liquid diet denying dysphagia or cough while eating and denies significant abdominal pain. Patient has no complaints at this time. Denies fevers, chills, headache, lightheadedness, chest pain, dyspnea, nausea or emesis.    Tele - Sinus 70's-90's w/ PAC's and bundle    MEDICATIONS  (STANDING):  aspirin  chewable 81 milliGRAM(s) Oral daily  cefTRIAXone   IVPB 1000 milliGRAM(s) IV Intermittent every 24 hours  heparin   Injectable 5000 Unit(s) SubCutaneous every 8 hours  metroNIDAZOLE  IVPB 500 milliGRAM(s) IV Intermittent every 8 hours  saccharomyces boulardii 250 milliGRAM(s) Oral two times a day    MEDICATIONS  (PRN):  acetaminophen     Tablet .. 650 milliGRAM(s) Oral every 6 hours PRN Temp greater or equal to 38C (100.4F), Mild Pain (1 - 3)  melatonin 3 milliGRAM(s) Oral at bedtime PRN Insomnia      Allergies    clindamycin (Unknown)  Levaquin (Unknown)    Intolerances        REVIEW OF SYSTEMS:  CONSTITUTIONAL: No fever or chills  HEENT:  No headache, no sore throat  RESPIRATORY: No cough, wheezing, or shortness of breath  CARDIOVASCULAR: No chest pain, palpitations  GASTROINTESTINAL: No abd pain, nausea, vomiting, or diarrhea  GENITOURINARY: No dysuria, frequency, or hematuria  NEUROLOGICAL: no focal weakness or dizziness  MUSCULOSKELETAL: no myalgias     Vital Signs Last 24 Hrs  T(C): 37.1 (27 Nov 2021 04:12), Max: 37.1 (27 Nov 2021 04:12)  T(F): 98.8 (27 Nov 2021 04:12), Max: 98.8 (27 Nov 2021 04:12)  HR: 81 (27 Nov 2021 04:12) (81 - 83)  BP: 120/67 (27 Nov 2021 04:12) (115/63 - 120/67)  BP(mean): --  RR: 18 (27 Nov 2021 04:12) (18 - 18)  SpO2: 95% (27 Nov 2021 04:12) (95% - 98%)    PHYSICAL EXAM:  GENERAL: NAD  HEENT:  anicteric, moist mucous membranes  CHEST/LUNG:  CTA b/l, no rales, wheezes, or rhonchi  HEART:  RRR, S1, S2  ABDOMEN:  BS+, soft, nontender, nondistended  EXTREMITIES: no edema, cyanosis, or calf tenderness  NERVOUS SYSTEM: answers questions and follows commands appropriately    LABS:                        10.5   10.72 )-----------( 230      ( 27 Nov 2021 07:30 )             32.1     CBC Full  -  ( 27 Nov 2021 07:30 )  WBC Count : 10.72 K/uL  Hemoglobin : 10.5 g/dL  Hematocrit : 32.1 %  Platelet Count - Automated : 230 K/uL  Mean Cell Volume : 95.5 fl  Mean Cell Hemoglobin : 31.3 pg  Mean Cell Hemoglobin Concentration : 32.7 gm/dL  Auto Neutrophil # : 7.57 K/uL  Auto Lymphocyte # : 0.63 K/uL  Auto Monocyte # : 1.58 K/uL  Auto Eosinophil # : 0.31 K/uL  Auto Basophil # : 0.03 K/uL  Auto Neutrophil % : 70.6 %  Auto Lymphocyte % : 5.9 %  Auto Monocyte % : 14.7 %  Auto Eosinophil % : 2.9 %  Auto Basophil % : 0.3 %    27 Nov 2021 07:30    140    |  109    |  22     ----------------------------<  104    3.1     |  23     |  1.80     Ca    8.6        27 Nov 2021 07:30  Phos  1.5       27 Nov 2021 07:30          CAPILLARY BLOOD GLUCOSE            Culture - Blood (collected 11-25-21 @ 01:15)  Source: .Blood Blood-Peripheral  Preliminary Report (11-26-21 @ 02:02):    No growth to date.    Culture - Blood (collected 11-25-21 @ 01:15)  Source: .Blood Blood-Peripheral  Preliminary Report (11-26-21 @ 02:02):    No growth to date.    Culture - Urine (collected 11-25-21 @ 00:53)  Source: Clean Catch Clean Catch (Midstream)  Final Report (11-26-21 @ 13:15):    <10,000 CFU/mL Normal Urogenital Dalila        RADIOLOGY & ADDITIONAL TESTS:  < from: NM Pulmonary Ventilation/Perfusion Scan (11.26.21 @ 16:07) >  Very low probability of pulmonary embolus.    < end of copied text >    Personally reviewed.     Consultant(s) Notes Reviewed:  [x] YES  [ ] NO     Patient is a 89y old  Male who presents with a chief complaint of fall.      INTERVAL HPI/OVERNIGHT EVENTS: Patient seen and examined at bedside. No overnight events occurred. Patient endorses that his stools feel more formed as compared to yesterday as confirmed with RN. No melena/hematochezia. Patient is urinating well without dysuria, changes in urinary frequency or hematuria/pyuria. Patient is eating well w/ liquid diet denying dysphagia or cough while eating but still with abdominal discomfort. Denies significant abdominal pain. Patient has no other complaints at this time. Denies fevers, chills, headache, lightheadedness, chest pain, dyspnea, nausea or emesis.    Tele - Sinus 70's-90's w/ PAC's and RBBB    MEDICATIONS  (STANDING):  aspirin  chewable 81 milliGRAM(s) Oral daily  cefTRIAXone   IVPB 1000 milliGRAM(s) IV Intermittent every 24 hours  heparin   Injectable 5000 Unit(s) SubCutaneous every 8 hours  metroNIDAZOLE  IVPB 500 milliGRAM(s) IV Intermittent every 8 hours  saccharomyces boulardii 250 milliGRAM(s) Oral two times a day    MEDICATIONS  (PRN):  acetaminophen     Tablet .. 650 milliGRAM(s) Oral every 6 hours PRN Temp greater or equal to 38C (100.4F), Mild Pain (1 - 3)  melatonin 3 milliGRAM(s) Oral at bedtime PRN Insomnia      Allergies    clindamycin (Unknown)  Levaquin (Unknown)    Intolerances        REVIEW OF SYSTEMS:  CONSTITUTIONAL: No fever or chills  HEENT:  No headache, no sore throat  RESPIRATORY: No cough, wheezing, or shortness of breath  CARDIOVASCULAR: No chest pain, palpitations  GASTROINTESTINAL: +abd discomfort ; diarrhea improving; no nausea or vomiting  GENITOURINARY: No dysuria, frequency, or hematuria  NEUROLOGICAL: no focal weakness or dizziness  MUSCULOSKELETAL: no myalgias     Vital Signs Last 24 Hrs  T(C): 37.1 (27 Nov 2021 04:12), Max: 37.1 (27 Nov 2021 04:12)  T(F): 98.8 (27 Nov 2021 04:12), Max: 98.8 (27 Nov 2021 04:12)  HR: 81 (27 Nov 2021 04:12) (81 - 83)  BP: 120/67 (27 Nov 2021 04:12) (115/63 - 120/67)  BP(mean): --  RR: 18 (27 Nov 2021 04:12) (18 - 18)  SpO2: 95% (27 Nov 2021 04:12) (95% - 98%)    PHYSICAL EXAM:  GENERAL: NAD at rest  HEENT:  anicteric, moist mucous membranes  CHEST/LUNG:  CTA b/l, no rales, wheezes, or rhonchi  HEART:  RRR, S1, S2  ABDOMEN:  BS+, soft, tenderness worst in LLQ, no guarding, nondistended  EXTREMITIES: no edema, cyanosis, or calf tenderness  NERVOUS SYSTEM: answers questions and follows commands appropriately    LABS:                        10.5   10.72 )-----------( 230      ( 27 Nov 2021 07:30 )             32.1     CBC Full  -  ( 27 Nov 2021 07:30 )  WBC Count : 10.72 K/uL  Hemoglobin : 10.5 g/dL  Hematocrit : 32.1 %  Platelet Count - Automated : 230 K/uL  Mean Cell Volume : 95.5 fl  Mean Cell Hemoglobin : 31.3 pg  Mean Cell Hemoglobin Concentration : 32.7 gm/dL  Auto Neutrophil # : 7.57 K/uL  Auto Lymphocyte # : 0.63 K/uL  Auto Monocyte # : 1.58 K/uL  Auto Eosinophil # : 0.31 K/uL  Auto Basophil # : 0.03 K/uL  Auto Neutrophil % : 70.6 %  Auto Lymphocyte % : 5.9 %  Auto Monocyte % : 14.7 %  Auto Eosinophil % : 2.9 %  Auto Basophil % : 0.3 %    27 Nov 2021 07:30    140    |  109    |  22     ----------------------------<  104    3.1     |  23     |  1.80     Ca    8.6        27 Nov 2021 07:30  Phos  1.5       27 Nov 2021 07:30          CAPILLARY BLOOD GLUCOSE            Culture - Blood (collected 11-25-21 @ 01:15)  Source: .Blood Blood-Peripheral  Preliminary Report (11-26-21 @ 02:02):    No growth to date.    Culture - Blood (collected 11-25-21 @ 01:15)  Source: .Blood Blood-Peripheral  Preliminary Report (11-26-21 @ 02:02):    No growth to date.    Culture - Urine (collected 11-25-21 @ 00:53)  Source: Clean Catch Clean Catch (Midstream)  Final Report (11-26-21 @ 13:15):    <10,000 CFU/mL Normal Urogenital Dalila        RADIOLOGY & ADDITIONAL TESTS:  < from: NM Pulmonary Ventilation/Perfusion Scan (11.26.21 @ 16:07) >  Very low probability of pulmonary embolus.    < end of copied text >    Personally reviewed.     Consultant(s) Notes Reviewed:  [x] YES  [ ] NO

## 2021-11-27 NOTE — PROGRESS NOTE ADULT - PROBLEM SELECTOR PLAN 2
- Patient came in after a fall from chair. Patient states that he lost balance and fell backward but does not remember details of what happened after. Mechanical fall vs. syncope.   - CT head showed no acute pathology - lateral ventricles dilated out of proportion to the sulcal prominence which could be due to central atrophy versus normal pressure hydrocephalus, but could also be due to atrophy  - Continue to monitor   - Orthostatic Vital signs  - Fall precautious   - Neurology Consulted Dr. Rudolph, recs appreciated   - PT evaluation - rec ALEXIA

## 2021-11-27 NOTE — PROGRESS NOTE ADULT - PROBLEM SELECTOR PLAN 3
- Overnight on tele 11/26, the tele tech read strips as patient having possible alternating afib and NSR @80's-90's.  - cardio reviewed strips and there was no afib detected, just sinus arrhythmia  - EKG STAT -> NSR w/ t-wave inversions in V4-V6, RBBB, S1Q3T3 pattern  - d-dimer elevated: 764  - US LE dopplers to r/o DVT -> negative for DVT  - VQ scan urgent to r/o PE -- very low probability for PE  - f/up TTE  - c/w tele monitoring  - Cardiology consulted, Dr. Gomez, recs appreciated

## 2021-11-27 NOTE — PROGRESS NOTE ADULT - SUBJECTIVE AND OBJECTIVE BOX
[facility] -- Britany Stark, Diana, Raoul, Jason Puckett, Sharyn Aguilar: Office # [phone]    Follow Up:  Abnormal EKG     Subjective/Observations: Patient seen and examined. Patient awake, alert, resting comfortably in bed. No complaints of chest pain, dyspnea, palpitations or dizziness. Tolerating O2 via nasal cannula.     REVIEW OF SYSTEMS: All review of systems is negative for eye, ENT, GI, , allergic, dermatologic, musculoskeletal and neurologic except as described above    PAST MEDICAL & SURGICAL HISTORY:  Shoulder fracture    Bladder cancer    Stented coronary artery  over 15 years ago as per patient    Parkinsons    S/P cardiac catheterization        MEDICATIONS  (STANDING):  aspirin  chewable 81 milliGRAM(s) Oral daily  cefTRIAXone   IVPB 1000 milliGRAM(s) IV Intermittent every 24 hours  heparin   Injectable 5000 Unit(s) SubCutaneous every 8 hours  metroNIDAZOLE  IVPB 500 milliGRAM(s) IV Intermittent every 8 hours  saccharomyces boulardii 250 milliGRAM(s) Oral two times a day    MEDICATIONS  (PRN):  acetaminophen     Tablet .. 650 milliGRAM(s) Oral every 6 hours PRN Temp greater or equal to 38C (100.4F), Mild Pain (1 - 3)  melatonin 3 milliGRAM(s) Oral at bedtime PRN Insomnia    Allergies    clindamycin (Unknown)  Levaquin (Unknown)    Intolerances      Vital Signs Last 24 Hrs  T(C): 37.1 (27 Nov 2021 04:12), Max: 37.1 (27 Nov 2021 04:12)  T(F): 98.8 (27 Nov 2021 04:12), Max: 98.8 (27 Nov 2021 04:12)  HR: 81 (27 Nov 2021 04:12) (81 - 83)  BP: 120/67 (27 Nov 2021 04:12) (103/56 - 120/67)  BP(mean): --  RR: 18 (27 Nov 2021 04:12) (18 - 18)  SpO2: 95% (27 Nov 2021 04:12) (93% - 98%)  I&O's Summary    26 Nov 2021 07:01  -  27 Nov 2021 07:00  --------------------------------------------------------  IN: 0 mL / OUT: 2053 mL / NET: -2053 mL          TELE:  78 PACs   PHYSICAL EXAM:  Appearance: NAD, no distress, alert, Well developed   HEENT: Moist Mucous Membranes, Anicteric  Cardiovascular: Regular rate and rhythm, Normal S1 S2, No JVD, + murmur, No rubs, gallops or clicks  Respiratory: Non-labored, Clear to auscultation, No rales, No rhonchi, No wheezing.   Gastrointestinal:  Soft, Non-tender, + BS  Skin: Warm and dry, No visible rashes or ulcers, No ecchymosis, No cyanosis  Musculoskeletal: No clubbing, No cyanosis, No joint swelling/tenderness  Psychiatry: Mood & affect appropriate  Lymph: No peripheral edema.     LABS: All Labs Reviewed:                        10.5   10.72 )-----------( 230      ( 27 Nov 2021 07:30 )             32.1                         10.6   10.93 )-----------( 216      ( 26 Nov 2021 07:46 )             32.1                         10.6   10.36 )-----------( 191      ( 25 Nov 2021 05:13 )             32.0     27 Nov 2021 07:30    140    |  109    |  22     ----------------------------<  104    3.1     |  23     |  1.80   26 Nov 2021 07:46    141    |  109    |  25     ----------------------------<  89     3.5     |  24     |  1.90   25 Nov 2021 05:13    138    |  108    |  30     ----------------------------<  100    3.6     |  26     |  1.90     Ca    8.6        27 Nov 2021 07:30  Ca    9.2        26 Nov 2021 07:46  Ca    9.1        25 Nov 2021 05:13  Phos  1.5       27 Nov 2021 07:30  Phos  2.2       26 Nov 2021 07:46  Phos  2.1       25 Nov 2021 05:13  Mg     2.3       26 Nov 2021 07:46  Mg     2.3       25 Nov 2021 05:13    TPro  7.0    /  Alb  3.2    /  TBili  0.4    /  DBili  x      /  AST  27     /  ALT  18     /  AlkPhos  43     25 Nov 2021 05:13  TPro  8.3    /  Alb  3.6    /  TBili  0.5    /  DBili  x      /  AST  32     /  ALT  21     /  AlkPhos  50     24 Nov 2021 22:27      Creatine Kinase, Serum: 224 U/L (11-25-21 @ 05:13)  Troponin I, High Sensitivity Result: 8.1 ng/L (11-24-21 @ 22:27)  Creatine Kinase, Serum: 185 U/L (11-24-21 @ 22:27)      D-Dimer Assay, Quantitative: 764 ng/mL DDU (11-26-21 @ 11:41)  Lactate, Blood: 1.2 mmol/L (11-24-21 @ 22:27)    12 Lead ECG:   Ventricular Rate 80 BPM  Atrial Rate 80 BPM  P-R Interval 168 ms  QRS Duration 140 ms  Q-T Interval 416 ms  QTC Calculation(Bazett) 479 ms  P Axis 11 degrees  R Axis -27 degrees  T Axis -6 degrees  Diagnosis Line Normal sinus rhythm with sinus arrhythmia  Right bundle branch block  Inferior infarct (cited on or before 24-NOV-2021)  Abnormal ECG  When compared with ECG of 24-NOV-2021 22:14,  No significant change was found  Confirmed by STEPHEN VERA ([id number]) on 11/26/2021 6:11:40 PM (11-26-21 @ 10:56)    < from: NM Pulmonary Ventilation/Perfusion Scan (11.26.21 @ 16:07) >  EXAM:  NM PULM VENTILATION PERFUS IMG                        PROCEDURE DATE:  11/26/2021    INTERPRETATION:  RADIOPHARMACEUTICAL: 1 mCi Tc-99m-DTPA aerosol by inhalation; 6 mCi Tc-99m-MAA, I.V.  CLINICAL STATEMENT: 89-year-old male withhistory of syncope and elevated d-dimer.  TECHNIQUE:  Ventilation and perfusion images of the lungs were obtained following administration of Tc-99m-DTPA and Tc-99m-MAA. Images were obtained in the anterior, posterior, both lateral, and all 4 oblique projections. The study was interpreted in conjunction with chest radiograph of 11/26/2021.  No prior VQ scan.  FINDINGS: There is heterogeneous tracer distribution in the lungs, on both the ventilation and the perfusion images. There are no discrete segmental perfusion defects.    IMPRESSION:  Very low probability of pulmonary embolus.  --- End of Report ---  < end of copied text >      < from: US Duplex Venous Lower Ext Complete, Bilateral (11.26.21 @ 12:32) >  EXAM:  US DPLX LWR EXT VEINS COMPL BI                        PROCEDURE DATE:  11/26/2021    INTERPRETATION:  CLINICAL INFORMATION: Atrial fibrillation. Admitted with syncope. Evaluate for DVT.  COMPARISON: None available.  TECHNIQUE: Duplex sonography of the BILATERAL LOWER extremity veins with color and spectral Doppler, with and without compression.  FINDINGS:  RIGHT:  Normal compressibility of the RIGHT common femoral, femoral and popliteal veins.  Doppler examination showsnormal spontaneous and phasic flow.  No RIGHT calf vein thrombosis is detected.  LEFT:  Normal compressibility of the LEFT common femoral, femoral and popliteal veins.  Doppler examination shows normal spontaneous and phasic flow.  No LEFT calf vein thrombosis is detected.    IMPRESSION:  No evidence of deep venous thrombosis in either lower extremity.  < end of copied text >    < from: CT Head No Cont (11.24.21 @ 23:16) >  IMPRESSION:  No acute intracranial hemorrhage, territorial infarct, mass effect or calvarial fracture.  --- End of Report ---  < end of copied text >

## 2021-11-27 NOTE — PROGRESS NOTE ADULT - ASSESSMENT
SMITH on Likely CKD  HTN  Anemia  Colitis  Syncope  Renal cyst    -Unknown baseline creatinine  -Urine lytes reviewed  -UA small ketone, 100 protein  -UPC 1.4  -Renal indices were improving now stabilized; baseline? will continue to monitor trend  -Held ACE/ARB/diuretic  -Renal sono no hydro; complex cyst noted; consider Urology evaluation, can be done outpatient   -Can avoid additional IVF  -BP stable  -Can restart Diuretics as needed given mild hypoxia  -KCl    Thank you

## 2021-11-27 NOTE — PROGRESS NOTE ADULT - PROBLEM SELECTOR PLAN 1
- met sepsis criteria on admit, likely due to colitis, improving  - CT scan -> Mid to distal sigmoid colitis which is infectious, inflammatory or ischemic in etiology.  - c/w Rocephin & Flagyl for now, can consider PO if patient can tolerate diet per ID  - f/up GI PCR, diarrhea improving per RN, unlikely C Diff at this time  - c/w BCx x 2 - NGTD  - Tylenol PRN for fever   - Infectious Disease consulted (Dr. Anderson); recs appreciated - met sepsis criteria on admit, likely due to colitis, improving  - CT scan -> Mid to distal sigmoid colitis which is infectious, inflammatory or ischemic in etiology.  - has been on Rocephin & Flagyl  - GI PCR came back this afternoon as positive for cryptosporidium species  - diarrhea improving per RN and pt  - discussed with ID (Dr. Anderson); recs appreciated -- rec to d/c Abx and monitor off Abx as that type of infectious diarrhea is typically self-limited   - will keep full liquid diet today as pt still with some discomfort/tenderness, but consider advancing back to solids tomorrow if pt improving  - c/w BCx x 2 - NGTD

## 2021-11-27 NOTE — PROGRESS NOTE ADULT - PROBLEM SELECTOR PLAN 7
Patient asymptomatic. UA not impressive. UA trace ketones, 100 protein, moderate blood, moderate bacteria, 3-5 granular casts.   - UCx -> negative  - Tylenol PRN for fever   - Infectious Disease consulted (Dr. Anderson); recs appreciated

## 2021-11-27 NOTE — PROGRESS NOTE ADULT - PROBLEM SELECTOR PLAN 5
unknown baseline, 11.7 on admit, stable  - iron studies consistent with anemia of chronic disease  - B12, folate, wnl

## 2021-11-27 NOTE — PROGRESS NOTE ADULT - PROBLEM SELECTOR PLAN 6
- Hypophosphatemic and hypokalemic  - replete prn  - continue to monitor electrolytes in AM CMP + P  - nephrology following, Dr. Freeman, recs appreciated

## 2021-11-28 ENCOUNTER — TRANSCRIPTION ENCOUNTER (OUTPATIENT)
Age: 86
End: 2021-11-28

## 2021-11-28 DIAGNOSIS — K52.9 NONINFECTIVE GASTROENTERITIS AND COLITIS, UNSPECIFIED: ICD-10-CM

## 2021-11-28 LAB
ALBUMIN SERPL ELPH-MCNC: 2.7 G/DL — LOW (ref 3.3–5)
ALP SERPL-CCNC: 39 U/L — LOW (ref 40–120)
ALT FLD-CCNC: 17 U/L — SIGNIFICANT CHANGE UP (ref 12–78)
ANION GAP SERPL CALC-SCNC: 4 MMOL/L — LOW (ref 5–17)
AST SERPL-CCNC: 22 U/L — SIGNIFICANT CHANGE UP (ref 15–37)
BASOPHILS # BLD AUTO: 0.03 K/UL — SIGNIFICANT CHANGE UP (ref 0–0.2)
BASOPHILS NFR BLD AUTO: 0.4 % — SIGNIFICANT CHANGE UP (ref 0–2)
BILIRUB SERPL-MCNC: 0.3 MG/DL — SIGNIFICANT CHANGE UP (ref 0.2–1.2)
BUN SERPL-MCNC: 18 MG/DL — SIGNIFICANT CHANGE UP (ref 7–23)
CALCIUM SERPL-MCNC: 9.2 MG/DL — SIGNIFICANT CHANGE UP (ref 8.5–10.1)
CHLORIDE SERPL-SCNC: 112 MMOL/L — HIGH (ref 96–108)
CO2 SERPL-SCNC: 26 MMOL/L — SIGNIFICANT CHANGE UP (ref 22–31)
CREAT SERPL-MCNC: 1.8 MG/DL — HIGH (ref 0.5–1.3)
EOSINOPHIL # BLD AUTO: 0.59 K/UL — HIGH (ref 0–0.5)
EOSINOPHIL NFR BLD AUTO: 7.2 % — HIGH (ref 0–6)
GLUCOSE SERPL-MCNC: 88 MG/DL — SIGNIFICANT CHANGE UP (ref 70–99)
HCT VFR BLD CALC: 34.2 % — LOW (ref 39–50)
HGB BLD-MCNC: 10.7 G/DL — LOW (ref 13–17)
IMM GRANULOCYTES NFR BLD AUTO: 6.6 % — HIGH (ref 0–1.5)
LYMPHOCYTES # BLD AUTO: 0.91 K/UL — LOW (ref 1–3.3)
LYMPHOCYTES # BLD AUTO: 11.2 % — LOW (ref 13–44)
MAGNESIUM SERPL-MCNC: 1.9 MG/DL — SIGNIFICANT CHANGE UP (ref 1.6–2.6)
MCHC RBC-ENTMCNC: 30.3 PG — SIGNIFICANT CHANGE UP (ref 27–34)
MCHC RBC-ENTMCNC: 31.3 GM/DL — LOW (ref 32–36)
MCV RBC AUTO: 96.9 FL — SIGNIFICANT CHANGE UP (ref 80–100)
MONOCYTES # BLD AUTO: 1.41 K/UL — HIGH (ref 0–0.9)
MONOCYTES NFR BLD AUTO: 17.3 % — HIGH (ref 2–14)
NEUTROPHILS # BLD AUTO: 4.68 K/UL — SIGNIFICANT CHANGE UP (ref 1.8–7.4)
NEUTROPHILS NFR BLD AUTO: 57.3 % — SIGNIFICANT CHANGE UP (ref 43–77)
NRBC # BLD: 0 /100 WBCS — SIGNIFICANT CHANGE UP (ref 0–0)
PHOSPHATE SERPL-MCNC: 2.3 MG/DL — LOW (ref 2.5–4.5)
PLATELET # BLD AUTO: 231 K/UL — SIGNIFICANT CHANGE UP (ref 150–400)
POTASSIUM SERPL-MCNC: 3.7 MMOL/L — SIGNIFICANT CHANGE UP (ref 3.5–5.3)
POTASSIUM SERPL-SCNC: 3.7 MMOL/L — SIGNIFICANT CHANGE UP (ref 3.5–5.3)
PROT SERPL-MCNC: 6.5 G/DL — SIGNIFICANT CHANGE UP (ref 6–8.3)
RBC # BLD: 3.53 M/UL — LOW (ref 4.2–5.8)
RBC # FLD: 19.5 % — HIGH (ref 10.3–14.5)
SODIUM SERPL-SCNC: 142 MMOL/L — SIGNIFICANT CHANGE UP (ref 135–145)
WBC # BLD: 8.16 K/UL — SIGNIFICANT CHANGE UP (ref 3.8–10.5)
WBC # FLD AUTO: 8.16 K/UL — SIGNIFICANT CHANGE UP (ref 3.8–10.5)

## 2021-11-28 PROCEDURE — 99233 SBSQ HOSP IP/OBS HIGH 50: CPT | Mod: GC

## 2021-11-28 PROCEDURE — 99232 SBSQ HOSP IP/OBS MODERATE 35: CPT

## 2021-11-28 RX ORDER — SODIUM,POTASSIUM PHOSPHATES 278-250MG
2 POWDER IN PACKET (EA) ORAL
Refills: 0 | Status: COMPLETED | OUTPATIENT
Start: 2021-11-28 | End: 2021-11-29

## 2021-11-28 RX ADMIN — HEPARIN SODIUM 5000 UNIT(S): 5000 INJECTION INTRAVENOUS; SUBCUTANEOUS at 22:11

## 2021-11-28 RX ADMIN — Medication 250 MILLIGRAM(S): at 05:08

## 2021-11-28 RX ADMIN — Medication 81 MILLIGRAM(S): at 11:54

## 2021-11-28 RX ADMIN — HEPARIN SODIUM 5000 UNIT(S): 5000 INJECTION INTRAVENOUS; SUBCUTANEOUS at 05:08

## 2021-11-28 RX ADMIN — Medication 2 PACKET(S): at 17:35

## 2021-11-28 RX ADMIN — Medication 250 MILLIGRAM(S): at 17:35

## 2021-11-28 NOTE — PROGRESS NOTE ADULT - ASSESSMENT
SMITH on Likely CKD  HTN  Anemia  Colitis  Syncope  Renal cyst    -Unknown baseline creatinine  -Urine lytes reviewed  -UA small ketone, 100 protein  -UPC 1.4  -Renal indices were improving now stabilized; baseline? will continue to monitor trend  -Renal sono no hydro; complex cyst noted; consider Urology evaluation, can be done outpatient   -Can avoid additional IVF  -BP stable  -Can restart Diuretics as needed given mild hypoxia  -Phos replacement     Thank you

## 2021-11-28 NOTE — PROGRESS NOTE ADULT - ASSESSMENT
89 yo M w/ PMHx of Parkinson's Disease (on no medications), CAD (s/p stent), Bladder cancer (s/p tumor resection) presented to the ED after a fall admitted for work-up of fall and treatment for sigmoid colitis and SMITH. Cardiology consulted for arrhythmia.     Arrhythmia   - EKG on admission with NSR, RBBB, repeat unchanged  - No signs of active ischemia  - Tele strip reviewed, p waves noted in all strips likely SR with SA, no afib noted, tele now d/c'd  - BP and HR within normal limits    - D-Ddimer elevated with neg dopplers  - v/q: Very low probability of PE  - F/u echo  - No signs of significant volume overload.     - cont rx for colitis  -  Monitor and replete lytes, keep K>4, Mg>2.    - All other medical needs as per primary team.  - Other cardiovascular workup will depend on clinical course.  - Will continue to follow.    Avani Rubio, MS ANP, AGACNP  Nurse Practitioner- Cardiology   Spectra #[phone]   89 yo M w/ PMHx of Parkinson's Disease (on no medications), CAD (s/p stent), Bladder cancer (s/p tumor resection) presented to the ED after a fall admitted for work-up of fall and treatment for sigmoid colitis and SMITH. Cardiology consulted for arrhythmia.     Arrhythmia   - EKG on admission with NSR, RBBB, repeat unchanged  - No signs of active ischemia  - Tele strip reviewed, p waves noted in all strips likely SR with SA, no afib noted, tele now d/c'd  - BP and HR within normal limits  - Continue Asa    - D-Ddimer elevated with neg dopplers  - v/q: Very low probability of PE  - F/u echo  - No signs of significant volume overload.     - cont rx for colitis  -  Monitor and replete lytes, keep K>4, Mg>2.    - All other medical needs as per primary team.  - Other cardiovascular workup will depend on clinical course.  - Will continue to follow.    Avani Rubio, MS ANP, AGACNP  Nurse Practitioner- Cardiology   Spectra #[phone]

## 2021-11-28 NOTE — PROGRESS NOTE ADULT - PROBLEM SELECTOR PLAN 5
unknown baseline, 11.7 on admit, stable  - iron studies consistent with anemia of chronic disease  - B12, folate WNL - unknown baseline, 11.7 on admit, stable  - iron studies consistent with anemia of chronic disease  - B12, folate WNL

## 2021-11-28 NOTE — PROGRESS NOTE ADULT - SUBJECTIVE AND OBJECTIVE BOX
[facility] -- Britany Stark, Diana, Raoul, Jason Puckett, Sharyn Aguilar: Office # [phone]    Follow Up:  Abnormal ekg    Subjective/Observations: Patient seen and examined. Patient awake, alert, resting comfortably in bed. No complaints of chest pain, dyspnea, palpitations or dizziness. Tolerating O2 via nasal cannula.     REVIEW OF SYSTEMS: All review of systems is negative for eye, ENT, GI, , allergic, dermatologic, musculoskeletal and neurologic except as described above    PAST MEDICAL & SURGICAL HISTORY:  Shoulder fracture    Bladder cancer    Stented coronary artery  over 15 years ago as per patient    Parkinsons    S/P cardiac catheterization        MEDICATIONS  (STANDING):  aspirin  chewable 81 milliGRAM(s) Oral daily  heparin   Injectable 5000 Unit(s) SubCutaneous every 8 hours  saccharomyces boulardii 250 milliGRAM(s) Oral two times a day    MEDICATIONS  (PRN):  acetaminophen     Tablet .. 650 milliGRAM(s) Oral every 6 hours PRN Temp greater or equal to 38C (100.4F), Mild Pain (1 - 3)  melatonin 3 milliGRAM(s) Oral at bedtime PRN Insomnia    Allergies    clindamycin (Unknown)  Levaquin (Unknown)    Intolerances      Vital Signs Last 24 Hrs  T(C): 36.6 (28 Nov 2021 05:14), Max: 36.8 (27 Nov 2021 15:15)  T(F): 97.9 (28 Nov 2021 05:14), Max: 98.2 (27 Nov 2021 15:15)  HR: 86 (28 Nov 2021 05:14) (82 - 86)  BP: 112/65 (28 Nov 2021 05:14) (112/65 - 122/68)  BP(mean): --  RR: 18 (28 Nov 2021 05:14) (18 - 19)  SpO2: 95% (28 Nov 2021 05:14) (95% - 97%)  I&O's Summary    27 Nov 2021 07:01  -  28 Nov 2021 07:00  --------------------------------------------------------  IN: 0 mL / OUT: 751 mL / NET: -751 mL          TELE: Not on telemetry   PHYSICAL EXAM:  Appearance: NAD, no distress, alert, Well developed   HEENT: Moist Mucous Membranes, Anicteric  Cardiovascular: Regular rate and rhythm, Normal S1 S2, No JVD, + murmur, No rubs, gallops or clicks  Respiratory: Non-labored, Clear to auscultation, No rales, No rhonchi, No wheezing.   Gastrointestinal:  Soft, Non-tender, + BS  Neurologic: Non-focal  Skin: Warm and dry, No visible rashes or ulcers, No ecchymosis, No cyanosis  Musculoskeletal: No clubbing, No cyanosis, No joint swelling/tenderness  Psychiatry: Mood & affect appropriate  Lymph: No peripheral edema.     LABS: All Labs Reviewed:                        10.7   8.16  )-----------( 231      ( 28 Nov 2021 08:48 )             34.2                         10.5   10.72 )-----------( 230      ( 27 Nov 2021 07:30 )             32.1                         10.6   10.93 )-----------( 216      ( 26 Nov 2021 07:46 )             32.1     28 Nov 2021 08:48    142    |  112    |  18     ----------------------------<  88     3.7     |  26     |  1.80   27 Nov 2021 17:34    141    |  112    |  20     ----------------------------<  108    4.1     |  24     |  1.80   27 Nov 2021 07:30    140    |  109    |  22     ----------------------------<  104    3.1     |  23     |  1.80     Ca    9.2        28 Nov 2021 08:48  Ca    8.7        27 Nov 2021 17:34  Ca    8.6        27 Nov 2021 07:30  Phos  2.3       28 Nov 2021 08:48  Phos  3.4       27 Nov 2021 17:34  Phos  1.5       27 Nov 2021 07:30  Mg     1.9       28 Nov 2021 08:48  Mg     2.3       26 Nov 2021 07:46    TPro  6.5    /  Alb  2.7    /  TBili  0.3    /  DBili  x      /  AST  22     /  ALT  17     /  AlkPhos  39     28 Nov 2021 08:48      Creatine Kinase, Serum: 224 U/L (11-25-21 @ 05:13)  Troponin I, High Sensitivity Result: 8.1 ng/L (11-24-21 @ 22:27)  Creatine Kinase, Serum: 185 U/L (11-24-21 @ 22:27)      D-Dimer Assay, Quantitative: 764 ng/mL DDU (11-26-21 @ 11:41)      12 Lead ECG:   Ventricular Rate 80 BPM  Atrial Rate 80 BPM  P-R Interval 168 ms  QRS Duration 140 ms  Q-T Interval 416 ms  QTC Calculation(Bazett) 479 ms  P Axis 11 degrees  R Axis -27 degrees  T Axis -6 degrees  Diagnosis Line Normal sinus rhythm with sinus arrhythmia  Right bundle branch block  Inferior infarct (cited on or before 24-NOV-2021)  Abnormal ECG  When compared with ECG of 24-NOV-2021 22:14,  No significant change was found  Confirmed by STEPHEN VERA ([id number]) on 11/26/2021 6:11:40 PM (11-26-21 @ 10:56)    < from: NM Pulmonary Ventilation/Perfusion Scan (11.26.21 @ 16:07) >  EXAM:  NM PULM VENTILATION PERFUS IMG                        PROCEDURE DATE:  11/26/2021    INTERPRETATION:  RADIOPHARMACEUTICAL: 1 mCi Tc-99m-DTPA aerosol by inhalation; 6 mCi Tc-99m-MAA, I.V.  CLINICAL STATEMENT: 89-year-old male withhistory of syncope and elevated d-dimer.  TECHNIQUE:  Ventilation and perfusion images of the lungs were obtained following administration of Tc-99m-DTPA and Tc-99m-MAA. Images were obtained in the anterior, posterior, both lateral, and all 4 oblique projections. The study was interpreted in conjunction with chest radiograph of 11/26/2021.  No prior VQ scan.  FINDINGS: There is heterogeneous tracer distribution in the lungs, on both the ventilation and the perfusion images. There are no discrete segmental perfusion defects.    IMPRESSION:  Very low probability of pulmonary embolus.  --- End of Report ---  < end of copied text >      < from: US Duplex Venous Lower Ext Complete, Bilateral (11.26.21 @ 12:32) >  EXAM:  US DPLX LWR EXT VEINS COMPL BI                        PROCEDURE DATE:  11/26/2021    INTERPRETATION:  CLINICAL INFORMATION: Atrial fibrillation. Admitted with syncope. Evaluate for DVT.  COMPARISON: None available.  TECHNIQUE: Duplex sonography of the BILATERAL LOWER extremity veins with color and spectral Doppler, with and without compression.  FINDINGS:  RIGHT:  Normal compressibility of the RIGHT common femoral, femoral and popliteal veins.  Doppler examination showsnormal spontaneous and phasic flow.  No RIGHT calf vein thrombosis is detected.  LEFT:  Normal compressibility of the LEFT common femoral, femoral and popliteal veins.  Doppler examination shows normal spontaneous and phasic flow.  No LEFT calf vein thrombosis is detected.    IMPRESSION:  No evidence of deep venous thrombosis in either lower extremity.  < end of copied text >    < from: CT Head No Cont (11.24.21 @ 23:16) >  IMPRESSION:  No acute intracranial hemorrhage, territorial infarct, mass effect or calvarial fracture.  --- End of Report ---  < end of copied text >

## 2021-11-28 NOTE — PROGRESS NOTE ADULT - PROBLEM SELECTOR PLAN 6
- Hypophosphatemic - replete PRN   - hypokalemia resolved   - continue to monitor electrolytes in AM CMP + P  - nephrology following, Dr. Freeman, recs appreciated - hx of PCI to RPL1 in 06/2008 per chart review  - pt has not taken anti-platelet agents or statins for years  - pt wishes to avoid medications and not interested in statin  - accepted ASA 81mg po daily

## 2021-11-28 NOTE — PROGRESS NOTE ADULT - PROBLEM SELECTOR PLAN 4
SMITH vs CKD  - Patient has no known history of kidney disease, but also with very poor outpt f/up and is a poor historian  Unknown creatinine/BUN baseline  - Admssion Cr 2.20, down downtrended and stable at 1.8, likely @ baseline per Nephro   - US of kidney: technically limited, R renal pelviectasis, large b/l renal cysts w/ complex appearing cyst R kidney, patient to follow up with Urology outpatient   - Monitor CMP daily   - nephrology following, Dr. Freeman, recs appreciated  - Patient reports he used to see PCP Dr. Montes De Oca, will reach out to his office Monday for records SMITH vs CKD  - Patient has no known history of kidney disease, but also with very poor outpt f/up and is a poor historian  Unknown creatinine/BUN baseline  - Admission Cr 2.20, downtrended and stable at 1.8, likely @ baseline per Nephro   - US of kidney: technically limited, R renal pelviectasis, large b/l renal cysts w/ complex appearing cyst R kidney, patient to follow up with Urology outpatient   - Monitor CMP daily   - nephrology following, Dr. Freeman, recs appreciated  - Patient reports he used to see PCP Dr. Montes De Oca, will reach out to his office Monday for records

## 2021-11-28 NOTE — PROGRESS NOTE ADULT - PROBLEM SELECTOR PLAN 2
- Patient came in after a fall from chair. Patient states that he lost balance and fell backward but does not remember details of what happened after. Mechanical fall vs. syncope.   - CT head showed no acute pathology - lateral ventricles dilated out of proportion to the sulcal prominence which could be due to central atrophy versus normal pressure hydrocephalus, but could also be due to atrophy  - Continue to monitor   - Check orthostatic vital signs, communicated w/ RN  - Fall precautious   - Neurology consulted (Dr. Rudolph), recs appreciated   - PT evaluation, recs [facility] - Patient came in after a fall from chair. Patient states that he lost balance and fell backward but does not remember details of what happened after. Mechanical fall vs. syncope.   - CT head showed no acute pathology - lateral ventricles dilated out of proportion to the sulcal prominence which could be due to central atrophy versus normal pressure hydrocephalus, but could also be due to atrophy  - orthostatic vital signs -- negative  - Fall precautious   - Neurology consulted (Dr. Rudolph), recs appreciated   - PT evaluation, recs [facility] -- pt hesitant to go to [facility] but will reassess with PT tomorrow and see whether at all feasible to go home with only his wife's support

## 2021-11-28 NOTE — PROGRESS NOTE ADULT - PROBLEM SELECTOR PLAN 7
Patient asymptomatic  - UA unimpressive w/ trace ketones, 100 protein, moderate blood, moderate bacteria, 3-5 granular casts  - UCx negative  - Tylenol PRN for fever   - Infectious Disease consulted (Dr. Anderson); recs appreciated - Hypophosphatemic - replete PRN   - hypokalemia resolved   - continue to monitor electrolytes in AM CMP + P  - nephrology following, Dr. Freeman, recs appreciated

## 2021-11-28 NOTE — DISCHARGE NOTE PROVIDER - HOSPITAL COURSE
FROM ADMISSION HPI: 89 yo M w/ PMHx/ Parkinson's Disease (on no medications), CAD (s/p stent), Bladder cancer (s/p tumor resection) presented to the ED after a fall. Patient was sitting on a rolling chair and fell over. Patient states that he remembers the fall - no dizziness or prodromal symptoms. States that he lost his balance and fell backwards. However, wife at bedside states that she went to go see him and found him on the floor which is when she called 911 and was brought the the ED. Patient does not recall if he hit his head, or LOC but states that after he felt dizziness. Patient's CT abdomen showed sigmoid colitis however patient not complaining of N/V/D but came in with a temperature of 103. Patient denies dizziness at this point, CP or SOB     Of note, Patient lives with wife, and independent at baseline. Patient is not vaccinated for COVID-19 or influenza.    In the ED,   Vitals: T: 103.1, BP: 111/56, RR: 16, O2: 97%   Labs: WBC: 11.48, H/H 11.7/34.2, BUN/Cr 34/2.2, eGFR 25, proBNP 1008.   UA trace ketones, 100 protein, moderate blood, moderate bacteria, 3-5 granular casts.   Imaging:   Chest XRay: Lungs appear clear with calcification of aortic notch (wet read)  CT Head: No acute intracranial hemorrhage, territorial infarct, mass effect or calvarial fracture   CT Abdomen: 1. No nephrolithiasis, hydronephrosis or obstructive uropathy.  2. Mid to distal sigmoid colitis which is infectious, inflammatory or ischemic in etiology.  US Kidney: Pending   EKG: NSR, RBBB, Inferior infarct, age undetermined (no prior EKG to compare to)   Interventions: NS Bolus x1, Rocephin x1    ---  HOSPITAL COURSE: Patient was admitted to telemetry for workup of fall at home, colitis and SMITH. Patient met sepsis criteria on admission. Patient started on empiric IV Rocephin and IV Flagyl. CT a/p results as above. ID was consulted. GI PCR resulted ---- C. diff ----     D-dimer ---- V/Q scan resulted with low probability of PE.     For SMITH, Nephrology was consulted. Kidney ultrasound showed ---- Urine lytes ----    Iron studies ----     For fall, CT head results as above. Neurology was consulted. Physical Therapy consulted, recommends discharge to [facility].      Cardiology was consulted for an arrhythmia noted on telemetry monitoring, thought to be atrial fibrillation. Repeat EKG again demonstrated NSR and a RBBB. Review of telemetry strips demonstrated p-waves. Echo performed ----    Patient showed improvement throughout hospitalization. Patient was seen and examined on day of discharge:     VITALS:     PHYSICAL EXAM:    Patient was medically optimized for discharge to ---- with close outpatient follow up.    ---  CONSULTANTS:   ID- Dr. Anderson  Nephrology- Dr. Mata  Neurology- Dr. Rudolph  Cardiology- Dr. Gomez     ---  TIME SPENT:  I, the attending physician, was physically present for the key portions of the evaluation and management (E/M) service provided. The total amount of time spent reviewing the hospital notes, laboratory values, imaging findings, assessing/counseling the patient, discussing with consultant physicians, social work, nursing staff was -- minutes    ---  Primary care provider was made aware of plan for discharge:      [  ] NO     [ x ] YES   FROM ADMISSION HPI: 89 yo M w/ PMHx/ Parkinson's Disease (on no medications), CAD (s/p stent), Bladder cancer (s/p tumor resection) presented to the ED after a fall. Patient was sitting on a rolling chair and fell over. Patient states that he remembers the fall - no dizziness or prodromal symptoms. States that he lost his balance and fell backwards. However, wife at bedside states that she went to go see him and found him on the floor which is when she called 911 and was brought the the ED. Patient does not recall if he hit his head, or LOC but states that after he felt dizziness. Patient's CT abdomen showed sigmoid colitis however patient not complaining of N/V/D but came in with a temperature of 103. Patient denies dizziness at this point, CP or SOB     Of note, Patient lives with wife, and independent at baseline. Patient is not vaccinated for COVID-19 or influenza.    In the ED,   Vitals: T: 103.1, BP: 111/56, RR: 16, O2: 97%   Labs: WBC: 11.48, H/H 11.7/34.2, BUN/Cr 34/2.2, eGFR 25, proBNP 1008.   UA trace ketones, 100 protein, moderate blood, moderate bacteria, 3-5 granular casts.   Imaging:   Chest XRay: Lungs appear clear with calcification of aortic notch (wet read)  CT Head: No acute intracranial hemorrhage, territorial infarct, mass effect or calvarial fracture   CT Abdomen: 1. No nephrolithiasis, hydronephrosis or obstructive uropathy.  2. Mid to distal sigmoid colitis which is infectious, inflammatory or ischemic in etiology.  US Kidney: Pending   EKG: NSR, RBBB, Inferior infarct, age undetermined (no prior EKG to compare to)   Interventions: NS Bolus x1, Rocephin x1    ---  HOSPITAL COURSE: Patient was admitted to telemetry for workup of fall at home, colitis and SMITH. Patient met sepsis criteria on admission. Patient started on empiric IV Rocephin and IV Flagyl. CT a/p results as above. ID was consulted. GI PCR resulted in growth of cryptosporidium and patient was monitored off antibiotics because cryptosporidium diarrhea is usually self limited. Patient improved with conservative management.    D-dimer was elevated @764 w/ subsequent V/Q scan resulted with low probability of PE.     For SMITH, Nephrology was consulted. Kidney ultrasound showed no hydronephrosis. Urine lytes significant for protein 88 and protein cr ratio 1.4.    Iron studies: ferritin 334 | iron 15 | TIBC 155 | %sat 9 consistent w/ anemia of chronic disease.    For fall, CT head results as above. Neurology was consulted. Physical Therapy consulted, recommends discharge to [facility].    Cardiology was consulted for an arrhythmia noted on telemetry monitoring, thought to be atrial fibrillation. Repeat EKG again demonstrated NSR and a RBBB. Review of telemetry strips demonstrated p-waves. Echo performed ----    Patient showed improvement throughout hospitalization. Patient was seen and examined on day of discharge:     VITALS:     PHYSICAL EXAM:    Patient was medically optimized for discharge to [facility] with close outpatient follow up.    ---  CONSULTANTS:   ID- Dr. Anderson  Nephrology- Dr. Mata  Neurology- Dr. Rudolph  Cardiology- Dr. Gomez     ---  TIME SPENT:  I, the attending physician, was physically present for the key portions of the evaluation and management (E/M) service provided. The total amount of time spent reviewing the hospital notes, laboratory values, imaging findings, assessing/counseling the patient, discussing with consultant physicians, social work, nursing staff was -- minutes    ---  Primary care provider was made aware of plan for discharge:      [  ] NO     [ x ] YES   FROM ADMISSION HPI: 89 yo M w/ PMHx/ Parkinson's Disease (on no medications), CAD (s/p stent), Bladder cancer (s/p tumor resection) presented to the ED after a fall. Patient was sitting on a rolling chair and fell over. Patient states that he remembers the fall - no dizziness or prodromal symptoms. States that he lost his balance and fell backwards. However, wife at bedside states that she went to go see him and found him on the floor which is when she called 911 and was brought the the ED. Patient does not recall if he hit his head, or LOC but states that after he felt dizziness. Patient's CT abdomen showed sigmoid colitis however patient not complaining of N/V/D but came in with a temperature of 103. Patient denies dizziness at this point, CP or SOB     Of note, Patient lives with wife, and independent at baseline. Patient is not vaccinated for COVID-19 or influenza.    In the ED,   Vitals: T: 103.1, BP: 111/56, RR: 16, O2: 97%   Labs: WBC: 11.48, H/H 11.7/34.2, BUN/Cr 34/2.2, eGFR 25, proBNP 1008.   UA trace ketones, 100 protein, moderate blood, moderate bacteria, 3-5 granular casts.   Imaging:   Chest XRay: Lungs appear clear with calcification of aortic notch (wet read)  CT Head: No acute intracranial hemorrhage, territorial infarct, mass effect or calvarial fracture   CT Abdomen: 1. No nephrolithiasis, hydronephrosis or obstructive uropathy.  2. Mid to distal sigmoid colitis which is infectious, inflammatory or ischemic in etiology.  US Kidney: Pending   EKG: NSR, RBBB, Inferior infarct, age undetermined (no prior EKG to compare to)   Interventions: NS Bolus x1, Rocephin x1    ---  HOSPITAL COURSE: Patient was admitted to telemetry for workup of fall at home, colitis and SMITH. Patient met sepsis criteria on admission. Patient started on empiric IV Rocephin and IV Flagyl. CT a/p results as above. ID was consulted. GI PCR resulted in growth of cryptosporidium and patient was monitored off antibiotics because cryptosporidium diarrhea is usually self limited. Patient improved with conservative management.    D-dimer was elevated @764 w/ subsequent V/Q scan resulted with low probability of PE.     For SMITH, Nephrology was consulted. Kidney ultrasound showed no hydronephrosis. Urine lytes significant for protein 88 and protein cr ratio 1.4.    Iron studies: ferritin 334 | iron 15 | TIBC 155 | %sat 9 consistent w/ anemia of chronic disease.    For fall, CT head results as above. Neurology was consulted. Physical Therapy consulted, recommends discharge to [facility].    Cardiology was consulted for an arrhythmia noted on telemetry monitoring, thought to be atrial fibrillation. Repeat EKG again demonstrated NSR and a RBBB. Review of telemetry strips demonstrated p-waves. Echo performed showing LVEF 60% w/ trace MR and mild AS.    Patient showed improvement throughout hospitalization. Patient was seen and examined on day of discharge:     ROS: Patient seen and evaluated at the bedside. No acute overnight events reported. Patient denies diarrhea, also denies abdominal pain. He is tolerating a regular DASH/TLC diet well without dysphagia, n/v, or abdominal pain. Denies fevers, chills, chest pain, or dyspnea.    VITALS:   T(C): 36.7 (30 Nov 2021 04:17), Max: 37.6 (29 Nov 2021 20:37)  T(F): 98.1 (30 Nov 2021 04:17), Max: 99.7 (29 Nov 2021 20:37)  HR: 83 (30 Nov 2021 04:17) (83 - 91)  BP: 100/60 (30 Nov 2021 04:17) (100/60 - 126/62)  BP(mean): --  RR: 18 (30 Nov 2021 04:17) (17 - 18)  SpO2: 97% (30 Nov 2021 04:17) (93% - 97%)    PHYSICAL EXAM:  GENERAL: NAD  HEENT:  anicteric, moist mucous membranes  CHEST/LUNG:  CTA b/l, no rales, wheezes, or rhonchi  HEART:  RRR, S1, S2  ABDOMEN:  BS+, soft, nondistended, nondistended  EXTREMITIES: trace LE edema b/l, no cyanosis or calf tenderness  NERVOUS SYSTEM: answers questions and follows commands appropriately    Patient was medically optimized for discharge to [facility] with close outpatient follow up.    ---  CONSULTANTS:   ID- Dr. Anderson  Nephrology- Dr. Mata  Neurology- Dr. Rudolph  Cardiology- Dr. Gomez     ---  TIME SPENT:  I, the attending physician, was physically present for the key portions of the evaluation and management (E/M) service provided. The total amount of time spent reviewing the hospital notes, laboratory values, imaging findings, assessing/counseling the patient, discussing with consultant physicians, social work, nursing staff was -- minutes    ---  Primary care provider was made aware of plan for discharge:      [  ] NO     [ x ] YES   FROM ADMISSION HPI: 89 yo M w/ PMHx/ Parkinson's Disease (on no medications), CAD (s/p stent), Bladder cancer (s/p tumor resection) presented to the ED after a fall. Patient was sitting on a rolling chair and fell over. Patient states that he remembers the fall - no dizziness or prodromal symptoms. States that he lost his balance and fell backwards. However, wife at bedside states that she went to go see him and found him on the floor which is when she called 911 and was brought the the ED. Patient does not recall if he hit his head, or LOC but states that after he felt dizziness. Patient's CT abdomen showed sigmoid colitis however patient not complaining of N/V/D but came in with a temperature of 103. Patient denies dizziness at this point, CP or SOB     Of note, Patient lives with wife, and independent at baseline. Patient is not vaccinated for COVID-19 or influenza.    In the ED,   Vitals: T: 103.1, BP: 111/56, RR: 16, O2: 97%   Labs: WBC: 11.48, H/H 11.7/34.2, BUN/Cr 34/2.2, eGFR 25, proBNP 1008.   UA trace ketones, 100 protein, moderate blood, moderate bacteria, 3-5 granular casts.   Imaging:   Chest XRay: Lungs appear clear with calcification of aortic notch (wet read)  CT Head: No acute intracranial hemorrhage, territorial infarct, mass effect or calvarial fracture   CT Abdomen: 1. No nephrolithiasis, hydronephrosis or obstructive uropathy.  2. Mid to distal sigmoid colitis which is infectious, inflammatory or ischemic in etiology.  US Kidney: Pending   EKG: NSR, RBBB, Inferior infarct, age undetermined (no prior EKG to compare to)   Interventions: NS Bolus x1, Rocephin x1    ---  HOSPITAL COURSE: Patient was admitted to telemetry for workup of fall at home, colitis and SMITH. Patient met sepsis criteria on admission. Patient started on empiric IV Rocephin and IV Flagyl. CT a/p results as above. ID was consulted. GI PCR resulted in growth of cryptosporidium and patient was monitored off antibiotics because cryptosporidium diarrhea is usually self limited. Patient improved with conservative management.    D-dimer was elevated @764 w/ subsequent V/Q scan resulted with low probability of PE.     For SMITH, Nephrology was consulted. Kidney ultrasound showed no hydronephrosis. Urine lytes significant for protein 88 and protein cr ratio 1.4.    Iron studies: ferritin 334 | iron 15 | TIBC 155 | %sat 9 consistent w/ anemia of chronic disease.    For fall, CT head results as above. Neurology was consulted. Physical Therapy consulted, recommends discharge to [facility].    Cardiology was consulted for an arrhythmia noted on telemetry monitoring, thought to be atrial fibrillation. Repeat EKG again demonstrated NSR and a RBBB. Review of telemetry strips demonstrated p-waves. Echo performed showing LVEF 60% w/ trace MR and mild AS.    Patient showed improvement throughout hospitalization.     On day of discharge:   ROS: Patient seen and evaluated at the bedside. No acute overnight events reported. Patient denies diarrhea or abdominal pain. He is tolerating a regular DASH/TLC diet well without dysphagia, n/v, or abdominal pain. Denies fevers, chills, chest pain, or dyspnea.    VITALS:   T(C): 36.7 (30 Nov 2021 04:17), Max: 37.6 (29 Nov 2021 20:37)  T(F): 98.1 (30 Nov 2021 04:17), Max: 99.7 (29 Nov 2021 20:37)  HR: 83 (30 Nov 2021 04:17) (83 - 91)  BP: 100/60 (30 Nov 2021 04:17) (100/60 - 126/62)  BP(mean): --  RR: 18 (30 Nov 2021 04:17) (17 - 18)  SpO2: 97% (30 Nov 2021 04:17) (93% - 97%)    PHYSICAL EXAM:  GENERAL: NAD  HEENT:  anicteric, moist mucous membranes  CHEST/LUNG:  CTA b/l, no rales, wheezes, or rhonchi  HEART:  RRR, S1, S2  ABDOMEN:  BS+, soft, nondistended, nondistended  EXTREMITIES: no edema, cyanosis or calf tenderness  NERVOUS SYSTEM: answers questions and follows commands appropriately      ---  CONSULTANTS:   ID- Dr. Anderson  Nephrology- Dr. Mata  Neurology- Dr. Rudolph  Cardiology- Dr. Gomez     ---  TIME SPENT: 40min

## 2021-11-28 NOTE — DISCHARGE NOTE PROVIDER - NSDCCPCAREPLAN_GEN_ALL_CORE_FT
PRINCIPAL DISCHARGE DIAGNOSIS  Diagnosis: Acute colitis  Assessment and Plan of Treatment:       SECONDARY DISCHARGE DIAGNOSES  Diagnosis: SMITH (acute kidney injury)  Assessment and Plan of Treatment:     Diagnosis: Renal cyst  Assessment and Plan of Treatment:      PRINCIPAL DISCHARGE DIAGNOSIS  Diagnosis: Acute colitis  Assessment and Plan of Treatment: You were diagnosed with acute colitis while at the hospital. CT scan showed mid to distal sigmoid colitis likely infectious, inflammatory or ischemic. You were started on IV antibiotics (ceftriaxone and flagyl). Subsequent GI PCR tested positive for cryptosporidium, a parasite that can cause infectious diarrhea. This infection is usually self limited and per ID, your antibiotics were discontinued and you were monitored off of antibiotics. You showed improvement through your hospital stay. Please follow up with PCP for further reccomendations.      SECONDARY DISCHARGE DIAGNOSES  Diagnosis: SMITH (acute kidney injury)  Assessment and Plan of Treatment: Your creatinine levels were elevated while at the hospital, however, your baseline levels are unknown. Please follow up with urologist, nephrologist, and PCP within 2 weeks of discharge.    Diagnosis: Renal cyst  Assessment and Plan of Treatment:      PRINCIPAL DISCHARGE DIAGNOSIS  Diagnosis: Acute colitis  Assessment and Plan of Treatment: You were diagnosed with acute colitis while at the hospital. CT scan showed mid to distal sigmoid colitis likely infectious, inflammatory or ischemic. You were started on IV antibiotics (ceftriaxone and flagyl). Subsequent GI PCR tested positive for cryptosporidium, a parasite that can cause infectious diarrhea. This infection is usually self limited and per ID, your antibiotics were discontinued and you were monitored off of antibiotics. You showed improvement through your hospital stay. Please follow up with PCP for further reccomendations.      SECONDARY DISCHARGE DIAGNOSES  Diagnosis: SMITH (acute kidney injury)  Assessment and Plan of Treatment: Your creatinine levels were elevated while at the hospital, however, your baseline levels are unknown, however, is likely your baseline kidney function. A renal ultrasound was performed showing no evidence of hydronephrosis. You were started on flomax while in the hospital for urinary retention. Please follow up with urologist, nephrologist, and PCP within 2 weeks of discharge.    Diagnosis: Renal cyst  Assessment and Plan of Treatment: A renal cyst on your right kidney was found incidentally on ultrasound imaging. No acute interventions were performed while on the hospital. Please follow up with outpatient urologist, nephrologist, and PCP within 2 weeks of discharge for further recommendations.    Diagnosis: Fall  Assessment and Plan of Treatment: You reported fall on admission to the hospital. Orthostatic blood pressure was normal while in the hospital, which means your blood pressure is stable from lying to standing position and is likely not the cause of your fall. An echocardiogram was performed showing preserved ejection fraction with mild aortic stenosis, not likely to be cause of a syncopal fall. Your heart was monitored on remote telemetry while in hospital without evidence of atrial fibrillation. Please ensure adquate hydration and follow up with PCP for further recommendations.     PRINCIPAL DISCHARGE DIAGNOSIS  Diagnosis: Acute colitis  Assessment and Plan of Treatment: You were diagnosed with acute colitis while at the hospital. CT scan showed mid to distal sigmoid colitis. You were treated with IV antibiotics (ceftriaxone and flagyl). Subsequent GI PCR tested positive for cryptosporidium, a potential cause for infectious diarrhea. This infection is usually self limited and you do not require any more antibiotics. You were monitored off antibiotics and continued to improve.   Please follow up with PCP after rehab. You can re-establish care with Dr. Montes De Oca, or if you woud like to make a change, can try to see Dr. Cabezas (number below).      SECONDARY DISCHARGE DIAGNOSES  Diagnosis: SMITH (acute kidney injury)  Assessment and Plan of Treatment: Your creatinine levels were elevated while at the hospital, however, your baseline levels are unknown, as you have not been to physician for several years. A renal ultrasound was performed showing no evidence of hydronephrosis. You were started on flomax while in the hospital for some symptoms of an elarged prostate. Please follow up with a nephrologist to monitor your kidney function (current creatinine is 1.8 and we suspect is your current baseline). Please see a urologist to assess your urinary flow.   You can see Dr. Bray (nephrology) number below and Dr. Camp for urology (number below) or physicians of your choice.    Diagnosis: Renal cyst  Assessment and Plan of Treatment: A renal cyst on your right kidney was found incidentally on ultrasound imaging. No acute interventions were performed while in the hospital. Please follow up with outpatient urologist or a PCP after discharge from rehab. You can re-establish care with Dr. Montes De Oca, or if you woud like to make a change, can try to see Dr. Cabezas (number below).    Diagnosis: Fall  Assessment and Plan of Treatment: You reported a fall on admission to the hospital. From your description on admission, this may have been a mechanical fall. An echocardiogram was performed showing preserved ejection fraction with mild aortic stenosis, not likely to be cause of a fall. Your heart was monitored on remote telemetry while in hospital without evidence of significant arrhythmia. Please ensure adequate hydration and follow up with PCP for further recommendations.    Diagnosis: CAD (coronary artery disease)  Assessment and Plan of Treatment: You had history of cardiac stent in 2008. Please take a low-dose aspirin unless you have any evidence of bleeding. Monitor your stool and if you have black or red bloody stool, call your doctor immediately or return to the ER.    Diagnosis: History of low back pain  Assessment and Plan of Treatment: You may use Tylenol and the lidocaine patch as written for your chronic low back pain. Follow up with PCP or an orthopedist as an outpatient after rehab.

## 2021-11-28 NOTE — PROGRESS NOTE ADULT - PROBLEM SELECTOR PLAN 8
VTE ppx: HSQ  - fall/aspiration precautions  - GOC discussed with patient: States that he has not thought of this before but had been meaning to discuss with his family. Would like to make a family decision. Full Code for now. Patient asymptomatic  - UA unimpressive w/ trace ketones, 100 protein, moderate blood, moderate bacteria, 3-5 granular casts  - UCx negative  - Tylenol PRN for fever   - Infectious Disease consulted (Dr. Anderson); recs appreciated

## 2021-11-28 NOTE — PROGRESS NOTE ADULT - SUBJECTIVE AND OBJECTIVE BOX
Neurology follow up note    HAYDER [id number]      Interval History:    Patient feels ok no new complaints.    Allergies    clindamycin (Unknown)  Levaquin (Unknown)    Intolerances        MEDICATIONS    acetaminophen     Tablet .. 650 milliGRAM(s) Oral every 6 hours PRN  aspirin  chewable 81 milliGRAM(s) Oral daily  heparin   Injectable 5000 Unit(s) SubCutaneous every 8 hours  melatonin 3 milliGRAM(s) Oral at bedtime PRN  saccharomyces boulardii 250 milliGRAM(s) Oral two times a day              Vital Signs Last 24 Hrs  T(C): 36.6 (28 Nov 2021 05:14), Max: 36.8 (27 Nov 2021 15:15)  T(F): 97.9 (28 Nov 2021 05:14), Max: 98.2 (27 Nov 2021 15:15)  HR: 86 (28 Nov 2021 05:14) (82 - 86)  BP: 112/65 (28 Nov 2021 05:14) (112/65 - 122/68)  BP(mean): --  RR: 18 (28 Nov 2021 05:14) (18 - 19)  SpO2: 95% (28 Nov 2021 05:14) (95% - 97%)    REVIEW OF SYSTEMS:  Constitutional:  The patient denies fever, chills, or night sweats.  Head:  No headaches.  Eyes:  No double vision or blurry vision.  Ears:  No ringing in the ears.  Neck:  No neck pain.  Respiratory:  No shortness of breath.  Cardiovascular:  No chest pain.  Abdomen:  No nausea, vomiting, or abdominal pain.  Extremities/Neurological:  No numbness or tingling.  Musculoskeletal:  Occasional joint pain.    PHYSICAL EXAMINATION:   HEENT:  Head:  Normocephalic, atraumatic.  Eyes:  No scleral icterus.  Ears:  Hearing bilaterally was intact.  NECK:  Supple.  RESPIRATORY:  Good air entry bilaterally.  CARDIOVASCULAR:  S1 and S2 heard.  ABDOMEN:  Soft and nontender.  EXTREMITIES:  No clubbing or cyanosis was noted.      NEUROLOGIC:  The patient is awake and alert.  Location was hospital,    Was able to follow complex commands, took right hand touch left ear.  Speech was fluent.  Smile was symmetric.  Motor:  Bilateral upper were 4/5, bilateral lower were 4-/5.  The patient has slight resting tremors.  Slight bradykinesia.                   LABS:  CBC Full  -  ( 27 Nov 2021 07:30 )  WBC Count : 10.72 K/uL  RBC Count : 3.36 M/uL  Hemoglobin : 10.5 g/dL  Hematocrit : 32.1 %  Platelet Count - Automated : 230 K/uL  Mean Cell Volume : 95.5 fl  Mean Cell Hemoglobin : 31.3 pg  Mean Cell Hemoglobin Concentration : 32.7 gm/dL  Auto Neutrophil # : 7.57 K/uL  Auto Lymphocyte # : 0.63 K/uL  Auto Monocyte # : 1.58 K/uL  Auto Eosinophil # : 0.31 K/uL  Auto Basophil # : 0.03 K/uL  Auto Neutrophil % : 70.6 %  Auto Lymphocyte % : 5.9 %  Auto Monocyte % : 14.7 %  Auto Eosinophil % : 2.9 %  Auto Basophil % : 0.3 %      11-27    141  |  112<H>  |  20  ----------------------------<  108<H>  4.1   |  24  |  1.80<H>    Ca    8.7      27 Nov 2021 17:34  Phos  3.4     11-27      Hemoglobin A1C:       Vitamin B12         RADIOLOGY      ANALYSIS AND PLAN:  A 89-year-old with an episode of dizziness, fall, and Parkinson's.  For episode of fall, suspect this could be secondary to age-related changes, balance issues, underlying Parkinson's, slight dehydration, possibly episode of presyncopal event.  The examination does not show any signs at present to suggest a new cerebrovascular accident has ensued.  Telemetry evaluation as needed.  Monitor systolic blood pressure.  For mild cognitive impairment supportive treatment   For history of Parkinson's disease, the patient appears to be stable, currently on no medications.  For episodes of dizziness, monitor systolic blood pressure.  physical therapy   as tolerated      Attempting to speak with the spouse, Keyla, at [phone]. 11/25  She appears to have some underlying mild cognitive impairment as well.    Greater than 34 minutes of time was spent with the patient, plan of care, reviewing data, speaking to the multidisciplinary healthcare team with greater than 50% of that time in counseling and care coordination.    Thank you for the courtesy of this consultation.

## 2021-11-28 NOTE — DISCHARGE NOTE PROVIDER - NSDCMRMEDTOKEN_GEN_ALL_CORE_FT
acetaminophen 325 mg oral tablet: 2 tab(s) orally every 6 hours, As needed, for pain  aspirin 81 mg oral tablet, chewable: 1 tab(s) orally once a day  lidocaine 4% topical film: Apply topically to affected area once a day to lower back  saccharomyces boulardii lyo 250 mg oral capsule: 1 cap(s) orally 2 times a day for 1 week  tamsulosin 0.4 mg oral capsule: 1 cap(s) orally once a day (at bedtime)

## 2021-11-28 NOTE — PROGRESS NOTE ADULT - SUBJECTIVE AND OBJECTIVE BOX
Patient is a 89y old  Male who presents with a chief complaint of Sigmoid Colitis       INTERVAL HPI/OVERNIGHT EVENTS: Patient seen and evaluated at the bedside. No acute overnight events reported. Patient denies further episodes of diarrhea, also denies abdominal pain. He is tolerating a full liquid diet. Denies fevers, chills, chest pain, SOB, nausea, vomiting.     MEDICATIONS  (STANDING):  aspirin  chewable 81 milliGRAM(s) Oral daily  heparin   Injectable 5000 Unit(s) SubCutaneous every 8 hours  potassium phosphate / sodium phosphate Powder (PHOS-NaK) 2 Packet(s) Oral two times a day  saccharomyces boulardii 250 milliGRAM(s) Oral two times a day    MEDICATIONS  (PRN):  acetaminophen     Tablet .. 650 milliGRAM(s) Oral every 6 hours PRN Temp greater or equal to 38C (100.4F), Mild Pain (1 - 3)  melatonin 3 milliGRAM(s) Oral at bedtime PRN Insomnia      Allergies  clindamycin (Unknown)  Levaquin (Unknown)      REVIEW OF SYSTEMS:  CONSTITUTIONAL: No fever or chills  HEENT:  No headache, no sore throat  RESPIRATORY: No cough, wheezing, or shortness of breath  CARDIOVASCULAR: No chest pain, palpitations  GASTROINTESTINAL: No abd pain, nausea, vomiting, or diarrhea  GENITOURINARY: No dysuria, frequency, or hematuria  NEUROLOGICAL: No focal weakness or dizziness  MUSCULOSKELETAL: No myalgias     Vital Signs Last 24 Hrs  T(C): 36.8 (28 Nov 2021 14:45), Max: 36.8 (27 Nov 2021 15:15)  T(F): 98.2 (28 Nov 2021 14:45), Max: 98.2 (27 Nov 2021 15:15)  HR: 95 (28 Nov 2021 14:45) (82 - 95)  BP: 114/67 (28 Nov 2021 14:45) (112/65 - 122/68)  RR: 17 (28 Nov 2021 14:45) (16 - 19)  SpO2: 97% (28 Nov 2021 14:45) (95% - 97%)    PHYSICAL EXAM:  GENERAL: NAD  HEENT:  anicteric, moist mucous membranes  CHEST/LUNG:  CTA b/l, no rales, wheezes, or rhonchi  HEART:  RRR, S1, S2  ABDOMEN:  BS+, soft, +guarding, nondistended  EXTREMITIES: no edema, cyanosis, or calf tenderness  NERVOUS SYSTEM: answers questions and follows commands appropriately    LABS:                        10.7   8.16  )-----------( 231      ( 28 Nov 2021 08:48 )             34.2     CBC Full  -  ( 28 Nov 2021 08:48 )  WBC Count : 8.16 K/uL  Hemoglobin : 10.7 g/dL  Hematocrit : 34.2 %  Platelet Count - Automated : 231 K/uL  Mean Cell Volume : 96.9 fl  Mean Cell Hemoglobin : 30.3 pg  Mean Cell Hemoglobin Concentration : 31.3 gm/dL  Auto Neutrophil # : 4.68 K/uL  Auto Lymphocyte # : 0.91 K/uL  Auto Monocyte # : 1.41 K/uL  Auto Eosinophil # : 0.59 K/uL  Auto Basophil # : 0.03 K/uL  Auto Neutrophil % : 57.3 %  Auto Lymphocyte % : 11.2 %  Auto Monocyte % : 17.3 %  Auto Eosinophil % : 7.2 %  Auto Basophil % : 0.4 %    28 Nov 2021 08:48    142    |  112    |  18     ----------------------------<  88     3.7     |  26     |  1.80     Ca    9.2        28 Nov 2021 08:48  Phos  2.3       28 Nov 2021 08:48  Mg     1.9       28 Nov 2021 08:48    TPro  6.5    /  Alb  2.7    /  TBili  0.3    /  DBili  x      /  AST  22     /  ALT  17     /  AlkPhos  39     28 Nov 2021 08:48      GI PCR Panel, Stool (collected 11-27-21 @ 11:58)  Source: .Stool Feces  Final Report (11-27-21 @ 14:15):    Cryptosporidium species    DETECTED by PCR    *******Please Note:*******    GI panel PCR evaluates for:    Campylobacter, Plesiomonas shigelloides, Salmonella,    Vibrio, Yersinia enterocolitica, Enteroaggregative    Escherichia coli (EAEC), Enteropathogenic E.coli (EPEC),    Enterotoxigenic E. coli (ETEC) lt/st, Shiga-like    toxin-producing E. coli (STEC) stx1/stx2,    Shigella/ Enteroinvasive E. coli (EIEC), Cryptosporidium,    Cyclospora cayetanensis, Entamoeba histolytica,    Giardia lamblia, Adenovirus F 40/41, Astrovirus,    Norovirus GI/GII, Rotavirus A, Sapovirus    Culture - Blood (collected 11-25-21 @ 01:15)  Source: .Blood Blood-Peripheral  Preliminary Report (11-26-21 @ 02:02):    No growth to date.    Culture - Blood (collected 11-25-21 @ 01:15)  Source: .Blood Blood-Peripheral  Preliminary Report (11-26-21 @ 02:02):    No growth to date.    Culture - Urine (collected 11-25-21 @ 00:53)  Source: Clean Catch Clean Catch (Midstream)  Final Report (11-26-21 @ 13:15):    <10,000 CFU/mL Normal Urogenital Dalila      RADIOLOGY & ADDITIONAL TESTS: No new imaging.     Consultant(s) Notes Reviewed:  [x] YES  [ ] NO     Patient is a 89y old  Male who presents with a chief complaint of fall.      INTERVAL HPI/OVERNIGHT EVENTS: Patient seen and evaluated at the bedside. No acute overnight events reported. Patient denies further episodes of diarrhea, also denies abdominal pain. He is tolerating a full liquid diet. Denies fevers, chills, chest pain, SOB, nausea, vomiting.     MEDICATIONS  (STANDING):  aspirin  chewable 81 milliGRAM(s) Oral daily  heparin   Injectable 5000 Unit(s) SubCutaneous every 8 hours  potassium phosphate / sodium phosphate Powder (PHOS-NaK) 2 Packet(s) Oral two times a day  saccharomyces boulardii 250 milliGRAM(s) Oral two times a day    MEDICATIONS  (PRN):  acetaminophen     Tablet .. 650 milliGRAM(s) Oral every 6 hours PRN Temp greater or equal to 38C (100.4F), Mild Pain (1 - 3)  melatonin 3 milliGRAM(s) Oral at bedtime PRN Insomnia      Allergies  clindamycin (Unknown)  Levaquin (Unknown)      REVIEW OF SYSTEMS:  CONSTITUTIONAL: No fever or chills  HEENT:  No headache, no sore throat  RESPIRATORY: No cough, wheezing, or shortness of breath  CARDIOVASCULAR: No chest pain, palpitations  GASTROINTESTINAL: abd pain resolved, no nausea, vomiting, or diarrhea  GENITOURINARY: No dysuria, frequency, or hematuria  NEUROLOGICAL: No focal weakness or dizziness  MUSCULOSKELETAL: No myalgias     Vital Signs Last 24 Hrs  T(C): 36.8 (28 Nov 2021 14:45), Max: 36.8 (27 Nov 2021 15:15)  T(F): 98.2 (28 Nov 2021 14:45), Max: 98.2 (27 Nov 2021 15:15)  HR: 95 (28 Nov 2021 14:45) (82 - 95)  BP: 114/67 (28 Nov 2021 14:45) (112/65 - 122/68)  RR: 17 (28 Nov 2021 14:45) (16 - 19)  SpO2: 97% (28 Nov 2021 14:45) (95% - 97%)    PHYSICAL EXAM:  GENERAL: NAD  HEENT:  anicteric, moist mucous membranes  CHEST/LUNG:  CTA b/l, no rales, wheezes, or rhonchi  HEART:  RRR, S1, S2  ABDOMEN:  BS+, soft, mild LLQ TTP without guarding, nondistended  EXTREMITIES: trace LE edema b/l, no cyanosis or calf tenderness  NERVOUS SYSTEM: answers questions and follows commands appropriately    LABS:                        10.7   8.16  )-----------( 231      ( 28 Nov 2021 08:48 )             34.2     CBC Full  -  ( 28 Nov 2021 08:48 )  WBC Count : 8.16 K/uL  Hemoglobin : 10.7 g/dL  Hematocrit : 34.2 %  Platelet Count - Automated : 231 K/uL  Mean Cell Volume : 96.9 fl  Mean Cell Hemoglobin : 30.3 pg  Mean Cell Hemoglobin Concentration : 31.3 gm/dL  Auto Neutrophil # : 4.68 K/uL  Auto Lymphocyte # : 0.91 K/uL  Auto Monocyte # : 1.41 K/uL  Auto Eosinophil # : 0.59 K/uL  Auto Basophil # : 0.03 K/uL  Auto Neutrophil % : 57.3 %  Auto Lymphocyte % : 11.2 %  Auto Monocyte % : 17.3 %  Auto Eosinophil % : 7.2 %  Auto Basophil % : 0.4 %    28 Nov 2021 08:48    142    |  112    |  18     ----------------------------<  88     3.7     |  26     |  1.80     Ca    9.2        28 Nov 2021 08:48  Phos  2.3       28 Nov 2021 08:48  Mg     1.9       28 Nov 2021 08:48    TPro  6.5    /  Alb  2.7    /  TBili  0.3    /  DBili  x      /  AST  22     /  ALT  17     /  AlkPhos  39     28 Nov 2021 08:48      GI PCR Panel, Stool (collected 11-27-21 @ 11:58)  Source: .Stool Feces  Final Report (11-27-21 @ 14:15):    Cryptosporidium species    DETECTED by PCR    *******Please Note:*******    GI panel PCR evaluates for:    Campylobacter, Plesiomonas shigelloides, Salmonella,    Vibrio, Yersinia enterocolitica, Enteroaggregative    Escherichia coli (EAEC), Enteropathogenic E.coli (EPEC),    Enterotoxigenic E. coli (ETEC) lt/st, Shiga-like    toxin-producing E. coli (STEC) stx1/stx2,    Shigella/ Enteroinvasive E. coli (EIEC), Cryptosporidium,    Cyclospora cayetanensis, Entamoeba histolytica,    Giardia lamblia, Adenovirus F 40/41, Astrovirus,    Norovirus GI/GII, Rotavirus A, Sapovirus    Culture - Blood (collected 11-25-21 @ 01:15)  Source: .Blood Blood-Peripheral  Preliminary Report (11-26-21 @ 02:02):    No growth to date.    Culture - Blood (collected 11-25-21 @ 01:15)  Source: .Blood Blood-Peripheral  Preliminary Report (11-26-21 @ 02:02):    No growth to date.    Culture - Urine (collected 11-25-21 @ 00:53)  Source: Clean Catch Clean Catch (Midstream)  Final Report (11-26-21 @ 13:15):    <10,000 CFU/mL Normal Urogenital Dalila      RADIOLOGY & ADDITIONAL TESTS: No new imaging.     Consultant(s) Notes Reviewed:  [x] YES  [ ] NO

## 2021-11-28 NOTE — PROGRESS NOTE ADULT - PROBLEM SELECTOR PLAN 3
- Overnight on tele 11/26, the tele tech read strips as patient having possible alternating afib and NSR @80's-90's.  - cardio reviewed strips and there was no afib detected, just sinus arrhythmia  - EKG STAT -> NSR w/ t-wave inversions in V4-V6, RBBB, S1Q3T3 pattern  - d-dimer elevated: 764  - US LE dopplers to r/o DVT -> negative for DVT  - VQ scan urgent to r/o PE -- very low probability for PE  - TTE pending, f/u   - c/w tele monitoring  - Cardiology consulted (Dr. Gomez), recs appreciated  - Patient reports he used to see PCP Dr. Montes De Oca, will reach out to his office Monday for records - Overnight on tele 11/26, the tele tech read strips as patient having possible alternating afib and NSR @80's-90's.  - cardio reviewed strips and there was no afib detected, just sinus arrhythmia  - Added ASA for remote history of stent   - EKG STAT -> NSR w/ t-wave inversions in V4-V6, RBBB, S1Q3T3 pattern  - d-dimer elevated: 764  - US LE dopplers to r/o DVT -> negative for DVT  - VQ scan urgent to r/o PE -- very low probability for PE  - TTE pending, f/u   - c/w tele monitoring  - Cardiology consulted (Dr. Gomez), recs appreciated  - Patient reports he used to see PCP Dr. Montes De Oca, will reach out to his office Monday for records - Overnight, on tele 11/26, the tele tech read strips as patient having possible alternating afib and NSR @80's-90's.  - cardio reviewed strips and there was no afib detected, just sinus arrhythmia  - Added ASA for remote history of stent   - EKG STAT -> NSR w/ t-wave inversions in V4-V6, RBBB, S1Q3T3 pattern  - d-dimer elevated: 764  - US LE dopplers to r/o DVT -> negative for DVT  - VQ scan urgent to r/o PE -- very low probability for PE  - TTE relatively normal  - Cardiology consulted (Dr. Gomez), recs appreciated  - Patient reports he used to see PCP Dr. Montes De Oca, will reach out to his office Monday for records

## 2021-11-28 NOTE — PROGRESS NOTE ADULT - ASSESSMENT
89yo M w/ PMHx of Parkinson's Disease (on no medications), CAD (s/p stent), Bladder cancer (s/p tumor resection) presented to the ED after a fall, a/w sigmoid colitis and possible SMITH. 89yo M w/ PMHx of Parkinson's Disease (on no medications), CAD (s/p stent), Bladder cancer (s/p tumor resection) presented to the ED after a fall, a/w sigmoid colitis and suspected SMITH on CKD.

## 2021-11-28 NOTE — DISCHARGE NOTE PROVIDER - PROVIDER TOKENS
PROVIDER:[TOKEN:[[id number]],FOLLOWUP:[1 week]],PROVIDER:[TOKEN:[[id number]]],FREE:[LAST:[Bray],FIRST:[Alphonso],PHONE:[(   )    -],FAX:[(   )    -]] PROVIDER:[TOKEN:[[id number]]],PROVIDER:[TOKEN:[[id number]]],PROVIDER:[TOKEN:[[id number]]],PROVIDER:[TOKEN:[[id number]]]

## 2021-11-28 NOTE — PROGRESS NOTE ADULT - PROBLEM SELECTOR PLAN 1
- met sepsis criteria on admit, likely due to colitis, resolved   - CT scan -> Mid to distal sigmoid colitis which is infectious, inflammatory or ischemic in etiology  - GI PCR came back this afternoon as positive for cryptosporidium species  - diarrhea improving per RN and pt  - discussed with ID (Dr. Anderson); recs appreciated -- continue to monitor off abx as cryptosporidium infectious diarrhea typically self-limited   - advance diet to full low-fiber   - c/w BCx x 2 - NGTD - met sepsis criteria on admit, likely due to colitis, resolved   - CT scan -> Mid to distal sigmoid colitis which is infectious, inflammatory or ischemic in etiology  - GI PCR positive for cryptosporidium species  - diarrhea improving per RN and pt  - discussed with ID (Dr. Anderson); recs appreciated -- discontinue Abx and will monitor off abx as cryptosporidium infectious diarrhea typically self-limited   - advance diet to solids low-fiber   - BCx x 2 - NGTD

## 2021-11-28 NOTE — PROGRESS NOTE ADULT - SUBJECTIVE AND OBJECTIVE BOX
Patient is a 89y old  Male who presents with a chief complaint of Sigmoid Colitis (25 Nov 2021 10:57)       HPI:  89 yo M w/ PMHx/ Parkinson's Disease (on no medications), CAD (s/p stent), Bladder cancer (s/p tumor resection) presented to the ED after a fall. Patient was sitting on a rolling chair and fell over. Patient states that he remembers the fall - no dizziness or prodromal symptoms. States that he lost his balance and fell backwards. However, wife at bedside states that she went to go see him and found him on the floor which is when she called 911 and was brought the the ED. Patient does not recall if he hit his head, or LOC but states that after he felt dizziness. Patient's CT abdomen showed sigmoid colitis however patient not complaining of N/V/D but came in with a temperature of 103. Patient denies dizziness at this point, CP or SOB   Of note, Patient lives with wife, and independent at baseline. Patient is not vaccinated for COVID-19 or influenza.  Patient is poor historian.  Denies SOB/N/V.  States he is urinating. Unsure if he has seen nephrologist in the past.    No new complaints; fatigued    PAST MEDICAL & SURGICAL HISTORY:  Shoulder fracture    Bladder cancer    Stented coronary artery  over 15 years ago as per patient    Parkinsons    S/P cardiac catheterization         FAMILY HISTORY:  FH: HTN (hypertension)    NC    Social History:Non smoker    MEDICATIONS  (STANDING):  cefTRIAXone   IVPB 1000 milliGRAM(s) IV Intermittent every 24 hours  heparin   Injectable 5000 Unit(s) SubCutaneous every 12 hours  lactated ringers. 1000 milliLiter(s) (75 mL/Hr) IV Continuous <Continuous>  metroNIDAZOLE  IVPB 500 milliGRAM(s) IV Intermittent every 8 hours  potassium phosphate / sodium phosphate Powder (PHOS-NaK) 1 Packet(s) Oral every 6 hours  saccharomyces boulardii 250 milliGRAM(s) Oral two times a day    MEDICATIONS  (PRN):  acetaminophen     Tablet .. 650 milliGRAM(s) Oral every 6 hours PRN Temp greater or equal to 38C (100.4F), Mild Pain (1 - 3)  melatonin 3 milliGRAM(s) Oral at bedtime PRN Insomnia   Meds reviewed    Allergies    clindamycin (Unknown)  Levaquin (Unknown)    Intolerances         REVIEW OF SYSTEMS:    Constitutional: +fatigue  HEENT: Denies headaches and dizziness  Respiratory: denies SOB, cough, or wheezing  Cardiovascular: denies CP, palpitations  Gastrointestinal: Denies nausea, denies vomiting, diarrhea, constipation, abdominal pain, or bloody stools  Genitourinary: denies painful urination, increased frequency, urgency, or bloody urine  Skin: denies rashes or itching  Musculoskeletal: denies muscle aches, joint swelling  Neurologic: Denies generalized weakness, denies loss of sensation, numbness, or tingling    Vital Signs Last 24 Hrs  T(C): 36.6 (28 Nov 2021 05:14), Max: 36.8 (27 Nov 2021 15:15)  T(F): 97.9 (28 Nov 2021 05:14), Max: 98.2 (27 Nov 2021 15:15)  HR: 86 (28 Nov 2021 05:14) (82 - 86)  BP: 112/65 (28 Nov 2021 05:14) (112/65 - 122/68)  BP(mean): --  RR: 18 (28 Nov 2021 05:14) (18 - 19)  SpO2: 95% (28 Nov 2021 05:14) (95% - 97%)    PHYSICAL EXAM:    GENERAL: NAD  HEAD:  Atraumatic, Normocephalic  EYES: EOMI, conjunctiva and sclera clear, [place]  ENMT: No Drainage from nares, No drainage from ears  NECK: Supple, neck  veins full  NERVOUS SYSTEM:  Awake and Alert  CHEST/LUNG: Clear to percussion bilaterally; No rales, rhonchi, wheezing, or rubs  HEART: Regular rate and rhythm; No murmurs, rubs, or gallops  ABDOMEN: Soft, Nontender, Nondistended; Bowel sounds present  EXTREMITIES:  No Edema  SKIN: No rashes No obvious ecchymosis      LABS:                        10.7   8.16  )-----------( 231      ( 28 Nov 2021 08:48 )             34.2     11-28    142  |  112<H>  |  18  ----------------------------<  88  3.7   |  26  |  1.80<H>    Ca    9.2      28 Nov 2021 08:48  Phos  2.3     11-28  Mg     1.9     11-28    TPro  6.5  /  Alb  2.7<L>  /  TBili  0.3  /  DBili  x   /  AST  22  /  ALT  17  /  AlkPhos  39<L>  11-28

## 2021-11-28 NOTE — DISCHARGE NOTE PROVIDER - CARE PROVIDER_API CALL
Terell Montes De Oca)  Internal Medicine  [address] [place] [address]  Phone: [phone]  Fax: [phone]  Follow Up Time: 1 week    Fahad Camp)  Urology  [address], [address]  [place] [address]  Phone: [phone]  Fax: [phone]  Follow Up Time:     Alphonso Bray  Phone: (   )    -  Fax: (   )    -  Follow Up Time:    Terell Montes De Oca)  Internal Medicine  [address] [place] [address]  Phone: [phone]  Fax: [phone]  Follow Up Time:     Fahad Camp)  Urology  [address], [address]  [place] [address]  Phone: [phone]  Fax: [phone]  Follow Up Time:     Ramon Bray)  Nephrology  [address], [address]  [place] [address]  Phone: [phone]  Fax: [phone]  Follow Up Time:     Charanjit Cabezas (DO)  Family Medicine  [address], [address]  [place] [address]  Phone: [phone]  Fax: [phone]  Follow Up Time:

## 2021-11-29 DIAGNOSIS — I25.10 ATHEROSCLEROTIC HEART DISEASE OF NATIVE CORONARY ARTERY WITHOUT ANGINA PECTORIS: ICD-10-CM

## 2021-11-29 LAB — SARS-COV-2 RNA SPEC QL NAA+PROBE: SIGNIFICANT CHANGE UP

## 2021-11-29 PROCEDURE — 99232 SBSQ HOSP IP/OBS MODERATE 35: CPT

## 2021-11-29 PROCEDURE — 99233 SBSQ HOSP IP/OBS HIGH 50: CPT | Mod: GC

## 2021-11-29 RX ORDER — LIDOCAINE 4 G/100G
1 CREAM TOPICAL EVERY 24 HOURS
Refills: 0 | Status: DISCONTINUED | OUTPATIENT
Start: 2021-11-29 | End: 2021-11-30

## 2021-11-29 RX ORDER — TAMSULOSIN HYDROCHLORIDE 0.4 MG/1
0.4 CAPSULE ORAL AT BEDTIME
Refills: 0 | Status: DISCONTINUED | OUTPATIENT
Start: 2021-11-29 | End: 2021-11-30

## 2021-11-29 RX ORDER — POTASSIUM PHOSPHATE, MONOBASIC POTASSIUM PHOSPHATE, DIBASIC 236; 224 MG/ML; MG/ML
15 INJECTION, SOLUTION INTRAVENOUS ONCE
Refills: 0 | Status: COMPLETED | OUTPATIENT
Start: 2021-11-29 | End: 2021-11-29

## 2021-11-29 RX ADMIN — Medication 81 MILLIGRAM(S): at 11:41

## 2021-11-29 RX ADMIN — HEPARIN SODIUM 5000 UNIT(S): 5000 INJECTION INTRAVENOUS; SUBCUTANEOUS at 13:17

## 2021-11-29 RX ADMIN — Medication 250 MILLIGRAM(S): at 16:59

## 2021-11-29 RX ADMIN — Medication 250 MILLIGRAM(S): at 06:14

## 2021-11-29 RX ADMIN — Medication 2 PACKET(S): at 06:14

## 2021-11-29 RX ADMIN — HEPARIN SODIUM 5000 UNIT(S): 5000 INJECTION INTRAVENOUS; SUBCUTANEOUS at 06:14

## 2021-11-29 RX ADMIN — LIDOCAINE 1 PATCH: 4 CREAM TOPICAL at 22:11

## 2021-11-29 RX ADMIN — HEPARIN SODIUM 5000 UNIT(S): 5000 INJECTION INTRAVENOUS; SUBCUTANEOUS at 22:10

## 2021-11-29 RX ADMIN — TAMSULOSIN HYDROCHLORIDE 0.4 MILLIGRAM(S): 0.4 CAPSULE ORAL at 22:11

## 2021-11-29 RX ADMIN — POTASSIUM PHOSPHATE, MONOBASIC POTASSIUM PHOSPHATE, DIBASIC 62.5 MILLIMOLE(S): 236; 224 INJECTION, SOLUTION INTRAVENOUS at 11:41

## 2021-11-29 NOTE — PROGRESS NOTE ADULT - ASSESSMENT
89yo M w/ PMHx of Parkinson's Disease (on no medications), CAD (s/p stent), Bladder cancer (s/p tumor resection) presented to the ED after a fall, a/w sigmoid colitis and suspected SMITH on CKD.

## 2021-11-29 NOTE — PROGRESS NOTE ADULT - PROBLEM SELECTOR PLAN 9
VTE ppx: HSQ  - fall/aspiration precautions  - GOC discussed with patient: States that he has not thought of this before but had been meaning to discuss with his family. Would like to make a family decision. Full Code for now.
VTE ppx: HSQ  - fall/aspiration precautions  - GOC discussed with patient: States that he has not thought of this before but had been meaning to discuss with his family. Would like to make a family decision. Full Code for now.

## 2021-11-29 NOTE — PROGRESS NOTE ADULT - PROBLEM SELECTOR PLAN 5
- unknown baseline, 11.7 on admit, stable  - iron studies consistent with anemia of chronic disease  - B12, folate WNL

## 2021-11-29 NOTE — PROGRESS NOTE ADULT - PROBLEM SELECTOR PLAN 2
- Patient came in after a fall from chair. Patient states that he lost balance and fell backward but does not remember details of what happened after. Mechanical fall vs. syncope.   - CT head showed no acute pathology - lateral ventricles dilated out of proportion to the sulcal prominence which could be due to central atrophy versus normal pressure hydrocephalus, but could also be due to atrophy  - orthostatic vital signs -- negative  - Fall precautious   - Neurology consulted - Dr. Rudolph - recs appreciated   - PT -> ALEXIA on re-evaluation - Patient came in after a fall from chair. Patient states that he lost balance and fell backward, but does not remember details of what happened after. Mechanical fall vs. syncope.   - CT head showed no acute pathology - lateral ventricles dilated out of proportion to the sulcal prominence which could be due to central atrophy versus normal pressure hydrocephalus, but could also be due to atrophy  - orthostatic vital signs -- negative  - Fall precautious   - Neurology consulted - Dr. Rudolph - recs appreciated   - PT -> ALEXIA on re-evaluation

## 2021-11-29 NOTE — PROGRESS NOTE ADULT - SUBJECTIVE AND OBJECTIVE BOX
Patient is a 89y old  Male who presents with a chief complaint of Sigmoid Colitis (29 Nov 2021 13:20)      INTERVAL HPI/OVERNIGHT EVENTS: Patient seen and evaluated at the bedside. No acute overnight events reported. Patient denies diarrhea, also denies abdominal pain. He is tolerating a regular DASH/TLC diet. Denies fevers, chills, chest pain, SOB, nausea, vomiting.     MEDICATIONS  (STANDING):  aspirin  chewable 81 milliGRAM(s) Oral daily  heparin   Injectable 5000 Unit(s) SubCutaneous every 8 hours  saccharomyces boulardii 250 milliGRAM(s) Oral two times a day  tamsulosin 0.4 milliGRAM(s) Oral at bedtime    MEDICATIONS  (PRN):  acetaminophen     Tablet .. 650 milliGRAM(s) Oral every 6 hours PRN Temp greater or equal to 38C (100.4F), Mild Pain (1 - 3)  melatonin 3 milliGRAM(s) Oral at bedtime PRN Insomnia      Allergies    clindamycin (Unknown)  Levaquin (Unknown)    Intolerances        REVIEW OF SYSTEMS:  CONSTITUTIONAL: No fever or chills  HEENT:  No headache, no sore throat  RESPIRATORY: No cough, wheezing, or shortness of breath  CARDIOVASCULAR: No chest pain, palpitations  GASTROINTESTINAL: no abdominal pain, no nausea, vomiting, or diarrhea  GENITOURINARY: No dysuria, frequency, or hematuria  NEUROLOGICAL: No focal weakness or dizziness  MUSCULOSKELETAL: No myalgias     Vital Signs Last 24 Hrs  T(C): 36.9 (29 Nov 2021 13:00), Max: 37.2 (29 Nov 2021 04:30)  T(F): 98.4 (29 Nov 2021 13:00), Max: 98.9 (29 Nov 2021 04:30)  HR: 90 (29 Nov 2021 13:00) (78 - 95)  BP: 103/65 (29 Nov 2021 13:00) (100/65 - 126/76)  BP(mean): --  RR: 17 (29 Nov 2021 13:00) (17 - 19)  SpO2: 95% (29 Nov 2021 13:00) (92% - 97%)    PHYSICAL EXAM:  GENERAL: NAD  HEENT:  anicteric, moist mucous membranes  CHEST/LUNG:  CTA b/l, no rales, wheezes, or rhonchi  HEART:  RRR, S1, S2  ABDOMEN:  BS+, soft, nondistended, nondistended  EXTREMITIES: trace LE edema b/l, no cyanosis or calf tenderness  NERVOUS SYSTEM: answers questions and follows commands appropriately    LABS:    CBC Full  -  ( 28 Nov 2021 08:48 )  WBC Count : 8.16 K/uL  Hemoglobin : 10.7 g/dL  Hematocrit : 34.2 %  Platelet Count - Automated : 231 K/uL  Mean Cell Volume : 96.9 fl  Mean Cell Hemoglobin : 30.3 pg  Mean Cell Hemoglobin Concentration : 31.3 gm/dL  Auto Neutrophil # : 4.68 K/uL  Auto Lymphocyte # : 0.91 K/uL  Auto Monocyte # : 1.41 K/uL  Auto Eosinophil # : 0.59 K/uL  Auto Basophil # : 0.03 K/uL  Auto Neutrophil % : 57.3 %  Auto Lymphocyte % : 11.2 %  Auto Monocyte % : 17.3 %  Auto Eosinophil % : 7.2 %  Auto Basophil % : 0.4 %      Ca    9.2        28 Nov 2021 08:48          CAPILLARY BLOOD GLUCOSE            GI PCR Panel, Stool (collected 11-27-21 @ 11:58)  Source: .Stool Feces  Final Report (11-27-21 @ 14:15):    Cryptosporidium species    DETECTED by PCR    *******Please Note:*******    GI panel PCR evaluates for:    Campylobacter, Plesiomonas shigelloides, Salmonella,    Vibrio, Yersinia enterocolitica, Enteroaggregative    Escherichia coli (EAEC), Enteropathogenic E.coli (EPEC),    Enterotoxigenic E. coli (ETEC) lt/st, Shiga-like    toxin-producing E. coli (STEC) stx1/stx2,    Shigella/ Enteroinvasive E. coli (EIEC), Cryptosporidium,    Cyclospora cayetanensis, Entamoeba histolytica,    Giardia lamblia, Adenovirus F 40/41, Astrovirus,    Norovirus GI/GII, Rotavirus A, Sapovirus    Culture - Blood (collected 11-25-21 @ 01:15)  Source: .Blood Blood-Peripheral  Preliminary Report (11-26-21 @ 02:02):    No growth to date.    Culture - Blood (collected 11-25-21 @ 01:15)  Source: .Blood Blood-Peripheral  Preliminary Report (11-26-21 @ 02:02):    No growth to date.    Culture - Urine (collected 11-25-21 @ 00:53)  Source: Clean Catch Clean Catch (Midstream)  Final Report (11-26-21 @ 13:15):    <10,000 CFU/mL Normal Urogenital Dalila        RADIOLOGY & ADDITIONAL TESTS:    Personally reviewed.     Consultant(s) Notes Reviewed:  [x] YES  [ ] NO       Patient is a 89y old  Male who presents with a chief complaint of fall.      INTERVAL HPI/OVERNIGHT EVENTS: Patient seen and evaluated at the bedside. No acute overnight events reported. Patient denies diarrhea, also denies abdominal pain. He is tolerating a regular DASH/TLC diet. Denies fevers, chills, chest pain, SOB, nausea, vomiting.     MEDICATIONS  (STANDING):  aspirin  chewable 81 milliGRAM(s) Oral daily  heparin   Injectable 5000 Unit(s) SubCutaneous every 8 hours  saccharomyces boulardii 250 milliGRAM(s) Oral two times a day  tamsulosin 0.4 milliGRAM(s) Oral at bedtime    MEDICATIONS  (PRN):  acetaminophen     Tablet .. 650 milliGRAM(s) Oral every 6 hours PRN Temp greater or equal to 38C (100.4F), Mild Pain (1 - 3)  melatonin 3 milliGRAM(s) Oral at bedtime PRN Insomnia      Allergies    clindamycin (Unknown)  Levaquin (Unknown)    Intolerances        REVIEW OF SYSTEMS:  CONSTITUTIONAL: No fever or chills  HEENT:  No headache, no sore throat  RESPIRATORY: No cough, wheezing, or shortness of breath  CARDIOVASCULAR: No chest pain, palpitations  GASTROINTESTINAL: no abdominal pain, no nausea, vomiting, or diarrhea  GENITOURINARY: +difficulty to start urinating (states has been chronic issue); No dysuria, frequency, or hematuria  NEUROLOGICAL: No focal weakness or dizziness  MUSCULOSKELETAL: No myalgias ; +chronic low back pain    Vital Signs Last 24 Hrs  T(C): 36.9 (29 Nov 2021 13:00), Max: 37.2 (29 Nov 2021 04:30)  T(F): 98.4 (29 Nov 2021 13:00), Max: 98.9 (29 Nov 2021 04:30)  HR: 90 (29 Nov 2021 13:00) (78 - 95)  BP: 103/65 (29 Nov 2021 13:00) (100/65 - 126/76)  BP(mean): --  RR: 17 (29 Nov 2021 13:00) (17 - 19)  SpO2: 95% (29 Nov 2021 13:00) (92% - 97%)    PHYSICAL EXAM:  GENERAL: NAD  HEENT:  anicteric, moist mucous membranes  CHEST/LUNG:  CTA b/l, no rales, wheezes, or rhonchi  HEART:  RRR, S1, S2  ABDOMEN:  BS+, soft, nondistended, nondistended  EXTREMITIES: trace LE edema b/l, no cyanosis or calf tenderness  NERVOUS SYSTEM: answers questions and follows commands appropriately    LABS:                          10.7   8.16  )-----------( 231      ( 28 Nov 2021 08:48 )             34.2     CBC Full  -  ( 28 Nov 2021 08:48 )  WBC Count : 8.16 K/uL  Hemoglobin : 10.7 g/dL  Hematocrit : 34.2 %  Platelet Count - Automated : 231 K/uL  Mean Cell Volume : 96.9 fl  Mean Cell Hemoglobin : 30.3 pg  Mean Cell Hemoglobin Concentration : 31.3 gm/dL  Auto Neutrophil # : 4.68 K/uL  Auto Lymphocyte # : 0.91 K/uL  Auto Monocyte # : 1.41 K/uL  Auto Eosinophil # : 0.59 K/uL  Auto Basophil # : 0.03 K/uL  Auto Neutrophil % : 57.3 %  Auto Lymphocyte % : 11.2 %  Auto Monocyte % : 17.3 %  Auto Eosinophil % : 7.2 %  Auto Basophil % : 0.4 %    11-28    142  |  112<H>  |  18  ----------------------------<  88  3.7   |  26  |  1.80<H>    Ca    9.2      28 Nov 2021 08:48  Phos  2.3     11-28  Mg     1.9     11-28    TPro  6.5  /  Alb  2.7<L>  /  TBili  0.3  /  DBili  x   /  AST  22  /  ALT  17  /  AlkPhos  39<L>  11-28            CAPILLARY BLOOD GLUCOSE            GI PCR Panel, Stool (collected 11-27-21 @ 11:58)  Source: .Stool Feces  Final Report (11-27-21 @ 14:15):    Cryptosporidium species    DETECTED by PCR    *******Please Note:*******    GI panel PCR evaluates for:    Campylobacter, Plesiomonas shigelloides, Salmonella,    Vibrio, Yersinia enterocolitica, Enteroaggregative    Escherichia coli (EAEC), Enteropathogenic E.coli (EPEC),    Enterotoxigenic E. coli (ETEC) lt/st, Shiga-like    toxin-producing E. coli (STEC) stx1/stx2,    Shigella/ Enteroinvasive E. coli (EIEC), Cryptosporidium,    Cyclospora cayetanensis, Entamoeba histolytica,    Giardia lamblia, Adenovirus F 40/41, Astrovirus,    Norovirus GI/GII, Rotavirus A, Sapovirus    Culture - Blood (collected 11-25-21 @ 01:15)  Source: .Blood Blood-Peripheral  Preliminary Report (11-26-21 @ 02:02):    No growth to date.    Culture - Blood (collected 11-25-21 @ 01:15)  Source: .Blood Blood-Peripheral  Preliminary Report (11-26-21 @ 02:02):    No growth to date.    Culture - Urine (collected 11-25-21 @ 00:53)  Source: Clean Catch Clean Catch (Midstream)  Final Report (11-26-21 @ 13:15):    <10,000 CFU/mL Normal Urogenital Dalila        RADIOLOGY & ADDITIONAL TESTS:    Personally reviewed.     Consultant(s) Notes Reviewed:  [x] YES  [ ] NO

## 2021-11-29 NOTE — PROGRESS NOTE ADULT - SUBJECTIVE AND OBJECTIVE BOX
[facility]  INFECTIOUS DISEASES   [address] [place] [address]  Tel: [phone]     Fax: [phone]  ======================================================  MD Ioana Villanueva Kaushal, MD Cho, Michelle, MD   ======================================================    [id number]  [facility]     Follow up; Sigmoid Colitis     Patient seen and examined and chart reviewed.   No more fever, diarrhea or abdominal pain.   Cultures negative.     PAST MEDICAL & SURGICAL HISTORY:  Shoulder fracture  Bladder cancer  Stented coronary artery  over 15 years ago as per patient  Parkinsons  S/P cardiac catheterization    Social Hx: no smoking, ETOH or drugs     FAMILY HISTORY:  FH: HTN (hypertension)    Allergies  clindamycin (Unknown)  Levaquin (Unknown)    Antibiotics:  cefTRIAXone   IVPB 1000 milliGRAM(s) IV Intermittent every 24 hours  metroNIDAZOLE  IVPB 500 milliGRAM(s) IV Intermittent every 8 hours     REVIEW OF SYSTEMS:  CONSTITUTIONAL:  No Fever or chills, weakness   HEENT:  No diplopia or blurred vision.  No sore throat or runny nose.  CARDIOVASCULAR:  No chest pain or SOB.  RESPIRATORY:  No cough, shortness of breath, PND or orthopnea.  GASTROINTESTINAL:  No nausea, vomiting or diarrhea. + abdominal discomfort  GENITOURINARY:  No dysuria, frequency or urgency. No Blood in urine  MUSCULOSKELETAL:  no joint aches, no muscle pain  SKIN:  No change in skin, hair or nails.  NEUROLOGIC:  No paresthesias, fasciculations, seizures or weakness.  PSYCHIATRIC:  No disorder of thought or mood.  ENDOCRINE:  No heat or cold intolerance, polyuria or polydipsia.  HEMATOLOGICAL:  No easy bruising or bleeding.     Physical Exam:  Vital Signs Last 24 Hrs  T(C): 37.2 (29 Nov 2021 04:30), Max: 37.2 (29 Nov 2021 04:30)  T(F): 98.9 (29 Nov 2021 04:30), Max: 98.9 (29 Nov 2021 04:30)  HR: 85 (29 Nov 2021 04:30) (78 - 95)  BP: 100/65 (29 Nov 2021 04:30) (100/65 - 126/76)  BP(mean): --  RR: 18 (29 Nov 2021 04:30) (16 - 19)  SpO2: 92% (29 Nov 2021 04:30) (92% - 97%)  GEN: NAD  HEENT: normocephalic and atraumatic. EOMI. PERRL.    NECK: Supple.  No lymphadenopathy   LUNGS: Clear to auscultation.  HEART: Regular rate and rhythm   ABDOMEN: Soft and nondistended.  Positive bowel sounds. mild lower abdominal tenderness   : No CVA tenderness  EXTREMITIES: Without any cyanosis, clubbing, rash, lesions or edema.  NEUROLOGIC: grossly intact.  PSYCHIATRIC: Appropriate affect .  SKIN: No rash     Labs:                        10.7   8.16  )-----------( 231      ( 28 Nov 2021 08:48 )             34.2     11-28    142  |  112<H>  |  18  ----------------------------<  88  3.7   |  26  |  1.80<H>    Ca    9.2      28 Nov 2021 08:48  Phos  2.3     11-28  Mg     1.9     11-28    TPro  6.5  /  Alb  2.7<L>  /  TBili  0.3  /  DBili  x   /  AST  22  /  ALT  17  /  AlkPhos  39<L>  11-28    GI PCR Panel, Stool (collected 11-27-21 @ 11:58)  Source: .Stool Feces  Final Report (11-27-21 @ 14:15):    Cryptosporidium species    DETECTED by PCR    *******Please Note:*******    GI panel PCR evaluates for:    Campylobacter, Plesiomonas shigelloides, Salmonella,    Vibrio, Yersinia enterocolitica, Enteroaggregative    Escherichia coli (EAEC), Enteropathogenic E.coli (EPEC),    Enterotoxigenic E. coli (ETEC) lt/st, Shiga-like    toxin-producing E. coli (STEC) stx1/stx2,    Shigella/ Enteroinvasive E. coli (EIEC), Cryptosporidium,    Cyclospora cayetanensis, Entamoeba histolytica,    Giardia lamblia, Adenovirus F 40/41, Astrovirus,    Norovirus GI/GII, Rotavirus A, Sapovirus    Culture - Blood (collected 11-25-21 @ 01:15)  Source: .Blood Blood-Peripheral    Culture - Blood (collected 11-25-21 @ 01:15)  Source: .Blood Blood-Peripheral    Culture - Urine (collected 11-25-21 @ 00:53)  Source: Clean Catch Clean Catch (Midstream)  Final Report (11-26-21 @ 13:15):    <10,000 CFU/mL Normal Urogenital Dalila    WBC Count: 8.16 K/uL (11-28-21 @ 08:48)  WBC Count: 10.72 K/uL (11-27-21 @ 07:30)  WBC Count: 10.93 K/uL (11-26-21 @ 07:46)  WBC Count: 10.36 K/uL (11-25-21 @ 05:13)  WBC Count: 11.48 K/uL (11-24-21 @ 22:27)    Creatinine, Serum: 1.80 mg/dL (11-28-21 @ 08:48)  Creatinine, Serum: 1.80 mg/dL (11-27-21 @ 17:34)  Creatinine, Serum: 1.80 mg/dL (11-27-21 @ 07:30)  Creatinine, Serum: 1.90 mg/dL (11-26-21 @ 07:46)  Creatinine, Serum: 1.90 mg/dL (11-25-21 @ 05:13)  Creatinine, Serum: 2.20 mg/dL (11-24-21 @ 22:27)    Ferritin, Serum: 334 ng/mL (11-26-21 @ 12:25)    COVID-19 PCR: NotDetec (11-29-21 @ 12:34)  COVID-19 PCR: NotDetec (11-24-21 @ 22:27)    All imaging and other data have been reviewed.  < from: CT Renal Stone Hunt (11.24.21 @ 23:16) >  IMPRESSION:  1. No nephrolithiasis, hydronephrosis or obstructive uropathy.  2. Mid to distal sigmoid colitis which is infectious, inflammatory or ischemic in etiology.    Assessment and Plan:   89 yo man with PMH of CAD, Parkinson's Disease and Bladder cancer s/p tumor resection was admitted after a fall. No LOC. He denies any symptoms except for weakness and mild lower abdominal pain. No nausea, vomiting, diarrhea or dysuria. Had some work up done, showed possible left sided colitis. Fall could be related to infectious process, since he had fever and colitis, will need to be ruled out for bacteremia.   UA negative  Tmax 103.1  Mild leukocytosis 10.4  CT with mid to distal sigmoid colitis   GI PCR with Cryptosporidium     1- Fever and colitis  - Blood culture x 2 NGTD  - UA and UC negative   - Tolerating regular diet   - Cryptosporidium in stool can cause diarrhea, fever and colitis seen in CT   - Watch off Antibitoics.     2- Fall and weakness   - Neurology consult noted   - Head CT noted  - Had a neg VQ scan and doppler     Will follow PRN, can be discharged from ID stand point.     Mario Anderson MD  Division of Infectious Diseases   Cell [phone] between 8am and 6pm   After 6pm and weekends please call ID service at [phone].

## 2021-11-29 NOTE — PROGRESS NOTE ADULT - ASSESSMENT
89 yo M w/ PMHx of Parkinson's Disease (on no medications), CAD (s/p stent), Bladder cancer (s/p tumor resection) presented to the ED after a fall admitted for work-up of fall and treatment for sigmoid colitis and SMITH. Cardiology consulted for arrhythmia.     Arrhythmia / CAD  - EKG on admission with NSR, RBBB, repeat unchanged  - No signs of active ischemia  - Continue Asa  - D-Ddimer elevated with neg dopplers  - v/q: Very low probability of PE  - Echo as above 11/27/21 normal systolic 60% trace MR mild AS  - No signs of significant volume overload.     - All other medical needs as per primary team.  - Other cardiovascular workup will depend on clinical course.    Teodora JONP-C  Cardiology NP  [id number] [phone]

## 2021-11-29 NOTE — PROGRESS NOTE ADULT - SUBJECTIVE AND OBJECTIVE BOX
[facility] -- Britany Stark, Raoul Ortiz, Jason Puckett Savella  Office # [phone]      Follow Up:    abnormal ekg  Subjective/Observations:   Seen at bedside, offers no complaints on ra denies chest pain dizziness palpitations     REVIEW OF SYSTEMS: All other review of systems is negative unless indicated above    PAST MEDICAL & SURGICAL HISTORY:  Shoulder fracture    Bladder cancer    Stented coronary artery  over 15 years ago as per patient    Parkinsons    S/P cardiac catheterization        MEDICATIONS  (STANDING):  aspirin  chewable 81 milliGRAM(s) Oral daily  heparin   Injectable 5000 Unit(s) SubCutaneous every 8 hours  saccharomyces boulardii 250 milliGRAM(s) Oral two times a day    MEDICATIONS  (PRN):  acetaminophen     Tablet .. 650 milliGRAM(s) Oral every 6 hours PRN Temp greater or equal to 38C (100.4F), Mild Pain (1 - 3)  melatonin 3 milliGRAM(s) Oral at bedtime PRN Insomnia      Allergies    clindamycin (Unknown)  Levaquin (Unknown)    Intolerances        Vital Signs Last 24 Hrs  T(C): 37.2 (29 Nov 2021 04:30), Max: 37.2 (29 Nov 2021 04:30)  T(F): 98.9 (29 Nov 2021 04:30), Max: 98.9 (29 Nov 2021 04:30)  HR: 85 (29 Nov 2021 04:30) (78 - 95)  BP: 100/65 (29 Nov 2021 04:30) (100/65 - 126/76)  BP(mean): --  RR: 18 (29 Nov 2021 04:30) (16 - 19)  SpO2: 92% (29 Nov 2021 04:30) (92% - 97%)    I&O's Summary    28 Nov 2021 07:01  -  29 Nov 2021 07:00  --------------------------------------------------------  IN: 0 mL / OUT: 750 mL / NET: -750 mL          PHYSICAL EXAM:  TELE: not on tele   Constitutional: NAD, awake and alert, well-developed  HEENT: Moist Mucous Membranes, Anicteric  Pulmonary: Non-labored, breath sounds are clear bilaterally, No wheezing, crackles or rhonchi  Cardiovascular: Regular, S1 and S2 nl, + murmur   Gastrointestinal: Bowel Sounds present, soft, nontender.   Lymph: No lymphadenopathy. No peripheral edema.  Skin: No visible rashes or ulcers.  Psych:  Mood & affect appropriate    LABS: All Labs Reviewed:                        10.7   8.16  )-----------( 231      ( 28 Nov 2021 08:48 )             34.2                         10.5   10.72 )-----------( 230      ( 27 Nov 2021 07:30 )             32.1     28 Nov 2021 08:48    142    |  112    |  18     ----------------------------<  88     3.7     |  26     |  1.80   27 Nov 2021 17:34    141    |  112    |  20     ----------------------------<  108    4.1     |  24     |  1.80   27 Nov 2021 07:30    140    |  109    |  22     ----------------------------<  104    3.1     |  23     |  1.80     Ca    9.2        28 Nov 2021 08:48  Ca    8.7        27 Nov 2021 17:34  Ca    8.6        27 Nov 2021 07:30  Phos  2.3       28 Nov 2021 08:48  Phos  3.4       27 Nov 2021 17:34  Phos  1.5       27 Nov 2021 07:30  Mg     1.9       28 Nov 2021 08:48    TPro  6.5    /  Alb  2.7    /  TBili  0.3    /  DBili  x      /  AST  22     /  ALT  17     /  AlkPhos  39     28 Nov 2021 08:48    12 Lead ECG:   Ventricular Rate 80 BPM  Atrial Rate 80 BPM  P-R Interval 168 ms  QRS Duration 140 ms  Q-T Interval 416 ms  QTC Calculation(Bazett) 479 ms  P Axis 11 degrees  R Axis -27 degrees  T Axis -6 degrees  Diagnosis Line Normal sinus rhythm with sinus arrhythmia  Right bundle branch block  Inferior infarct (cited on or before 24-NOV-2021)  Abnormal ECG  When compared with ECG of 24-NOV-2021 22:14,  No significant change was found  Confirmed by STEPHEN VERA ([id number]) on 11/26/2021 6:11:40 PM (11-26-21 @ 10:56)    < from: NM Pulmonary Ventilation/Perfusion Scan (11.26.21 @ 16:07) >  EXAM:  NM PULM VENTILATION PERFUS IMG                        PROCEDURE DATE:  11/26/2021    INTERPRETATION:  RADIOPHARMACEUTICAL: 1 mCi Tc-99m-DTPA aerosol by inhalation; 6 mCi Tc-99m-MAA, I.V.  CLINICAL STATEMENT: 89-year-old male withhistory of syncope and elevated d-dimer.  TECHNIQUE:  Ventilation and perfusion images of the lungs were obtained following administration of Tc-99m-DTPA and Tc-99m-MAA. Images were obtained in the anterior, posterior, both lateral, and all 4 oblique projections. The study was interpreted in conjunction with chest radiograph of 11/26/2021.  No prior VQ scan.  FINDINGS: There is heterogeneous tracer distribution in the lungs, on both the ventilation and the perfusion images. There are no discrete segmental perfusion defects.    IMPRESSION:  Very low probability of pulmonary embolus.  --- End of Report ---  < end of copied text >      < from: US Duplex Venous Lower Ext Complete, Bilateral (11.26.21 @ 12:32) >  EXAM:  US DPLX LWR EXT VEINS COMPL BI                        PROCEDURE DATE:  11/26/2021    INTERPRETATION:  CLINICAL INFORMATION: Atrial fibrillation. Admitted with syncope. Evaluate for DVT.  COMPARISON: None available.  TECHNIQUE: Duplex sonography of the BILATERAL LOWER extremity veins with color and spectral Doppler, with and without compression.  FINDINGS:  RIGHT:  Normal compressibility of the RIGHT common femoral, femoral and popliteal veins.  Doppler examination showsnormal spontaneous and phasic flow.  No RIGHT calf vein thrombosis is detected.  LEFT:  Normal compressibility of the LEFT common femoral, femoral and popliteal veins.  Doppler examination shows normal spontaneous and phasic flow.  No LEFT calf vein thrombosis is detected.    IMPRESSION:  No evidence of deep venous thrombosis in either lower extremity.  < end of copied text >    IMPRESSION:  No acute intracranial hemorrhage, territorial infarct, mass effect or calvarial fracture.  --- End of Report ---  < end of copied text >     from: TTE Echo Complete w/o Contrast w/ Doppler (11.27.21 @ 11:38) >     EXAM:  ECHO TTE WO CON COMP W DOPP         PROCEDURE DATE:  11/27/2021        INTERPRETATION:  INDICATION: Murmur  Sonographer KL    Blood Pressure 120/67    Height 167.6 cm     Weight 55.6 kg       BSA 1.62 sq m    Dimensions:  LA 2.9       Normal Values: 2.0 - 4.0 cm  Ao 3.0        Normal Values: 2.0 - 3.8 cm  SEPTUM 0.8       Normal Values: 0.6 - 1.2 cm  PWT 0.8       Normal Values: 0.6 - 1.1 cm  LVIDd 3.7         Normal Values: 3.0 - 5.6 cm  LVIDs 2.5         Normal Values: 1.8 - 4.0 cm      OBSERVATIONS:  Technically difficult study  Mitral Valve: normal, trace physiologic MR.  Aortic Valve/Aorta: Calcified trileaflet aortic valve with decreased opening. Peak transaortic valve gradient is 18 mmHg with a mean transaortic valve [id number] mmHg. The aortic valve area is calculated to be 1.77 sq cm by continuity equation. This consistent with mild aortic stenosis.  Trace AI  Tricuspid Valve: normal with trace TR.  Pulmonic Valve: Not well-visualized  Left Atrium: normal  Right Atrium: Not well-visualized  Left Ventricle: normal LV size and systolic function, estimated LVEF of 60%.  Right Ventricle: Grossly normal size and systolic function.  Pericardium: no significant pericardial effusion.        IMPRESSION:  Technically difficult study  Normal left ventricular internal dimensions and systolic function, estimated LVEF of 60%.  Grossly normal RV size and systolic function.  Calcified trileaflet aortic valve with mild aortic stenosis, trace AI.  Trace physiologic MR and TR.  No significant pericardial effusion.

## 2021-11-29 NOTE — PROGRESS NOTE ADULT - TIME BILLING
Pt seen + examined. Case discussed with resident. Agree with assessment and plan above (edited by me in detail):  Time spent: 40min. More than 50% of the visit was spent counseling the patient on medical condition -- colitis, r/out DVT/PE, RBBB, Cryptosporidium infectious diarrhea.    89yo M w/ PMHx of Parkinson's Disease (on no medications), CAD (s/p stent), Bladder cancer (s/p tumor resection) presented to the ED after a fall, a/w sigmoid colitis and possible SMITH.    - met sepsis criteria on admit, likely due to colitis, improving  - CT scan -> Mid to distal sigmoid colitis which is infectious, inflammatory or ischemic in etiology.  - has been on Rocephin & Flagyl  - GI PCR came back this afternoon as positive for cryptosporidium species  - diarrhea improving per RN and pt  - discussed with ID (Dr. Anderson); recs appreciated -- rec to d/c Abx and monitor off Abx as that type of infectious diarrhea is typically self-limited   - will keep full liquid diet today as pt still with some discomfort/tenderness, but consider advancing back to solids tomorrow if pt improving  - c/w BCx x 2 - NGTD    - Patient came in after a fall from chair. Patient states that he lost balance and fell backward but does not remember details of what happened after. Mechanical fall vs. syncope.   - CT head showed no acute pathology - lateral ventricles dilated out of proportion to the sulcal prominence which could be due to central atrophy versus normal pressure hydrocephalus, but could also be due to atrophy  - Continue to monitor   - Orthostatic Vital signs  - Fall precautious   - Neurology Consulted Dr. Rudolph, recs appreciated     - Overnight on tele 11/26, the tele tech read strips as patient having possible alternating afib and NSR @80's-90's.  - cardio reviewed strips and there was no afib detected, just sinus arrhythmia  - EKG STAT -> NSR w/ t-wave inversions in V4-V6, RBBB, S1Q3T3 pattern  - d-dimer elevated: 764  - US LE dopplers to r/o DVT -> negative for DVT  - VQ scan urgent to r/o PE -- very low probability for PE  - f/up TTE  - c/w tele monitoring  - Cardiology consulted, Dr. Gomez, recs appreciated    - PT evaluation - rec [facility] -- pt trying to decide whether he would be willing to go to [facility] -- will have SW f/up
Pt seen + examined. Case discussed with resident. Agree with assessment and plan above (edited by me in detail):  Time spent: 40min. More than 50% of the visit was spent counseling the patient on medical condition -- colitis, r/out DVT/PE, RBBB.    89yo M w/ PMHx of Parkinson's Disease (on no medications), CAD (s/p stent), Bladder cancer (s/p tumor resection) presented to the ED after a fall, a/w sigmoid colitis and possible SMITH.    - met sepsis criteria on admit, likely due to colitis. Pt has abdominal tenderness on PE, afebrile overnight and HDS  - CT scan -> Mid to distal sigmoid colitis which is infectious, inflammatory or ischemic in etiology.  - C/W Rocephin & Flagyl for now  - F/U GI PCR as diarrhea is present now  - F/U BCx x 2 - NGTD  - Tylenol PRN for fever   - Infectious Disease consulted (Dr. Anderson); recs appreciated    - Patient came in after a fall from chair. Patient states that he lost balance and fell backward but does not remember details of what happened after. Mechanical fall vs. syncope.   - CT head showed no acute pathology - lateral ventricles dilated out of proportion to the sulcal prominence which could be due to central atrophy versus normal pressure hydrocephalus, but could also be due to atrophy  - Continue to monitor   - Orthostatic Vital signs  - Fall precautious   - Neurology Consulted Dr. Rudolph, recs appreciated   - PT evaluation - rec [facility]    - Overnight on tele, the tele tech read strips as patient having possible alternating afib and NSR @80's-90's.  - cardio reviewed strips and there was no afib detected, just sinus arrhythmia  - EKG STAT -> NSR w/ t-wave inversions in V4-V6, RBBB, S1Q3T3 pattern  - d-dimer elevated: 764  - f/up TTE  - f/u US LE dopplers to r/o DVT -> negative for DVT  - f/u VQ scan urgent to r/o PE -- very low probability for PE  - c/w tele monitoring  - Cardiology consulted, Dr. Gomez, recs appreciated
Pt seen + examined. Case discussed with resident. Agree with assessment and plan above (edited by me in detail):  Time spent: 37min. More than 50% of the visit was spent counseling the patient and pt's wife on medical condition -- colitis, RBBB, Cryptosporidium infectious diarrhea, suspected SMITH on CKD, deconditioning and [facility].    89yo M w/ PMHx of Parkinson's Disease (on no medications), CAD (s/p stent), Bladder cancer (s/p tumor resection) presented to the ED after a fall, a/w sigmoid colitis and suspected SMITH on CKD.    - met sepsis criteria on admit, likely due to colitis, resolved  - diarrhea improved per pt and RN  - CT scan -> Mid to distal sigmoid colitis which is infectious, inflammatory or ischemic in etiology  - BCx x 2 - NGTD  - GI PCR positive for cryptosporidium species  - cryptosporidium infectious diarrhea typically self-limited -> monitor off abx  - c/w solid, low fiber diet, tolerating well  - continue to monitor VS and for s/s of worsening infection and trend WBC in AM CBC  - ID following - Dr. Justin obregon appreciated    - hx of PCI to RPL1 in 06/2008 per chart review  - pt has not taken anti-platelet agents or statins for years  - pt wishes to avoid medications and not interested in statin  - accepted ASA 81mg po daily    - Patient came in after a fall from chair. Patient states that he lost balance and fell backward, but does not remember details of what happened after. Mechanical fall vs. syncope.   - CT head showed no acute pathology - lateral ventricles dilated out of proportion to the sulcal prominence which could be due to central atrophy versus normal pressure hydrocephalus, but could also be due to atrophy  - orthostatic vital signs -- negative  - Fall precautious   - Neurology consulted - Dr. Klaudia obregon appreciated   - PT -> rec [facility] on re-evaluation and now pt and his wife agreeable     - SW applied and pt will have bed available for tomorrow -- dc tomorrow
Pt seen + examined. Case discussed with resident. Agree with assessment and plan above (edited by me in detail):  Time spent: 37min. More than 50% of the visit was spent counseling the patient and pt's wife on medical condition -- colitis, RBBB, Cryptosporidium infectious diarrhea, suspected SMITH on CKD.    89yo M w/ PMHx of Parkinson's Disease (on no medications), CAD (s/p stent), Bladder cancer (s/p tumor resection) presented to the ED after a fall, a/w sigmoid colitis and suspected SMITH on CKD.    - met sepsis criteria on admit, likely due to colitis, resolved   - CT scan -> Mid to distal sigmoid colitis which is infectious, inflammatory or ischemic in etiology  - GI PCR positive for cryptosporidium species  - diarrhea improving per RN and pt  - discussed with ID (Dr. Anderson); recs appreciated -- discontinue Abx and will monitor off abx as cryptosporidium infectious diarrhea typically self-limited   - advance diet to solids low-fiber   - BCx x 2 - NGTD    - hx of PCI to RPL1 in 06/2008 per chart review  - pt has not taken anti-platelet agents or statins for years  - pt wishes to avoid medications and not interested in statin  - accepted ASA 81mg po daily    - Patient came in after a fall from chair. Patient states that he lost balance and fell backward but does not remember details of what happened after. Mechanical fall vs. syncope.   - CT head showed no acute pathology - lateral ventricles dilated out of proportion to the sulcal prominence which could be due to central atrophy versus normal pressure hydrocephalus, but could also be due to atrophy  - orthostatic vital signs -- negative  - Fall precautious   - Neurology consulted (Dr. Rudolph), recs appreciated   - PT evaluation, recs [facility] -- pt hesitant to go to [facility] but will reassess with PT tomorrow and see whether at all feasible to go home with only his wife's support

## 2021-11-29 NOTE — PROGRESS NOTE ADULT - PROBLEM SELECTOR PLAN 6
- hx of PCI to RPL1 in 06/2008 per chart review  - pt has not taken anti-platelet agents or statins for years  - pt wishes to avoid medications and not interested in statin  - accepted ASA 81mg po daily

## 2021-11-29 NOTE — PROGRESS NOTE ADULT - PROBLEM SELECTOR PLAN 8
Patient asymptomatic  - UA unimpressive w/ trace ketones, 100 protein, moderate blood, moderate bacteria, 3-5 granular casts  - UCx negative  - Tylenol PRN for fever   - Infectious Disease consulted (Dr. Anderson); recs appreciated

## 2021-11-29 NOTE — PROGRESS NOTE ADULT - SUBJECTIVE AND OBJECTIVE BOX
Patient is a 89y old  Male who presents with a chief complaint of Sigmoid Colitis (25 Nov 2021 10:57)       HPI:  89 yo M w/ PMHx/ Parkinson's Disease (on no medications), CAD (s/p stent), Bladder cancer (s/p tumor resection) presented to the ED after a fall. Patient was sitting on a rolling chair and fell over. Patient states that he remembers the fall - no dizziness or prodromal symptoms. States that he lost his balance and fell backwards. However, wife at bedside states that she went to go see him and found him on the floor which is when she called 911 and was brought the the ED. Patient does not recall if he hit his head, or LOC but states that after he felt dizziness. Patient's CT abdomen showed sigmoid colitis however patient not complaining of N/V/D but came in with a temperature of 103. Patient denies dizziness at this point, CP or SOB   Of note, Patient lives with wife, and independent at baseline. Patient is not vaccinated for COVID-19 or influenza.  Patient is poor historian.  Denies SOB/N/V.  States he is urinating. Unsure if he has seen nephrologist in the past.    No new complaints    PAST MEDICAL & SURGICAL HISTORY:  Shoulder fracture    Bladder cancer    Stented coronary artery  over 15 years ago as per patient    Parkinsons    S/P cardiac catheterization         FAMILY HISTORY:  FH: HTN (hypertension)    NC    Social History:Non smoker    MEDICATIONS  (STANDING):  cefTRIAXone   IVPB 1000 milliGRAM(s) IV Intermittent every 24 hours  heparin   Injectable 5000 Unit(s) SubCutaneous every 12 hours  lactated ringers. 1000 milliLiter(s) (75 mL/Hr) IV Continuous <Continuous>  metroNIDAZOLE  IVPB 500 milliGRAM(s) IV Intermittent every 8 hours  potassium phosphate / sodium phosphate Powder (PHOS-NaK) 1 Packet(s) Oral every 6 hours  saccharomyces boulardii 250 milliGRAM(s) Oral two times a day    MEDICATIONS  (PRN):  acetaminophen     Tablet .. 650 milliGRAM(s) Oral every 6 hours PRN Temp greater or equal to 38C (100.4F), Mild Pain (1 - 3)  melatonin 3 milliGRAM(s) Oral at bedtime PRN Insomnia   Meds reviewed    Allergies    clindamycin (Unknown)  Levaquin (Unknown)    Intolerances         REVIEW OF SYSTEMS:    Constitutional: NAD  HEENT: Denies headaches and dizziness  Respiratory: denies SOB, cough, or wheezing  Cardiovascular: denies CP, palpitations  Gastrointestinal: Denies nausea, denies vomiting, diarrhea, constipation, abdominal pain, or bloody stools  Genitourinary: denies painful urination, increased frequency, urgency, or bloody urine  Skin: denies rashes or itching  Musculoskeletal: denies muscle aches, joint swelling  Neurologic: Denies generalized weakness, denies loss of sensation, numbness, or tingling    ICU Vital Signs Last 24 Hrs  T(C): 36.9 (29 Nov 2021 13:00), Max: 37.2 (29 Nov 2021 04:30)  T(F): 98.4 (29 Nov 2021 13:00), Max: 98.9 (29 Nov 2021 04:30)  HR: 90 (29 Nov 2021 13:00) (78 - 90)  BP: 103/65 (29 Nov 2021 13:00) (100/65 - 126/76)  BP(mean): --  ABP: --  ABP(mean): --  RR: 17 (29 Nov 2021 13:00) (17 - 19)  SpO2: 95% (29 Nov 2021 13:00) (92% - 95%)      PHYSICAL EXAM:    GENERAL: NAD  HEAD:  Atraumatic, Normocephalic  EYES: EOMI, conjunctiva and sclera clear, [place]  ENMT: No Drainage from nares, No drainage from ears  NECK: Supple, neck  veins full  NERVOUS SYSTEM:  Awake and Alert  CHEST/LUNG: Clear to percussion bilaterally; No rales, rhonchi, wheezing, or rubs  HEART: Regular rate and rhythm; No murmurs, rubs, or gallops  ABDOMEN: Soft, Nontender, Nondistended; Bowel sounds present  EXTREMITIES:  No Edema  SKIN: No rashes No obvious ecchymosis      LABS:                                              10.7   8.16  )-----------( 231      ( 28 Nov 2021 08:48 )             34.2     11-28    142  |  112<H>  |  18  ----------------------------<  88  3.7   |  26  |  1.80<H>    Ca    9.2      28 Nov 2021 08:48  Phos  2.3     11-28  Mg     1.9     11-28    TPro  6.5  /  Alb  2.7<L>  /  TBili  0.3  /  DBili  x   /  AST  22  /  ALT  17  /  AlkPhos  39<L>  11-28

## 2021-11-29 NOTE — PROGRESS NOTE ADULT - SUBJECTIVE AND OBJECTIVE BOX
Neurology follow up note    HAYDER [id number]      Interval History:    Patient feels ok no new complaints.    Allergies    clindamycin (Unknown)  Levaquin (Unknown)    Intolerances        MEDICATIONS    acetaminophen     Tablet .. 650 milliGRAM(s) Oral every 6 hours PRN  aspirin  chewable 81 milliGRAM(s) Oral daily  heparin   Injectable 5000 Unit(s) SubCutaneous every 8 hours  melatonin 3 milliGRAM(s) Oral at bedtime PRN  saccharomyces boulardii 250 milliGRAM(s) Oral two times a day              Vital Signs Last 24 Hrs  T(C): 37.2 (29 Nov 2021 04:30), Max: 37.2 (29 Nov 2021 04:30)  T(F): 98.9 (29 Nov 2021 04:30), Max: 98.9 (29 Nov 2021 04:30)  HR: 85 (29 Nov 2021 04:30) (78 - 95)  BP: 100/65 (29 Nov 2021 04:30) (100/65 - 126/76)  BP(mean): --  RR: 18 (29 Nov 2021 04:30) (16 - 19)  SpO2: 92% (29 Nov 2021 04:30) (92% - 97%)    REVIEW OF SYSTEMS:  Constitutional:  The patient denies fever, chills, or night sweats.  Head:  No headaches.  Eyes:  No double vision or blurry vision.  Ears:  No ringing in the ears.  Neck:  No neck pain.  Respiratory:  No shortness of breath.  Cardiovascular:  No chest pain.  Abdomen:  No nausea, vomiting, or abdominal pain.  Extremities/Neurological:  No numbness or tingling.  Musculoskeletal:  Occasional joint pain.    PHYSICAL EXAMINATION:   HEENT:  Head:  Normocephalic, atraumatic.  Eyes:  No scleral icterus.  Ears:  Hearing bilaterally was intact.  NECK:  Supple.  RESPIRATORY:  Good air entry bilaterally.  CARDIOVASCULAR:  S1 and S2 heard.  ABDOMEN:  Soft and nontender.  EXTREMITIES:  No clubbing or cyanosis was noted.      NEUROLOGIC:  The patient is awake and alert.  Location was hospital,    Was able to follow complex commands, took right hand touch left ear.  Speech was fluent.  Smile was symmetric.  Motor:  Bilateral upper were 4/5, bilateral lower were 4-/5.  The patient has slight resting tremors.  Slight bradykinesia.              LABS:  CBC Full  -  ( 28 Nov 2021 08:48 )  WBC Count : 8.16 K/uL  RBC Count : 3.53 M/uL  Hemoglobin : 10.7 g/dL  Hematocrit : 34.2 %  Platelet Count - Automated : 231 K/uL  Mean Cell Volume : 96.9 fl  Mean Cell Hemoglobin : 30.3 pg  Mean Cell Hemoglobin Concentration : 31.3 gm/dL  Auto Neutrophil # : 4.68 K/uL  Auto Lymphocyte # : 0.91 K/uL  Auto Monocyte # : 1.41 K/uL  Auto Eosinophil # : 0.59 K/uL  Auto Basophil # : 0.03 K/uL  Auto Neutrophil % : 57.3 %  Auto Lymphocyte % : 11.2 %  Auto Monocyte % : 17.3 %  Auto Eosinophil % : 7.2 %  Auto Basophil % : 0.4 %      11-28    142  |  112<H>  |  18  ----------------------------<  88  3.7   |  26  |  1.80<H>    Ca    9.2      28 Nov 2021 08:48  Phos  2.3     11-28  Mg     1.9     11-28    TPro  6.5  /  Alb  2.7<L>  /  TBili  0.3  /  DBili  x   /  AST  22  /  ALT  17  /  AlkPhos  39<L>  11-28    Hemoglobin A1C:     LIVER FUNCTIONS - ( 28 Nov 2021 08:48 )  Alb: 2.7 g/dL / Pro: 6.5 g/dL / ALK PHOS: 39 U/L / ALT: 17 U/L / AST: 22 U/L / GGT: x           Vitamin B12         RADIOLOGY      ANALYSIS AND PLAN:  A 89-year-old with an episode of dizziness, fall, and Parkinson's.  For episode of fall, suspect this could be secondary to age-related changes, balance issues, underlying Parkinson's, slight dehydration, possibly episode of presyncopal event.  The examination does not show any signs at present to suggest a new cerebrovascular accident has ensued.  Telemetry evaluation as needed.  Monitor systolic blood pressure.  For mild cognitive impairment supportive treatment   For history of Parkinson's disease, the patient appears to be stable, currently on no medications.  For episodes of dizziness, monitor systolic blood pressure.  physical therapy  as tolerated   no new events      Attempting to speak with the spouse, Keyla, at [phone]. 11/25  She appears to have some underlying mild cognitive impairment as well.    Greater than 27 minutes of time was spent with the patient, plan of care, reviewing data, speaking to the multidisciplinary healthcare team with greater than 50% of that time in counseling and care coordination.    Thank you for the courtesy of this consultation.

## 2021-11-29 NOTE — PROGRESS NOTE ADULT - PROBLEM SELECTOR PLAN 7
- Hypophosphatemic - replete PRN   - hypokalemia resolved   - continue to monitor electrolytes in AM CMP + P  - nephrology following, Dr. Freeman, recs appreciated

## 2021-11-29 NOTE — PROGRESS NOTE ADULT - PROBLEM SELECTOR PLAN 4
SMITH vs CKD (unknown baseline)  - Patient has no known history of kidney disease, but also with very poor outpt f/up and is a poor historian  - Admission Cr 2.20, downtrended and stable at 1.8, likely @ baseline per Nephro   - US of kidney: technically limited, R renal pelviectasis, large b/l renal cysts w/ complex appearing cyst R kidney, patient to follow up with Urology outpatient   - Monitor CMP daily   - nephrology following - Dr. Freeman - sabas appreciated  - Patient reports he used to see PCP Dr. Montes De Oca, will reach out to his office Monday for records SMITH vs CKD (unknown baseline)  - Patient has no known history of kidney disease, but also with very poor outpt f/up and is a poor historian  - Admission Cr 2.20, downtrended and stable at 1.8, likely @ baseline per Nephro   - US of kidney: technically limited, R renal pelviectasis, large b/l renal cysts w/ complex appearing cyst R kidney, patient to follow up with Urology outpatient   - Monitor CMP daily   - nephrology following - Dr. Freeman - sabas appreciated  - Patient reports he used to see PCP Dr. Montes De Oca, called the office but he had not been to PCP since 2017, medical records pre-EMR and they could not provide us with records

## 2021-11-29 NOTE — PROGRESS NOTE ADULT - PROBLEM SELECTOR PLAN 1
- met sepsis criteria on admit, likely due to colitis, resolved  - diarrhea improved per pt and RN  - CT scan -> Mid to distal sigmoid colitis which is infectious, inflammatory or ischemic in etiology  - BCx x 2 - NGTD  - GI PCR positive for cryptosporidium species  - cryptosporidium infectious diarrhea typically self-limited -> monitor off abx  - c/w full diet, tolerating well  - continue to monitor VS and for s/s of worsening infection and trend WBC in AM CBC  - ID following - Dr. Anderson - sabas appreciated - met sepsis criteria on admit, likely due to colitis, resolved  - diarrhea improved per pt and RN  - CT scan -> Mid to distal sigmoid colitis which is infectious, inflammatory or ischemic in etiology  - BCx x 2 - NGTD  - GI PCR positive for cryptosporidium species  - cryptosporidium infectious diarrhea typically self-limited -> monitor off abx  - c/w solid, low fiber diet, tolerating well  - continue to monitor VS and for s/s of worsening infection and trend WBC in AM CBC  - ID following - Dr. Anderson - recs appreciated

## 2021-11-29 NOTE — PROGRESS NOTE ADULT - PROBLEM SELECTOR PLAN 3
- Overnight, on tele 11/26, the tele tech read strips as patient having possible alternating afib and NSR @80's-90's.  - cardio reviewed strips and there was no afib detected, just sinus arrhythmia  - Added ASA for remote history of stent   - EKG STAT -> NSR w/ t-wave inversions in V4-V6, RBBB, S1Q3T3 pattern  - d-dimer elevated: 764  - US LE dopplers to r/o DVT -> negative for DVT  - VQ scan urgent to r/o PE -- very low probability for PE  - TTE relatively normal  - Cardiology consulted (Dr. Gomez), recs appreciated  - Patient reports he used to see PCP Dr. Montes De Oca, will reach out to his office Monday for records - Overnight, on tele 11/26, the tele tech read strips as patient having possible alternating afib and NSR @80's-90's.  - cardio reviewed strips and there was no afib detected, just sinus arrhythmia  - Added ASA for remote history of stent   - EKG STAT -> NSR w/ t-wave inversions in V4-V6, RBBB, S1Q3T3 pattern  - d-dimer elevated: 764  - US LE dopplers to r/o DVT -> negative for DVT  - VQ scan urgent to r/o PE -- very low probability for PE  - TTE relatively normal  - Cardiology consulted (Dr. Gomez), recs appreciated  - Patient reports he used to see PCP Dr. Montes De Oca, has not been to PCP since 2017, medical records pre EMR - Overnight, on tele 11/26, the tele tech read strips as patient having possible alternating afib and NSR @80's-90's.  - cardio reviewed strips and there was no afib detected, just sinus arrhythmia  - Added ASA for remote history of stent   - EKG STAT -> NSR w/ t-wave inversions in V4-V6, RBBB, S1Q3T3 pattern  - d-dimer elevated: 764  - US LE dopplers to r/o DVT -> negative for DVT  - VQ scan urgent to r/o PE -- very low probability for PE  - TTE relatively normal  - Cardiology consulted (Dr. Gomez), recs appreciated  - Patient reports he used to see PCP Dr. Montes De Oca, called the office but he had not been to PCP since 2017, medical records pre-EMR and they could not provide us with records

## 2021-11-30 ENCOUNTER — TRANSCRIPTION ENCOUNTER (OUTPATIENT)
Age: 86
End: 2021-11-30

## 2021-11-30 VITALS
SYSTOLIC BLOOD PRESSURE: 97 MMHG | RESPIRATION RATE: 18 BRPM | HEART RATE: 94 BPM | DIASTOLIC BLOOD PRESSURE: 65 MMHG | OXYGEN SATURATION: 96 % | TEMPERATURE: 98 F

## 2021-11-30 LAB
ANION GAP SERPL CALC-SCNC: 7 MMOL/L — SIGNIFICANT CHANGE UP (ref 5–17)
BUN SERPL-MCNC: 25 MG/DL — HIGH (ref 7–23)
CALCIUM SERPL-MCNC: 8.9 MG/DL — SIGNIFICANT CHANGE UP (ref 8.5–10.1)
CHLORIDE SERPL-SCNC: 110 MMOL/L — HIGH (ref 96–108)
CO2 SERPL-SCNC: 23 MMOL/L — SIGNIFICANT CHANGE UP (ref 22–31)
CREAT SERPL-MCNC: 1.8 MG/DL — HIGH (ref 0.5–1.3)
CULTURE RESULTS: SIGNIFICANT CHANGE UP
CULTURE RESULTS: SIGNIFICANT CHANGE UP
GLUCOSE SERPL-MCNC: 95 MG/DL — SIGNIFICANT CHANGE UP (ref 70–99)
HCT VFR BLD CALC: 30.5 % — LOW (ref 39–50)
HGB BLD-MCNC: 9.9 G/DL — LOW (ref 13–17)
MAGNESIUM SERPL-MCNC: 2.2 MG/DL — SIGNIFICANT CHANGE UP (ref 1.6–2.6)
MCHC RBC-ENTMCNC: 31 PG — SIGNIFICANT CHANGE UP (ref 27–34)
MCHC RBC-ENTMCNC: 32.5 GM/DL — SIGNIFICANT CHANGE UP (ref 32–36)
MCV RBC AUTO: 95.6 FL — SIGNIFICANT CHANGE UP (ref 80–100)
NRBC # BLD: 0 /100 WBCS — SIGNIFICANT CHANGE UP (ref 0–0)
PHOSPHATE SERPL-MCNC: 3.3 MG/DL — SIGNIFICANT CHANGE UP (ref 2.5–4.5)
PLATELET # BLD AUTO: 271 K/UL — SIGNIFICANT CHANGE UP (ref 150–400)
POTASSIUM SERPL-MCNC: 3.7 MMOL/L — SIGNIFICANT CHANGE UP (ref 3.5–5.3)
POTASSIUM SERPL-SCNC: 3.7 MMOL/L — SIGNIFICANT CHANGE UP (ref 3.5–5.3)
RBC # BLD: 3.19 M/UL — LOW (ref 4.2–5.8)
RBC # FLD: 19.6 % — HIGH (ref 10.3–14.5)
SODIUM SERPL-SCNC: 140 MMOL/L — SIGNIFICANT CHANGE UP (ref 135–145)
SPECIMEN SOURCE: SIGNIFICANT CHANGE UP
SPECIMEN SOURCE: SIGNIFICANT CHANGE UP
WBC # BLD: 7.11 K/UL — SIGNIFICANT CHANGE UP (ref 3.8–10.5)
WBC # FLD AUTO: 7.11 K/UL — SIGNIFICANT CHANGE UP (ref 3.8–10.5)

## 2021-11-30 PROCEDURE — 99232 SBSQ HOSP IP/OBS MODERATE 35: CPT

## 2021-11-30 PROCEDURE — 99239 HOSP IP/OBS DSCHRG MGMT >30: CPT

## 2021-11-30 RX ORDER — LIDOCAINE 4 G/100G
1 CREAM TOPICAL
Qty: 0 | Refills: 0 | DISCHARGE
Start: 2021-11-30

## 2021-11-30 RX ORDER — ACETAMINOPHEN 500 MG
2 TABLET ORAL
Qty: 0 | Refills: 0 | DISCHARGE
Start: 2021-11-30

## 2021-11-30 RX ORDER — ASPIRIN/CALCIUM CARB/MAGNESIUM 324 MG
1 TABLET ORAL
Qty: 0 | Refills: 0 | DISCHARGE
Start: 2021-11-30

## 2021-11-30 RX ORDER — SACCHAROMYCES BOULARDII 250 MG
1 POWDER IN PACKET (EA) ORAL
Qty: 0 | Refills: 0 | DISCHARGE
Start: 2021-11-30

## 2021-11-30 RX ORDER — TAMSULOSIN HYDROCHLORIDE 0.4 MG/1
1 CAPSULE ORAL
Qty: 0 | Refills: 0 | DISCHARGE
Start: 2021-11-30

## 2021-11-30 RX ADMIN — HEPARIN SODIUM 5000 UNIT(S): 5000 INJECTION INTRAVENOUS; SUBCUTANEOUS at 13:06

## 2021-11-30 RX ADMIN — HEPARIN SODIUM 5000 UNIT(S): 5000 INJECTION INTRAVENOUS; SUBCUTANEOUS at 05:09

## 2021-11-30 RX ADMIN — Medication 250 MILLIGRAM(S): at 05:09

## 2021-11-30 RX ADMIN — Medication 81 MILLIGRAM(S): at 12:04

## 2021-11-30 NOTE — PROGRESS NOTE ADULT - ASSESSMENT
SMITH on Likely CKD  HTN  Anemia  Colitis  Syncope  Renal cyst    -Unknown baseline creatinine  -Urine lytes reviewed  -UA small ketone, 100 protein  -UPC 1.4  -Renal indices remain stable; likely baseline; stabel lytes  -Renal sono no hydro; complex cyst noted; consider Urology evaluation, can be done outpatient   -Can avoid additional IVF  -BP stable    Renally stable for DC    Thank you

## 2021-11-30 NOTE — PROGRESS NOTE ADULT - SUBJECTIVE AND OBJECTIVE BOX
[facility] -- Britany Stark, Diana, Raoul, Jason Puckett Savella  Office # [phone]      Follow Up:  CAD , fall     Subjective/Observations:     No events overnight resting comfortably in bed.  No complaints of chest pain, dyspnea, or palpitations reported. No signs of orthopnea or PND.    REVIEW OF SYSTEMS: All other review of systems is negative unless indicated above    PAST MEDICAL & SURGICAL HISTORY:  Shoulder fracture    Bladder cancer    Stented coronary artery  over 15 years ago as per patient    Parkinsons    S/P cardiac catheterization        MEDICATIONS  (STANDING):  aspirin  chewable 81 milliGRAM(s) Oral daily  heparin   Injectable 5000 Unit(s) SubCutaneous every 8 hours  lidocaine   4% Patch 1 Patch Transdermal every 24 hours  saccharomyces boulardii 250 milliGRAM(s) Oral two times a day  tamsulosin 0.4 milliGRAM(s) Oral at bedtime    MEDICATIONS  (PRN):  acetaminophen     Tablet .. 650 milliGRAM(s) Oral every 6 hours PRN Temp greater or equal to 38C (100.4F), Mild Pain (1 - 3)  melatonin 3 milliGRAM(s) Oral at bedtime PRN Insomnia      Allergies    clindamycin (Unknown)  Levaquin (Unknown)    Intolerances        Vital Signs Last 24 Hrs  T(C): 36.7 (30 Nov 2021 04:17), Max: 37.6 (29 Nov 2021 20:37)  T(F): 98.1 (30 Nov 2021 04:17), Max: 99.7 (29 Nov 2021 20:37)  HR: 83 (30 Nov 2021 04:17) (83 - 91)  BP: 100/60 (30 Nov 2021 04:17) (100/60 - 126/62)  BP(mean): --  RR: 18 (30 Nov 2021 04:17) (17 - 18)  SpO2: 97% (30 Nov 2021 04:17) (93% - 97%)    I&O's Summary    29 Nov 2021 07:01  -  30 Nov 2021 07:00  --------------------------------------------------------  IN: 0 mL / OUT: 150 mL / NET: -150 mL          PHYSICAL EXAM:  TELE: SR  Constitutional: NAD, awake and alert, well-developed  HEENT: Moist Mucous Membranes, Anicteric  Pulmonary: Non-labored, breath sounds are clear bilaterally, No wheezing, crackles or rhonchi  Cardiovascular: Regular, S1 and S2 nl, No murmurs, rubs, gallops or clicks  Gastrointestinal: Bowel Sounds present, soft, nontender.   Lymph: No lymphadenopathy. No peripheral edema.  Skin: No visible rashes or ulcers.  Psych:  Mood & affect appropriate    LABS: All Labs Reviewed:                        9.9    7.11  )-----------( 271      ( 30 Nov 2021 08:25 )             30.5                         10.7   8.16  )-----------( 231      ( 28 Nov 2021 08:48 )             34.2     30 Nov 2021 08:25    140    |  110    |  25     ----------------------------<  95     3.7     |  23     |  1.80   28 Nov 2021 08:48    142    |  112    |  18     ----------------------------<  88     3.7     |  26     |  1.80   27 Nov 2021 17:34    141    |  112    |  20     ----------------------------<  108    4.1     |  24     |  1.80     Ca    8.9        30 Nov 2021 08:25  Ca    9.2        28 Nov 2021 08:48  Ca    8.7        27 Nov 2021 17:34  Phos  3.3       30 Nov 2021 08:25  Phos  2.3       28 Nov 2021 08:48  Phos  3.4       27 Nov 2021 17:34  Mg     2.2       30 Nov 2021 08:25  Mg     1.9       28 Nov 2021 08:48    TPro  6.5    /  Alb  2.7    /  TBili  0.3    /  DBili  x      /  AST  22     /  ALT  17     /  AlkPhos  39     28 Nov 2021 08:48        12 Lead ECG:   Ventricular Rate 80 BPM  Atrial Rate 80 BPM  P-R Interval 168 ms  QRS Duration 140 ms  Q-T Interval 416 ms  QTC Calculation(Bazett) 479 ms  P Axis 11 degrees  R Axis -27 degrees  T Axis -6 degrees  Diagnosis Line Normal sinus rhythm with sinus arrhythmia  Right bundle branch block  Inferior infarct (cited on or before 24-NOV-2021)  Abnormal ECG  When compared with ECG of 24-NOV-2021 22:14,  No significant change was found  Confirmed by STEPHEN VERA ([id number]) on 11/26/2021 6:11:40 PM (11-26-21 @ 10:56)    < from: NM Pulmonary Ventilation/Perfusion Scan (11.26.21 @ 16:07) >  EXAM:  NM PULM VENTILATION PERFUS IMG                        PROCEDURE DATE:  11/26/2021    INTERPRETATION:  RADIOPHARMACEUTICAL: 1 mCi Tc-99m-DTPA aerosol by inhalation; 6 mCi Tc-99m-MAA, I.V.  CLINICAL STATEMENT: 89-year-old male withhistory of syncope and elevated d-dimer.  TECHNIQUE:  Ventilation and perfusion images of the lungs were obtained following administration of Tc-99m-DTPA and Tc-99m-MAA. Images were obtained in the anterior, posterior, both lateral, and all 4 oblique projections. The study was interpreted in conjunction with chest radiograph of 11/26/2021.  No prior VQ scan.  FINDINGS: There is heterogeneous tracer distribution in the lungs, on both the ventilation and the perfusion images. There are no discrete segmental perfusion defects.    IMPRESSION:  Very low probability of pulmonary embolus.  --- End of Report ---  < end of copied text >      < from: US Duplex Venous Lower Ext Complete, Bilateral (11.26.21 @ 12:32) >  EXAM:  US DPLX LWR EXT VEINS COMPL BI                        PROCEDURE DATE:  11/26/2021    INTERPRETATION:  CLINICAL INFORMATION: Atrial fibrillation. Admitted with syncope. Evaluate for DVT.  COMPARISON: None available.  TECHNIQUE: Duplex sonography of the BILATERAL LOWER extremity veins with color and spectral Doppler, with and without compression.  FINDINGS:  RIGHT:  Normal compressibility of the RIGHT common femoral, femoral and popliteal veins.  Doppler examination showsnormal spontaneous and phasic flow.  No RIGHT calf vein thrombosis is detected.  LEFT:  Normal compressibility of the LEFT common femoral, femoral and popliteal veins.  Doppler examination shows normal spontaneous and phasic flow.  No LEFT calf vein thrombosis is detected.    IMPRESSION:  No evidence of deep venous thrombosis in either lower extremity.  < end of copied text >    IMPRESSION:  No acute intracranial hemorrhage, territorial infarct, mass effect or calvarial fracture.  --- End of Report ---  < end of copied text >     from: TTE Echo Complete w/o Contrast w/ Doppler (11.27.21 @ 11:38) >     EXAM:  ECHO TTE WO CON COMP W DOPP         PROCEDURE DATE:  11/27/2021        INTERPRETATION:  INDICATION: Murmur  Sonographer KL    Blood Pressure 120/67    Height 167.6 cm     Weight 55.6 kg       BSA 1.62 sq m    Dimensions:  LA 2.9       Normal Values: 2.0 - 4.0 cm  Ao 3.0        Normal Values: 2.0 - 3.8 cm  SEPTUM 0.8       Normal Values: 0.6 - 1.2 cm  PWT 0.8       Normal Values: 0.6 - 1.1 cm  LVIDd 3.7         Normal Values: 3.0 - 5.6 cm  LVIDs 2.5         Normal Values: 1.8 - 4.0 cm      OBSERVATIONS:  Technically difficult study  Mitral Valve: normal, trace physiologic MR.  Aortic Valve/Aorta: Calcified trileaflet aortic valve with decreased opening. Peak transaortic valve gradient is 18 mmHg with a mean transaortic valve [id number] mmHg. The aortic valve area is calculated to be 1.77 sq cm by continuity equation. This consistent with mild aortic stenosis.  Trace AI  Tricuspid Valve: normal with trace TR.  Pulmonic Valve: Not well-visualized  Left Atrium: normal  Right Atrium: Not well-visualized  Left Ventricle: normal LV size and systolic function, estimated LVEF of 60%.  Right Ventricle: Grossly normal size and systolic function.  Pericardium: no significant pericardial effusion.        IMPRESSION:  Technically difficult study  Normal left ventricular internal dimensions and systolic function, estimated LVEF of 60%.  Grossly normal RV size and systolic function.  Calcified trileaflet aortic valve with mild aortic stenosis, trace AI.  Trace physiologic MR and TR.  No significant pericardial effusion.

## 2021-11-30 NOTE — PROGRESS NOTE ADULT - SUBJECTIVE AND OBJECTIVE BOX
Patient is a 89y old  Male who presents with a chief complaint of Sigmoid Colitis (25 Nov 2021 10:57)       HPI:  89 yo M w/ PMHx/ Parkinson's Disease (on no medications), CAD (s/p stent), Bladder cancer (s/p tumor resection) presented to the ED after a fall. Patient was sitting on a rolling chair and fell over. Patient states that he remembers the fall - no dizziness or prodromal symptoms. States that he lost his balance and fell backwards. However, wife at bedside states that she went to go see him and found him on the floor which is when she called 911 and was brought the the ED. Patient does not recall if he hit his head, or LOC but states that after he felt dizziness. Patient's CT abdomen showed sigmoid colitis however patient not complaining of N/V/D but came in with a temperature of 103. Patient denies dizziness at this point, CP or SOB   Of note, Patient lives with wife, and independent at baseline. Patient is not vaccinated for COVID-19 or influenza.  Patient is poor historian.  Denies SOB/N/V.  States he is urinating. Unsure if he has seen nephrologist in the past.    No new complaints    PAST MEDICAL & SURGICAL HISTORY:  Shoulder fracture    Bladder cancer    Stented coronary artery  over 15 years ago as per patient    Parkinsons    S/P cardiac catheterization         FAMILY HISTORY:  FH: HTN (hypertension)    NC    Social History:Non smoker    MEDICATIONS  (STANDING):  cefTRIAXone   IVPB 1000 milliGRAM(s) IV Intermittent every 24 hours  heparin   Injectable 5000 Unit(s) SubCutaneous every 12 hours  lactated ringers. 1000 milliLiter(s) (75 mL/Hr) IV Continuous <Continuous>  metroNIDAZOLE  IVPB 500 milliGRAM(s) IV Intermittent every 8 hours  potassium phosphate / sodium phosphate Powder (PHOS-NaK) 1 Packet(s) Oral every 6 hours  saccharomyces boulardii 250 milliGRAM(s) Oral two times a day    MEDICATIONS  (PRN):  acetaminophen     Tablet .. 650 milliGRAM(s) Oral every 6 hours PRN Temp greater or equal to 38C (100.4F), Mild Pain (1 - 3)  melatonin 3 milliGRAM(s) Oral at bedtime PRN Insomnia   Meds reviewed    Allergies    clindamycin (Unknown)  Levaquin (Unknown)    Intolerances         REVIEW OF SYSTEMS:    Constitutional: NAD  HEENT: Denies headaches and dizziness  Respiratory: denies SOB, cough, or wheezing  Cardiovascular: denies CP, palpitations  Gastrointestinal: Denies nausea, denies vomiting, diarrhea, constipation, abdominal pain, or bloody stools  Genitourinary: denies painful urination, increased frequency, urgency, or bloody urine  Skin: denies rashes or itching  Musculoskeletal: denies muscle aches, joint swelling  Neurologic: Denies generalized weakness, denies loss of sensation, numbness, or tingling    Vital Signs Last 24 Hrs  T(C): 36.7 (30 Nov 2021 04:17), Max: 37.6 (29 Nov 2021 20:37)  T(F): 98.1 (30 Nov 2021 04:17), Max: 99.7 (29 Nov 2021 20:37)  HR: 83 (30 Nov 2021 04:17) (83 - 91)  BP: 100/60 (30 Nov 2021 04:17) (100/60 - 126/62)  BP(mean): --  RR: 18 (30 Nov 2021 04:17) (17 - 18)  SpO2: 97% (30 Nov 2021 04:17) (93% - 97%)    PHYSICAL EXAM:    GENERAL: NAD  HEAD:  Atraumatic, Normocephalic  EYES: EOMI, conjunctiva and sclera clear, [place]  ENMT: No Drainage from nares, No drainage from ears  NECK: Supple  NERVOUS SYSTEM:  Awake and Alert  CHEST/LUNG: Clear to percussion bilaterally; No rales, rhonchi, wheezing, or rubs  HEART: Regular rate and rhythm; No murmurs, rubs, or gallops  ABDOMEN: Soft, Nontender, Nondistended; Bowel sounds present  EXTREMITIES:  No Edema  SKIN: No rashes No obvious ecchymosis      LABS:                                                      9.9    7.11  )-----------( 271      ( 30 Nov 2021 08:25 )             30.5     11-30    140  |  110<H>  |  25<H>  ----------------------------<  95  3.7   |  23  |  1.80<H>    Ca    8.9      30 Nov 2021 08:25  Phos  3.3     11-30  Mg     2.2     11-30

## 2021-11-30 NOTE — PROGRESS NOTE ADULT - REASON FOR ADMISSION
Sigmoid Colitis

## 2021-11-30 NOTE — PROGRESS NOTE ADULT - ASSESSMENT
89 yo M w/ PMHx of Parkinson's Disease (on no medications), CAD (s/p stent), Bladder cancer (s/p tumor resection) presented to the ED after a fall admitted for work-up of fall and treatment for sigmoid colitis and SMITH. Cardiology consulted for arrhythmia.     Arrhythmia / CAD  - EKG on admission with NSR, RBBB, repeat unchanged  -tele no events can discontine  - No signs of active ischemia  - Continue Asa  - D-dimer elevated with neg dopplers  - v/q: Very low probability of PE  - Echo as above 11/27/21 normal systolic 60% trace MR mild AS  - No signs of significant volume overload.     Dc planning     Teodora Michaels FNP-C  Cardiology NP  [id number] [phone]

## 2021-11-30 NOTE — PROGRESS NOTE ADULT - ATTENDING COMMENTS
-there is no evidence of acute ischemia.  -there is no evidence of significant arrhythmia.  -there is no evidence for meaningful  volume overload.  -dc planning
89 yo M w/ PMHx of Parkinson's Disease (on no medications), CAD (s/p stent), Bladder cancer (s/p tumor resection) presented to the ED after a fall admitted for work-up of fall and treatment for sigmoid colitis and SMITH. Cardiology consulted for arrhythmia.    - D-Ddimer elevated with neg dopplers  - v/q: Very low probability of PE  - F/u echo
Agree with plan as outlined above
Chart reviewed    Patient seen and examined    Agree with plan as outlined    89 yo M w/ PMHx of Parkinson's Disease (on no medications), CAD (s/p stent), Bladder cancer (s/p tumor resection) presented to the ED after a fall admitted for work-up of fall and treatment for sigmoid colitis and SMITH. Cardiology consulted for arrhythmia.     Arrhythmia / CAD  - EKG on admission with NSR, RBBB, repeat unchanged  - No signs of active ischemia  - Continue Asa  - D-Ddimer elevated with neg dopplers  - v/q: Very low probability of PE  - Echo as above 11/27/21 normal systolic 60% trace MR mild AS  - No signs of significant volume overload.
see below

## 2021-11-30 NOTE — DISCHARGE NOTE NURSING/CASE MANAGEMENT/SOCIAL WORK - PATIENT PORTAL LINK FT
You can access the FollowMyHealth Patient Portal offered by [facility] by registering at the following website: [email or web address]. By joining [email or web address]’s FollowMyHealth portal, you will also be able to view your health information using other applications (apps) compatible with our system.

## 2021-11-30 NOTE — PROGRESS NOTE ADULT - SUBJECTIVE AND OBJECTIVE BOX
[facility]  INFECTIOUS DISEASES   [address] [place] [address]  Tel: [phone]     Fax: [phone]  ======================================================  MD Ioana Villanueva Kaushal, MD Cho, Michelle, MD   ======================================================    [id number]  [facility]     Follow up; Sigmoid Colitis     Patient seen and examined and chart reviewed.   No more fever, diarrhea or abdominal pain.   Cultures negative.     PAST MEDICAL & SURGICAL HISTORY:  Shoulder fracture  Bladder cancer  Stented coronary artery  over 15 years ago as per patient  Parkinsons  S/P cardiac catheterization    Social Hx: no smoking, ETOH or drugs     FAMILY HISTORY:  FH: HTN (hypertension)    Allergies  clindamycin (Unknown)  Levaquin (Unknown)    Antibiotics:  cefTRIAXone   IVPB 1000 milliGRAM(s) IV Intermittent every 24 hours  metroNIDAZOLE  IVPB 500 milliGRAM(s) IV Intermittent every 8 hours     REVIEW OF SYSTEMS:  CONSTITUTIONAL:  No Fever or chills, weakness   HEENT:  No diplopia or blurred vision.  No sore throat or runny nose.  CARDIOVASCULAR:  No chest pain or SOB.  RESPIRATORY:  No cough, shortness of breath, PND or orthopnea.  GASTROINTESTINAL:  No nausea, vomiting or diarrhea. + abdominal discomfort  GENITOURINARY:  No dysuria, frequency or urgency. No Blood in urine  MUSCULOSKELETAL:  no joint aches, no muscle pain  SKIN:  No change in skin, hair or nails.  NEUROLOGIC:  No paresthesias, fasciculations, seizures or weakness.  PSYCHIATRIC:  No disorder of thought or mood.  ENDOCRINE:  No heat or cold intolerance, polyuria or polydipsia.  HEMATOLOGICAL:  No easy bruising or bleeding.     Physical Exam:  Vital Signs Last 24 Hrs  T(C): 36.6 (30 Nov 2021 11:59), Max: 37.6 (29 Nov 2021 20:37)  T(F): 97.9 (30 Nov 2021 11:59), Max: 99.7 (29 Nov 2021 20:37)  HR: 94 (30 Nov 2021 11:59) (83 - 94)  BP: 97/65 (30 Nov 2021 11:59) (97/65 - 126/62)  BP(mean): --  RR: 18 (30 Nov 2021 11:59) (17 - 18)  SpO2: 96% (30 Nov 2021 11:59) (93% - 97%)  GEN: NAD  HEENT: normocephalic and atraumatic. EOMI. PERRL.    NECK: Supple.  No lymphadenopathy   LUNGS: Clear to auscultation.  HEART: Regular rate and rhythm   ABDOMEN: Soft and nondistended.  Positive bowel sounds. mild lower abdominal tenderness   : No CVA tenderness  EXTREMITIES: Without any cyanosis, clubbing, rash, lesions or edema.  NEUROLOGIC: grossly intact.  PSYCHIATRIC: Appropriate affect .  SKIN: No rash     Labs:                        9.9    7.11  )-----------( 271      ( 30 Nov 2021 08:25 )             30.5     11-30    140  |  110<H>  |  25<H>  ----------------------------<  95  3.7   |  23  |  1.80<H>    Ca    8.9      30 Nov 2021 08:25  Phos  3.3     11-30  Mg     2.2     11-30    GI PCR Panel, Stool (collected 11-27-21 @ 11:58)  Source: .Stool Feces  Final Report (11-27-21 @ 14:15):    Cryptosporidium species    DETECTED by PCR    *******Please Note:*******    GI panel PCR evaluates for:    Campylobacter, Plesiomonas shigelloides, Salmonella,    Vibrio, Yersinia enterocolitica, Enteroaggregative    Escherichia coli (EAEC), Enteropathogenic E.coli (EPEC),    Enterotoxigenic E. coli (ETEC) lt/st, Shiga-like    toxin-producing E. coli (STEC) stx1/stx2,    Shigella/ Enteroinvasive E. coli (EIEC), Cryptosporidium,    Cyclospora cayetanensis, Entamoeba histolytica,    Giardia lamblia, Adenovirus F 40/41, Astrovirus,    Norovirus GI/GII, Rotavirus A, Sapovirus    Culture - Blood (collected 11-25-21 @ 01:15)  Source: .Blood Blood-Peripheral  Final Report (11-30-21 @ 02:00):    No Growth Final    Culture - Blood (collected 11-25-21 @ 01:15)  Source: .Blood Blood-Peripheral  Final Report (11-30-21 @ 02:00):    No Growth Final    Culture - Urine (collected 11-25-21 @ 00:53)  Source: Clean Catch Clean Catch (Midstream)  Final Report (11-26-21 @ 13:15):    <10,000 CFU/mL Normal Urogenital Dalila    WBC Count: 7.11 K/uL (11-30-21 @ 08:25)  WBC Count: 8.16 K/uL (11-28-21 @ 08:48)  WBC Count: 10.72 K/uL (11-27-21 @ 07:30)  WBC Count: 10.93 K/uL (11-26-21 @ 07:46)    Creatinine, Serum: 1.80 mg/dL (11-30-21 @ 08:25)  Creatinine, Serum: 1.80 mg/dL (11-28-21 @ 08:48)  Creatinine, Serum: 1.80 mg/dL (11-27-21 @ 17:34)  Creatinine, Serum: 1.80 mg/dL (11-27-21 @ 07:30)  Creatinine, Serum: 1.90 mg/dL (11-26-21 @ 07:46)    Ferritin, Serum: 334 ng/mL (11-26-21 @ 12:25)    COVID-19 PCR: NotDetec (11-29-21 @ 12:34)  COVID-19 PCR: NotDetec (11-24-21 @ 22:27)    All imaging and other data have been reviewed.  < from: CT Renal Stone Hunt (11.24.21 @ 23:16) >  IMPRESSION:  1. No nephrolithiasis, hydronephrosis or obstructive uropathy.  2. Mid to distal sigmoid colitis which is infectious, inflammatory or ischemic in etiology.    Assessment and Plan:   89 yo man with PMH of CAD, Parkinson's Disease and Bladder cancer s/p tumor resection was admitted after a fall. No LOC. He denies any symptoms except for weakness and mild lower abdominal pain. No nausea, vomiting, diarrhea or dysuria. Had some work up done, showed possible left sided colitis. Fall could be related to infectious process, since he had fever and colitis, will need to be ruled out for bacteremia.   UA negative  Tmax 103.1  Mild leukocytosis 10.4  CT with mid to distal sigmoid colitis   GI PCR with Cryptosporidium     1- Fever and colitis  - Blood culture x 2 NGTD  - UA and UC negative   - Tolerating regular diet   - Cryptosporidium in stool can cause diarrhea, fever and colitis seen in CT   - Watch off Antibiotics     2- Fall and weakness   - Neurology consult noted   - Head CT noted  - Had a neg VQ scan and doppler     Will sign off please call with any question.     Mario Anderson MD  Division of Infectious Diseases   Cell [phone] between 8am and 6pm   After 6pm and weekends please call ID service at [phone].

## 2021-11-30 NOTE — PROGRESS NOTE ADULT - PROVIDER SPECIALTY LIST ADULT
Hospitalist
Infectious Disease
Nephrology
Neurology
Cardiology
Infectious Disease
Nephrology
Neurology
Cardiology
Cardiology
Infectious Disease
Infectious Disease
Nephrology
Neurology
Cardiology
Nephrology
Hospitalist

## 2021-11-30 NOTE — DISCHARGE NOTE NURSING/CASE MANAGEMENT/SOCIAL WORK - NSDCPEFALRISK_GEN_ALL_CORE
For information on Fall & Injury Prevention, visit: [email or web address] OR  [email or web address] OR  https://www.cdc.gov/steadi/patient.html

## 2021-11-30 NOTE — PROGRESS NOTE ADULT - SUBJECTIVE AND OBJECTIVE BOX
Neurology follow up note    HAYDER [address]      Interval History:    Patient feels ok no new complaints.    Allergies    clindamycin (Unknown)  Levaquin (Unknown)    Intolerances        MEDICATIONS    acetaminophen     Tablet .. 650 milliGRAM(s) Oral every 6 hours PRN  aspirin  chewable 81 milliGRAM(s) Oral daily  heparin   Injectable 5000 Unit(s) SubCutaneous every 8 hours  lidocaine   4% Patch 1 Patch Transdermal every 24 hours  melatonin 3 milliGRAM(s) Oral at bedtime PRN  saccharomyces boulardii 250 milliGRAM(s) Oral two times a day  tamsulosin 0.4 milliGRAM(s) Oral at bedtime              Vital Signs Last 24 Hrs  T(C): 36.6 (30 Nov 2021 11:59), Max: 37.6 (29 Nov 2021 20:37)  T(F): 97.9 (30 Nov 2021 11:59), Max: 99.7 (29 Nov 2021 20:37)  HR: 94 (30 Nov 2021 11:59) (83 - 94)  BP: 97/65 (30 Nov 2021 11:59) (97/65 - 126/62)  BP(mean): --  RR: 18 (30 Nov 2021 11:59) (17 - 18)  SpO2: 96% (30 Nov 2021 11:59) (93% - 97%)    REVIEW OF SYSTEMS:  Constitutional:  The patient denies fever, chills, or night sweats.  Head:  No headaches.  Eyes:  No double vision or blurry vision.  Ears:  No ringing in the ears.  Neck:  No neck pain.  Respiratory:  No shortness of breath.  Cardiovascular:  No chest pain.  Abdomen:  No nausea, vomiting, or abdominal pain.  Extremities/Neurological:  No numbness or tingling.  Musculoskeletal:  Occasional joint pain.    PHYSICAL EXAMINATION:   HEENT:  Head:  Normocephalic, atraumatic.  Eyes:  No scleral icterus.  Ears:  Hearing bilaterally was intact.  NECK:  Supple.  RESPIRATORY:  Good air entry bilaterally.  CARDIOVASCULAR:  S1 and S2 heard.  ABDOMEN:  Soft and nontender.  EXTREMITIES:  No clubbing or cyanosis was noted.      NEUROLOGIC:  The patient is awake and alert.  Location was hospital,    Was able to follow complex commands, took right hand touch left ear.  Speech was fluent.  Smile was symmetric.  Motor:  Bilateral upper were 4/5, bilateral lower were 4-/5.  The patient has slight resting tremors.  Slight bradykinesia.              LABS:  CBC Full  -  ( 30 Nov 2021 08:25 )  WBC Count : 7.11 K/uL  RBC Count : 3.19 M/uL  Hemoglobin : 9.9 g/dL  Hematocrit : 30.5 %  Platelet Count - Automated : 271 K/uL  Mean Cell Volume : 95.6 fl  Mean Cell Hemoglobin : 31.0 pg  Mean Cell Hemoglobin Concentration : 32.5 gm/dL  Auto Neutrophil # : x  Auto Lymphocyte # : x  Auto Monocyte # : x  Auto Eosinophil # : x  Auto Basophil # : x  Auto Neutrophil % : x  Auto Lymphocyte % : x  Auto Monocyte % : x  Auto Eosinophil % : x  Auto Basophil % : x      11-30    140  |  110<H>  |  25<H>  ----------------------------<  95  3.7   |  23  |  1.80<H>    Ca    8.9      30 Nov 2021 08:25  Phos  3.3     11-30  Mg     2.2     11-30      Hemoglobin A1C:       Vitamin B12         RADIOLOGY    ANALYSIS AND PLAN:  A 89-year-old with an episode of dizziness, fall, and Parkinson's.  For episode of fall, suspect this could be secondary to age-related changes, balance issues, underlying Parkinson's, slight dehydration, possibly episode of presyncopal event.  The examination does not show any signs at present to suggest a new cerebrovascular accident has ensued.  Telemetry evaluation as needed.  Monitor systolic blood pressure.  For mild cognitive impairment supportive treatment   For history of Parkinson's disease, the patient appears to be stable, currently on no medications.  For episodes of dizziness, monitor systolic blood pressure.  physical therapy  as tolerated   no new events      Attempting to speak with the spouse, Keyla, at [phone]. 11/25  She appears to have some underlying mild cognitive impairment as well.    Greater than 22 minutes of time was spent with the patient, plan of care, reviewing data, speaking to the multidisciplinary healthcare team with greater than 50% of that time in counseling and care coordination.    Thank you for the courtesy of this consultation.

## 2021-12-22 PROCEDURE — 83605 ASSAY OF LACTIC ACID: CPT

## 2021-12-22 PROCEDURE — 80048 BASIC METABOLIC PNL TOTAL CA: CPT

## 2021-12-22 PROCEDURE — 76775 US EXAM ABDO BACK WALL LIM: CPT

## 2021-12-22 PROCEDURE — 86769 SARS-COV-2 COVID-19 ANTIBODY: CPT

## 2021-12-22 PROCEDURE — 87086 URINE CULTURE/COLONY COUNT: CPT

## 2021-12-22 PROCEDURE — 74176 CT ABD & PELVIS W/O CONTRAST: CPT | Mod: ME

## 2021-12-22 PROCEDURE — 83935 ASSAY OF URINE OSMOLALITY: CPT

## 2021-12-22 PROCEDURE — 85027 COMPLETE CBC AUTOMATED: CPT

## 2021-12-22 PROCEDURE — G1004: CPT

## 2021-12-22 PROCEDURE — 80053 COMPREHEN METABOLIC PANEL: CPT

## 2021-12-22 PROCEDURE — 70450 CT HEAD/BRAIN W/O DYE: CPT | Mod: ME

## 2021-12-22 PROCEDURE — 93970 EXTREMITY STUDY: CPT

## 2021-12-22 PROCEDURE — 85379 FIBRIN DEGRADATION QUANT: CPT

## 2021-12-22 PROCEDURE — 84156 ASSAY OF PROTEIN URINE: CPT

## 2021-12-22 PROCEDURE — 84300 ASSAY OF URINE SODIUM: CPT

## 2021-12-22 PROCEDURE — 85025 COMPLETE CBC W/AUTO DIFF WBC: CPT

## 2021-12-22 PROCEDURE — 83540 ASSAY OF IRON: CPT

## 2021-12-22 PROCEDURE — 82436 ASSAY OF URINE CHLORIDE: CPT

## 2021-12-22 PROCEDURE — 78582 LUNG VENTILAT&PERFUS IMAGING: CPT

## 2021-12-22 PROCEDURE — 87040 BLOOD CULTURE FOR BACTERIA: CPT

## 2021-12-22 PROCEDURE — 97161 PT EVAL LOW COMPLEX 20 MIN: CPT

## 2021-12-22 PROCEDURE — 82570 ASSAY OF URINE CREATININE: CPT

## 2021-12-22 PROCEDURE — 84540 ASSAY OF URINE/UREA-N: CPT

## 2021-12-22 PROCEDURE — 83880 ASSAY OF NATRIURETIC PEPTIDE: CPT

## 2021-12-22 PROCEDURE — 82607 VITAMIN B-12: CPT

## 2021-12-22 PROCEDURE — 84484 ASSAY OF TROPONIN QUANT: CPT

## 2021-12-22 PROCEDURE — 82728 ASSAY OF FERRITIN: CPT

## 2021-12-22 PROCEDURE — 87635 SARS-COV-2 COVID-19 AMP PRB: CPT

## 2021-12-22 PROCEDURE — 97116 GAIT TRAINING THERAPY: CPT

## 2021-12-22 PROCEDURE — 82746 ASSAY OF FOLIC ACID SERUM: CPT

## 2021-12-22 PROCEDURE — 83550 IRON BINDING TEST: CPT

## 2021-12-22 PROCEDURE — 85610 PROTHROMBIN TIME: CPT

## 2021-12-22 PROCEDURE — A9540: CPT

## 2021-12-22 PROCEDURE — U0005: CPT

## 2021-12-22 PROCEDURE — 82550 ASSAY OF CK (CPK): CPT

## 2021-12-22 PROCEDURE — 85730 THROMBOPLASTIN TIME PARTIAL: CPT

## 2021-12-22 PROCEDURE — 82553 CREATINE MB FRACTION: CPT

## 2021-12-22 PROCEDURE — 71045 X-RAY EXAM CHEST 1 VIEW: CPT

## 2021-12-22 PROCEDURE — 93306 TTE W/DOPPLER COMPLETE: CPT

## 2021-12-22 PROCEDURE — 81001 URINALYSIS AUTO W/SCOPE: CPT

## 2021-12-22 PROCEDURE — 84100 ASSAY OF PHOSPHORUS: CPT

## 2021-12-22 PROCEDURE — 84443 ASSAY THYROID STIM HORMONE: CPT

## 2021-12-22 PROCEDURE — 99285 EMERGENCY DEPT VISIT HI MDM: CPT | Mod: 25

## 2021-12-22 PROCEDURE — A9567: CPT

## 2021-12-22 PROCEDURE — 87507 IADNA-DNA/RNA PROBE TQ 12-25: CPT

## 2021-12-22 PROCEDURE — 83735 ASSAY OF MAGNESIUM: CPT

## 2021-12-22 PROCEDURE — 93005 ELECTROCARDIOGRAM TRACING: CPT

## 2021-12-22 PROCEDURE — 82962 GLUCOSE BLOOD TEST: CPT

## 2021-12-22 PROCEDURE — 36415 COLL VENOUS BLD VENIPUNCTURE: CPT

## 2021-12-22 PROCEDURE — U0003: CPT

## 2021-12-29 ENCOUNTER — INPATIENT (INPATIENT)
Facility: HOSPITAL | Age: 86
LOS: 11 days | Discharge: HOME | DRG: 177 | End: 2022-01-10
Attending: INTERNAL MEDICINE | Admitting: INTERNAL MEDICINE
Payer: MEDICARE

## 2021-12-29 VITALS
DIASTOLIC BLOOD PRESSURE: 69 MMHG | OXYGEN SATURATION: 93 % | HEART RATE: 97 BPM | TEMPERATURE: 98 F | SYSTOLIC BLOOD PRESSURE: 174 MMHG | RESPIRATION RATE: 18 BRPM | WEIGHT: 113.54 LBS | HEIGHT: 66 IN

## 2021-12-29 DIAGNOSIS — Z98.890 OTHER SPECIFIED POSTPROCEDURAL STATES: Chronic | ICD-10-CM

## 2021-12-29 DIAGNOSIS — J18.9 PNEUMONIA, UNSPECIFIED ORGANISM: ICD-10-CM

## 2021-12-29 PROBLEM — G20 PARKINSON'S DISEASE: Chronic | Status: ACTIVE | Noted: 2021-11-24

## 2021-12-29 LAB
ALBUMIN SERPL ELPH-MCNC: 2.7 G/DL — LOW (ref 3.3–5)
ALP SERPL-CCNC: 43 U/L — SIGNIFICANT CHANGE UP (ref 40–120)
ALT FLD-CCNC: 25 U/L — SIGNIFICANT CHANGE UP (ref 12–78)
ANION GAP SERPL CALC-SCNC: 7 MMOL/L — SIGNIFICANT CHANGE UP (ref 5–17)
ANISOCYTOSIS BLD QL: SLIGHT — SIGNIFICANT CHANGE UP
APPEARANCE UR: CLEAR — SIGNIFICANT CHANGE UP
APTT BLD: 32.1 SEC — SIGNIFICANT CHANGE UP (ref 27.5–35.5)
AST SERPL-CCNC: 49 U/L — HIGH (ref 15–37)
BACTERIA # UR AUTO: ABNORMAL
BASOPHILS # BLD AUTO: 0 K/UL — SIGNIFICANT CHANGE UP (ref 0–0.2)
BASOPHILS NFR BLD AUTO: 0 % — SIGNIFICANT CHANGE UP (ref 0–2)
BILIRUB SERPL-MCNC: 0.3 MG/DL — SIGNIFICANT CHANGE UP (ref 0.2–1.2)
BILIRUB UR-MCNC: NEGATIVE — SIGNIFICANT CHANGE UP
BUN SERPL-MCNC: 39 MG/DL — HIGH (ref 7–23)
CALCIUM SERPL-MCNC: 8.7 MG/DL — SIGNIFICANT CHANGE UP (ref 8.5–10.1)
CHLORIDE SERPL-SCNC: 119 MMOL/L — HIGH (ref 96–108)
CO2 SERPL-SCNC: 25 MMOL/L — SIGNIFICANT CHANGE UP (ref 22–31)
COLOR SPEC: YELLOW — SIGNIFICANT CHANGE UP
CREAT SERPL-MCNC: 2.6 MG/DL — HIGH (ref 0.5–1.3)
DIFF PNL FLD: ABNORMAL
ELLIPTOCYTES BLD QL SMEAR: SLIGHT — SIGNIFICANT CHANGE UP
EOSINOPHIL # BLD AUTO: 0 K/UL — SIGNIFICANT CHANGE UP (ref 0–0.5)
EOSINOPHIL NFR BLD AUTO: 0 % — SIGNIFICANT CHANGE UP (ref 0–6)
EPI CELLS # UR: ABNORMAL
ERYTHROCYTE [SEDIMENTATION RATE] IN BLOOD: 42 MM/HR — HIGH (ref 0–20)
GLUCOSE SERPL-MCNC: 116 MG/DL — HIGH (ref 70–99)
GLUCOSE UR QL: 100 MG/DL
GRAN CASTS # UR COMP ASSIST: ABNORMAL /LPF
HCT VFR BLD CALC: 32.3 % — LOW (ref 39–50)
HGB BLD-MCNC: 10.5 G/DL — LOW (ref 13–17)
HYPOCHROMIA BLD QL: SLIGHT — SIGNIFICANT CHANGE UP
INR BLD: 1.03 RATIO — SIGNIFICANT CHANGE UP (ref 0.88–1.16)
KETONES UR-MCNC: NEGATIVE — SIGNIFICANT CHANGE UP
LACTATE SERPL-SCNC: 1.6 MMOL/L — SIGNIFICANT CHANGE UP (ref 0.7–2)
LEUKOCYTE ESTERASE UR-ACNC: NEGATIVE — SIGNIFICANT CHANGE UP
LYMPHOCYTES # BLD AUTO: 0.57 K/UL — LOW (ref 1–3.3)
LYMPHOCYTES # BLD AUTO: 25 % — SIGNIFICANT CHANGE UP (ref 13–44)
MCHC RBC-ENTMCNC: 31.3 PG — SIGNIFICANT CHANGE UP (ref 27–34)
MCHC RBC-ENTMCNC: 32.5 GM/DL — SIGNIFICANT CHANGE UP (ref 32–36)
MCV RBC AUTO: 96.4 FL — SIGNIFICANT CHANGE UP (ref 80–100)
MICROCYTES BLD QL: SLIGHT — SIGNIFICANT CHANGE UP
MONOCYTES # BLD AUTO: 0.18 K/UL — SIGNIFICANT CHANGE UP (ref 0–0.9)
MONOCYTES NFR BLD AUTO: 8 % — SIGNIFICANT CHANGE UP (ref 2–14)
NEUTROPHILS # BLD AUTO: 1.52 K/UL — LOW (ref 1.8–7.4)
NEUTROPHILS NFR BLD AUTO: 62 % — SIGNIFICANT CHANGE UP (ref 43–77)
NEUTS BAND # BLD: 5 % — SIGNIFICANT CHANGE UP (ref 0–8)
NITRITE UR-MCNC: NEGATIVE — SIGNIFICANT CHANGE UP
NRBC # BLD: 0 — SIGNIFICANT CHANGE UP
NRBC # BLD: SIGNIFICANT CHANGE UP /100 WBCS (ref 0–0)
PH UR: 7 — SIGNIFICANT CHANGE UP (ref 5–8)
PLAT MORPH BLD: NORMAL — SIGNIFICANT CHANGE UP
PLATELET # BLD AUTO: 182 K/UL — SIGNIFICANT CHANGE UP (ref 150–400)
POIKILOCYTOSIS BLD QL AUTO: SLIGHT — SIGNIFICANT CHANGE UP
POTASSIUM SERPL-MCNC: 3.5 MMOL/L — SIGNIFICANT CHANGE UP (ref 3.5–5.3)
POTASSIUM SERPL-SCNC: 3.5 MMOL/L — SIGNIFICANT CHANGE UP (ref 3.5–5.3)
PROT SERPL-MCNC: 7.3 G/DL — SIGNIFICANT CHANGE UP (ref 6–8.3)
PROT UR-MCNC: 100
PROTHROM AB SERPL-ACNC: 12 SEC — SIGNIFICANT CHANGE UP (ref 10.6–13.6)
RAPID RVP RESULT: DETECTED
RBC # BLD: 3.35 M/UL — LOW (ref 4.2–5.8)
RBC # FLD: 20.6 % — HIGH (ref 10.3–14.5)
RBC BLD AUTO: ABNORMAL
RBC CASTS # UR COMP ASSIST: SIGNIFICANT CHANGE UP /HPF (ref 0–4)
SARS-COV-2 RNA SPEC QL NAA+PROBE: DETECTED
SODIUM SERPL-SCNC: 151 MMOL/L — HIGH (ref 135–145)
SP GR SPEC: 1.01 — SIGNIFICANT CHANGE UP (ref 1.01–1.02)
UROBILINOGEN FLD QL: NEGATIVE — SIGNIFICANT CHANGE UP
WBC # BLD: 2.27 K/UL — LOW (ref 3.8–10.5)
WBC # FLD AUTO: 2.27 K/UL — LOW (ref 3.8–10.5)
WBC UR QL: SIGNIFICANT CHANGE UP

## 2021-12-29 PROCEDURE — 93010 ELECTROCARDIOGRAM REPORT: CPT

## 2021-12-29 PROCEDURE — 99285 EMERGENCY DEPT VISIT HI MDM: CPT | Mod: CS

## 2021-12-29 PROCEDURE — 71045 X-RAY EXAM CHEST 1 VIEW: CPT | Mod: 26

## 2021-12-29 RX ORDER — PIPERACILLIN AND TAZOBACTAM 4; .5 G/20ML; G/20ML
3.38 INJECTION, POWDER, LYOPHILIZED, FOR SOLUTION INTRAVENOUS ONCE
Refills: 0 | Status: COMPLETED | OUTPATIENT
Start: 2021-12-29 | End: 2021-12-29

## 2021-12-29 RX ORDER — PIPERACILLIN AND TAZOBACTAM 4; .5 G/20ML; G/20ML
3.38 INJECTION, POWDER, LYOPHILIZED, FOR SOLUTION INTRAVENOUS EVERY 8 HOURS
Refills: 0 | Status: DISCONTINUED | OUTPATIENT
Start: 2021-12-29 | End: 2021-12-29

## 2021-12-29 RX ORDER — PIPERACILLIN AND TAZOBACTAM 4; .5 G/20ML; G/20ML
3.38 INJECTION, POWDER, LYOPHILIZED, FOR SOLUTION INTRAVENOUS EVERY 8 HOURS
Refills: 0 | Status: DISCONTINUED | OUTPATIENT
Start: 2021-12-29 | End: 2022-01-05

## 2021-12-29 RX ORDER — VANCOMYCIN HCL 1 G
1000 VIAL (EA) INTRAVENOUS ONCE
Refills: 0 | Status: COMPLETED | OUTPATIENT
Start: 2021-12-29 | End: 2021-12-29

## 2021-12-29 RX ORDER — SODIUM CHLORIDE 9 MG/ML
2000 INJECTION INTRAMUSCULAR; INTRAVENOUS; SUBCUTANEOUS ONCE
Refills: 0 | Status: COMPLETED | OUTPATIENT
Start: 2021-12-29 | End: 2021-12-29

## 2021-12-29 RX ORDER — PIPERACILLIN AND TAZOBACTAM 4; .5 G/20ML; G/20ML
3.38 INJECTION, POWDER, LYOPHILIZED, FOR SOLUTION INTRAVENOUS EVERY 12 HOURS
Refills: 0 | Status: COMPLETED | OUTPATIENT
Start: 2021-12-30 | End: 2022-01-05

## 2021-12-29 RX ORDER — ACETAMINOPHEN 500 MG
1000 TABLET ORAL ONCE
Refills: 0 | Status: COMPLETED | OUTPATIENT
Start: 2021-12-29 | End: 2021-12-29

## 2021-12-29 RX ORDER — LACTOBACILLUS ACIDOPHILUS 100MM CELL
1 CAPSULE ORAL
Refills: 0 | Status: DISCONTINUED | OUTPATIENT
Start: 2021-12-29 | End: 2022-01-10

## 2021-12-29 RX ORDER — ACETAMINOPHEN 500 MG
650 TABLET ORAL EVERY 6 HOURS
Refills: 0 | Status: DISCONTINUED | OUTPATIENT
Start: 2021-12-29 | End: 2022-01-10

## 2021-12-29 RX ORDER — TAMSULOSIN HYDROCHLORIDE 0.4 MG/1
0.4 CAPSULE ORAL AT BEDTIME
Refills: 0 | Status: DISCONTINUED | OUTPATIENT
Start: 2021-12-29 | End: 2022-01-10

## 2021-12-29 RX ORDER — ASPIRIN/CALCIUM CARB/MAGNESIUM 324 MG
81 TABLET ORAL DAILY
Refills: 0 | Status: DISCONTINUED | OUTPATIENT
Start: 2021-12-29 | End: 2022-01-10

## 2021-12-29 RX ADMIN — Medication 250 MILLIGRAM(S): at 16:47

## 2021-12-29 RX ADMIN — PIPERACILLIN AND TAZOBACTAM 200 GRAM(S): 4; .5 INJECTION, POWDER, LYOPHILIZED, FOR SOLUTION INTRAVENOUS at 16:14

## 2021-12-29 RX ADMIN — Medication 400 MILLIGRAM(S): at 18:27

## 2021-12-29 RX ADMIN — SODIUM CHLORIDE 2000 MILLILITER(S): 9 INJECTION INTRAMUSCULAR; INTRAVENOUS; SUBCUTANEOUS at 16:13

## 2021-12-29 NOTE — H&P ADULT - NSHPLABSRESULTS_GEN_ALL_CORE
x< from: Xray Chest 1 View AP/PA (11.26.21 @ 16:03) >      EXAM:  XR CHEST AP OR PA 1V                            PROCEDURE DATE:  11/26/2021          INTERPRETATION:  Clinical history: 89-year-old male, protocoled for V/Q.    Portable view of the chest is correlated with the abdominal CT and demonstrates a normal cardiac silhouette and normal pulmonary vasculature with no consolidation, effusion, pneumothorax or acute osseous finding.    Mild bilateral lower lobe patchy infiltrate/platelike atelectasis.    IMPRESSION:  Bibasilar platelike atelectasis/possible interstitial infiltrate    --- End of Report ---          < end of copied text >

## 2021-12-29 NOTE — H&P ADULT - ASSESSMENT
Chief Complaint: shortness of breath.    · Chief Complaint: The patient is a 89y Male complaining of shortness of breath.  · HPI Objective Statement: 89yo M w/ PMHx of Parkinson's Disease (on no medications), CAD (s/p stent), Bladder cancer (s/p tumor resection) sent from [facility] for pna, lethargy, fevers.  pt currently on ABx cefepime, doxy, flagyl for sacral ulcer and colitis, has xr showing bl pna. sent to ed  pt does not provide history      PAST MEDICAL/SURGICAL/FAMILY/SOCIAL HISTORY:    Past Medical, Past Surgical, and Family History:  PAST MEDICAL HISTORY:  Bladder cancer     Parkinsons     Shoulder fracture     Stented coronary artery over 15 years ago as per patient.     PAST SURGICAL HISTORY:  S/P cardiac catheterization.       patient with infiltrate on cxr and cough and fever   and poor po intake    do iv abx ,   ID eval    hydrate   dvt and gi prophylax  prognosis is poor

## 2021-12-29 NOTE — ED ADULT NURSE NOTE - OBJECTIVE STATEMENT
Patient received via EMS c/o fever and SOB. Patient is lethargic, responds to verbal. Patient has sacral wound appearance along unstageable. 24g IV placed by EMS, Patient has rectal temp of 101.2, interventions implemented, safety precautions in place, awaiting evaluation.

## 2021-12-29 NOTE — ED PROVIDER NOTE - PHYSICAL EXAMINATION
Gen: lethrgic, responds to verbal stimuli   Head: NC/AT  Neck: trachea midline  cv: rrr  Resp:  diminished   abd nontender   Ext: no deformities  Neuro:  A&O appears non focal  Skin:  Warm and dry as visualized  Psych:  Normal affect and mood

## 2021-12-29 NOTE — ED PROVIDER NOTE - OBJECTIVE STATEMENT
89yo M w/ PMHx of Parkinson's Disease (on no medications), CAD (s/p stent), Bladder cancer (s/p tumor resection) sent from [facility] for pna, lethargy, fevers.  pt currently on ABx cefepime, doxy, flagyl for sacral ulcer and colitis, has xr showing bl pna. sent to ed  pt does not provide history

## 2021-12-29 NOTE — ED PROVIDER NOTE - CLINICAL SUMMARY MEDICAL DECISION MAKING FREE TEXT BOX
89yo M w/ PMHx of Parkinson's Disease (on no medications), CAD (s/p stent), Bladder cancer (s/p tumor resection) sent from [place] [facility] for bl pna, lethargy,f sasha and sob will check labs, cx, abx, admit

## 2021-12-29 NOTE — PROGRESS NOTE ADULT - SUBJECTIVE AND OBJECTIVE BOX
Infectious Disease Plan of Care Note    Chart reviewed  febrile to 100.9, s/p 2L NS in ED, s/p vanc & zosyn  Patient w/ positive SARS-CoV-2 PCR  leukopenia and lymphopenia c/w COVID infection  airborne/ contact isolation per infection control protocol  CXR w/o obvious focal consolidation  monitor O2 sats  if sats<94% on RA start remdesivir  if requiring supplemental O2 would start dexamethasone  trend inflammatory markers  UA contaminated- mod epithelial cells  f/u cultures  ok to continue zosyn for now  full consult to follow tomorrow by Dr. Maico Stoddard M.D.  [phone]  [facility], Division of Infectious Diseases  [phone]  After 5pm on weekdays and all day on weekends - please call [phone]

## 2021-12-29 NOTE — H&P ADULT - HISTORY OF PRESENT ILLNESS
Chief Complaint: shortness of breath.    · Chief Complaint: The patient is a 89y Male complaining of shortness of breath.  · HPI Objective Statement: 89yo M w/ PMHx of Parkinson's Disease (on no medications), CAD (s/p stent), Bladder cancer (s/p tumor resection) sent from [facility] for pna, lethargy, fevers.  pt currently on ABx cefepime, doxy, flagyl for sacral ulcer and colitis, has xr showing bl pna. sent to ed  pt does not provide history      PAST MEDICAL/SURGICAL/FAMILY/SOCIAL HISTORY:    Past Medical, Past Surgical, and Family History:  PAST MEDICAL HISTORY:  Bladder cancer     Parkinsons     Shoulder fracture     Stented coronary artery over 15 years ago as per patient.     PAST SURGICAL HISTORY:  S/P cardiac catheterization.       patient with infiltrate on cxr and cough and fever   and poor po intake    do iv abx ,   ID eval    hydrate   dvt and gi prophylax  prognosis is poor

## 2021-12-30 LAB
ANION GAP SERPL CALC-SCNC: 5 MMOL/L — SIGNIFICANT CHANGE UP (ref 5–17)
BASE EXCESS BLDV CALC-SCNC: -2.9 MMOL/L — LOW (ref -2–3)
BASOPHILS # BLD AUTO: 0 K/UL — SIGNIFICANT CHANGE UP (ref 0–0.2)
BASOPHILS NFR BLD AUTO: 0 % — SIGNIFICANT CHANGE UP (ref 0–2)
BLOOD GAS COMMENTS, VENOUS: SIGNIFICANT CHANGE UP
BLOOD GAS COMMENTS, VENOUS: SIGNIFICANT CHANGE UP
BUN SERPL-MCNC: 36 MG/DL — HIGH (ref 7–23)
CALCIUM SERPL-MCNC: 8.1 MG/DL — LOW (ref 8.5–10.1)
CHLORIDE SERPL-SCNC: 121 MMOL/L — HIGH (ref 96–108)
CO2 SERPL-SCNC: 25 MMOL/L — SIGNIFICANT CHANGE UP (ref 22–31)
CREAT SERPL-MCNC: 2.3 MG/DL — HIGH (ref 0.5–1.3)
CULTURE RESULTS: NO GROWTH — SIGNIFICANT CHANGE UP
EOSINOPHIL # BLD AUTO: 0.02 K/UL — SIGNIFICANT CHANGE UP (ref 0–0.5)
EOSINOPHIL NFR BLD AUTO: 0.8 % — SIGNIFICANT CHANGE UP (ref 0–6)
GLUCOSE SERPL-MCNC: 103 MG/DL — HIGH (ref 70–99)
HCO3 BLDV-SCNC: 23 MMOL/L — SIGNIFICANT CHANGE UP (ref 22–29)
HCT VFR BLD CALC: 28.1 % — LOW (ref 39–50)
HGB BLD-MCNC: 8.8 G/DL — LOW (ref 13–17)
IMM GRANULOCYTES NFR BLD AUTO: 6.7 % — HIGH (ref 0–1.5)
LYMPHOCYTES # BLD AUTO: 0.52 K/UL — LOW (ref 1–3.3)
LYMPHOCYTES # BLD AUTO: 20.4 % — SIGNIFICANT CHANGE UP (ref 13–44)
MANUAL SMEAR VERIFICATION: SIGNIFICANT CHANGE UP
MCHC RBC-ENTMCNC: 30.6 PG — SIGNIFICANT CHANGE UP (ref 27–34)
MCHC RBC-ENTMCNC: 31.3 GM/DL — LOW (ref 32–36)
MCV RBC AUTO: 97.6 FL — SIGNIFICANT CHANGE UP (ref 80–100)
MONOCYTES # BLD AUTO: 0.29 K/UL — SIGNIFICANT CHANGE UP (ref 0–0.9)
MONOCYTES NFR BLD AUTO: 11.4 % — SIGNIFICANT CHANGE UP (ref 2–14)
NEUTROPHILS # BLD AUTO: 1.55 K/UL — LOW (ref 1.8–7.4)
NEUTROPHILS NFR BLD AUTO: 60.7 % — SIGNIFICANT CHANGE UP (ref 43–77)
NRBC # BLD: 0 /100 WBCS — SIGNIFICANT CHANGE UP (ref 0–0)
NT-PROBNP SERPL-SCNC: 1053 PG/ML — HIGH (ref 0–450)
PCO2 BLDV: 45 MMHG — SIGNIFICANT CHANGE UP (ref 42–55)
PH BLDV: 7.32 — SIGNIFICANT CHANGE UP (ref 7.32–7.43)
PLATELET # BLD AUTO: 126 K/UL — LOW (ref 150–400)
PO2 BLDV: 112 MMHG — HIGH (ref 25–45)
POTASSIUM SERPL-MCNC: 3.3 MMOL/L — LOW (ref 3.5–5.3)
POTASSIUM SERPL-SCNC: 3.3 MMOL/L — LOW (ref 3.5–5.3)
PROCALCITONIN SERPL-MCNC: 0.12 NG/ML — HIGH (ref 0–0.04)
RBC # BLD: 2.88 M/UL — LOW (ref 4.2–5.8)
RBC # FLD: 20.4 % — HIGH (ref 10.3–14.5)
SAO2 % BLDV: 99.5 % — HIGH (ref 67–88)
SODIUM SERPL-SCNC: 151 MMOL/L — HIGH (ref 135–145)
SPECIMEN SOURCE: SIGNIFICANT CHANGE UP
WBC # BLD: 2.55 K/UL — LOW (ref 3.8–10.5)
WBC # FLD AUTO: 2.55 K/UL — LOW (ref 3.8–10.5)

## 2021-12-30 RX ORDER — MAGNESIUM HYDROXIDE 400 MG/1
30 TABLET, CHEWABLE ORAL
Qty: 0 | Refills: 0 | DISCHARGE

## 2021-12-30 RX ORDER — ENOXAPARIN SODIUM 100 MG/ML
30 INJECTION SUBCUTANEOUS DAILY
Refills: 0 | Status: DISCONTINUED | OUTPATIENT
Start: 2021-12-30 | End: 2022-01-03

## 2021-12-30 RX ORDER — REMDESIVIR 5 MG/ML
INJECTION INTRAVENOUS
Refills: 0 | Status: COMPLETED | OUTPATIENT
Start: 2021-12-30 | End: 2022-01-03

## 2021-12-30 RX ORDER — MIRTAZAPINE 45 MG/1
1 TABLET, ORALLY DISINTEGRATING ORAL
Qty: 0 | Refills: 0 | DISCHARGE

## 2021-12-30 RX ORDER — DEXAMETHASONE 0.5 MG/5ML
6 ELIXIR ORAL DAILY
Refills: 0 | Status: COMPLETED | OUTPATIENT
Start: 2021-12-30 | End: 2022-01-09

## 2021-12-30 RX ORDER — REMDESIVIR 5 MG/ML
100 INJECTION INTRAVENOUS EVERY 24 HOURS
Refills: 0 | Status: COMPLETED | OUTPATIENT
Start: 2021-12-31 | End: 2022-01-03

## 2021-12-30 RX ORDER — REMDESIVIR 5 MG/ML
200 INJECTION INTRAVENOUS EVERY 24 HOURS
Refills: 0 | Status: COMPLETED | OUTPATIENT
Start: 2021-12-30 | End: 2021-12-30

## 2021-12-30 RX ADMIN — Medication 81 MILLIGRAM(S): at 11:32

## 2021-12-30 RX ADMIN — ENOXAPARIN SODIUM 30 MILLIGRAM(S): 100 INJECTION SUBCUTANEOUS at 11:32

## 2021-12-30 RX ADMIN — Medication 1 TABLET(S): at 06:29

## 2021-12-30 RX ADMIN — TAMSULOSIN HYDROCHLORIDE 0.4 MILLIGRAM(S): 0.4 CAPSULE ORAL at 21:03

## 2021-12-30 RX ADMIN — Medication 1 TABLET(S): at 21:04

## 2021-12-30 RX ADMIN — Medication 650 MILLIGRAM(S): at 21:04

## 2021-12-30 RX ADMIN — PIPERACILLIN AND TAZOBACTAM 25 GRAM(S): 4; .5 INJECTION, POWDER, LYOPHILIZED, FOR SOLUTION INTRAVENOUS at 06:29

## 2021-12-30 RX ADMIN — Medication 6 MILLIGRAM(S): at 17:25

## 2021-12-30 RX ADMIN — TAMSULOSIN HYDROCHLORIDE 0.4 MILLIGRAM(S): 0.4 CAPSULE ORAL at 00:45

## 2021-12-30 RX ADMIN — Medication 1 TABLET(S): at 11:32

## 2021-12-30 RX ADMIN — PIPERACILLIN AND TAZOBACTAM 25 GRAM(S): 4; .5 INJECTION, POWDER, LYOPHILIZED, FOR SOLUTION INTRAVENOUS at 17:25

## 2021-12-30 RX ADMIN — REMDESIVIR 500 MILLIGRAM(S): 5 INJECTION INTRAVENOUS at 17:25

## 2021-12-30 RX ADMIN — Medication 650 MILLIGRAM(S): at 06:29

## 2021-12-30 NOTE — PROGRESS NOTE ADULT - ASSESSMENT
Chief Complaint: shortness of breath.    · Chief Complaint: The patient is a 89y Male complaining of shortness of breath.  · HPI Objective Statement: 89yo M w/ PMHx of Parkinson's Disease (on no medications), CAD (s/p stent), Bladder cancer (s/p tumor resection) sent from [facility] for pna, lethargy, fevers.  pt currently on ABx cefepime, doxy, flagyl for sacral ulcer and colitis, has xr showing bl pna. sent to ed  pt does not provide history      PAST MEDICAL/SURGICAL/FAMILY/SOCIAL HISTORY:    Past Medical, Past Surgical, and Family History:  PAST MEDICAL HISTORY:  Bladder cancer     Parkinsons     Shoulder fracture     Stented coronary artery over 15 years ago as per patient.     PAST SURGICAL HISTORY:  S/P cardiac catheterization.       patient with infiltrate on cxr and cough and fever   and poor po intake    do iv abx ,   ID eval    hydrate   dvt and gi prophylax  prognosis is poor     12.30.21   weak    covid + , ID eval , iv abx ,   d/w wife    agrees to palliative care and dnr and dni

## 2021-12-30 NOTE — PHYSICAL THERAPY INITIAL EVALUATION ADULT - ADDITIONAL COMMENTS
Patient is a poor historian. From H&P, patient presents from [facility]. Patient states that he was able to walk PTA.

## 2021-12-30 NOTE — SWALLOW BEDSIDE ASSESSMENT ADULT - ADDITIONAL RECOMMENDATIONS
This department will continue to follow Patient for PO candidacy during this admission, as schedule permits

## 2021-12-30 NOTE — CONSULT NOTE ADULT - ASSESSMENT
89 yo M w/ PMHx of Parkinson's Disease (on no medications), CAD (s/p stent), Bladder cancer    covid  ckd  htn  OP  OA  eval for Dysphagia  hypoxemia  ? PNA     SLP eval  Parkinson's hx - risk for aspiration  on Zosyn - role for ABX to be determined - ID eval noted  Covid - hypoxemia - remdesivir and decadron  monitor VS and HD and Sat  serial D dimer  DVT p  assist with needs - ADL  isolation precs  assist with needs - ADL  GOC - DNR  prognosis guarded  old records reviewed

## 2021-12-30 NOTE — PHYSICAL THERAPY INITIAL EVALUATION ADULT - PERTINENT HX OF CURRENT PROBLEM, REHAB EVAL
89yo M w/ PMHx of Parkinson's Disease (on no medications), CAD (s/p stent), Bladder cancer (s/p tumor resection) sent from [facility] for pna, lethargy, fevers.  pt currently on ABx cefepime, doxy, flagyl for sacral ulcer and colitis, has xr showing bl pna. sent to ed

## 2021-12-30 NOTE — CONSULT NOTE ADULT - SUBJECTIVE AND OBJECTIVE BOX
[facility], Division of Infectious Diseases  JASVIR Pederson S. Shah, Y. Patel, G. Richi  [phone]    HAYDER MEYER  89y, Male  [id number]    HPI--  HPI:  Chief Complaint: shortness of breath.    · Chief Complaint: The patient is a 89y Male complaining of shortness of breath.  · HPI Objective Statement: 89yo M w/ PMHx of Parkinson's Disease (on no medications), CAD (s/p stent), Bladder cancer (s/p tumor resection) sent from [facility] for pna, lethargy, fevers.  pt currently on ABx cefepime, doxy, flagyl for sacral ulcer and colitis, has xr showing bl pna. sent to ed  pt does not provide history    Pt seen and examined at bedside in the ED  Reporting spinal pain and some SOB  Unable to obtain additional hx    Vaccination status unknown       Active Medications--  acetaminophen     Tablet .. 650 milliGRAM(s) Oral every 6 hours PRN  aspirin  chewable 81 milliGRAM(s) Oral daily  lactobacillus acidophilus 1 Tablet(s) Oral two times a day  multivitamin 1 Tablet(s) Oral daily  piperacillin/tazobactam IVPB.. 3.375 Gram(s) IV Intermittent every 12 hours  tamsulosin 0.4 milliGRAM(s) Oral at bedtime    Antimicrobials:   piperacillin/tazobactam IVPB.. 3.375 Gram(s) IV Intermittent every 12 hours    Immunologic:     ROS:  CONSTITUTIONAL: No fevers or chills. No weakness or headache. No weight changes.  EYES/ENT: No visual or hearing changes. No sore throat or throat pain .  NECK: No pain or stiffness  RESPIRATORY: No cough, wheezing, or hemoptysis. No shortness of breath  CARDIOVASCULAR: No chest pain or palpitations  GASTROINTESTINAL: No abdominal pain. No nausea or vomiting. No diarrhea or constipation.  GENITOURINARY: No dysuria, frequency or hematuria  NEUROLOGICAL: No numbness or weakness  SKIN: No itching or rashes  PSYCHIATRIC: Pleasant. Appropriate affect    Allergies: clindamycin (Unknown)  Levaquin (Unknown)    PMH -- Shoulder fracture    Bladder cancer    Stented coronary artery    Parkinsons      PSH -- S/P cardiac catheterization      FH -- FH: HTN (hypertension)      Social History --  EtOH: denies   Tobacco: denies   Drug Use: denies     Travel/Environmental/Occupational History:    Physical Exam--  Vital Signs Last 24 Hrs  T(F): 98.1 (30 Dec 2021 09:30), Max: 101.2 (29 Dec 2021 16:21)  HR: 84 (30 Dec 2021 09:30) (74 - 99)  BP: 128/60 (30 Dec 2021 09:30) (124/72 - 174/69)  RR: 16 (30 Dec 2021 09:30) (16 - 18)  SpO2: 99% (30 Dec 2021 09:30) (93% - 99%)  General: nontoxic-appearing, no acute distress  HEENT: NC/AT, EOMI, anicteric, conjunctiva pink and moist, oropharynx clear, dentition fair  Neck: Not rigid. No sense of mass. No LAD  Lungs: decreased b/l breath sounds on NC  Heart: Regular rate and rhythm. No murmur, rub or gallop.  Abdomen: Soft. Nondistended. Nontender. Bowel sounds present. No organomegaly.  Back: No spinal tenderness. No costovertebral angle tenderness.  Extremities: No cyanosis or clubbing. No edema.   Skin: Warm. Dry. Good turgor. No rash. No vasculitic stigmata.      Laboratory & Imaging Data--  CBC:                       8.8    2.55  )-----------( 126      ( 30 Dec 2021 08:57 )             28.1     CMP: 12-30    151<H>  |  121<H>  |  36<H>  ----------------------------<  103<H>  3.3<L>   |  25  |  2.30<H>    Ca    8.1<L>      30 Dec 2021 08:57    TPro  7.3  /  Alb  2.7<L>  /  TBili  0.3  /  DBili  x   /  AST  49<H>  /  ALT  25  /  AlkPhos  43  1229    LIVER FUNCTIONS - ( 29 Dec 2021 16:38 )  Alb: 2.7 g/dL / Pro: 7.3 g/dL / ALK PHOS: 43 U/L / ALT: 25 U/L / AST: 49 U/L / GGT: x           Urinalysis Basic - ( 29 Dec 2021 16:38 )    Color: Yellow / Appearance: Clear / S.015 / pH: x  Gluc: x / Ketone: Negative  / Bili: Negative / Urobili: Negative   Blood: x / Protein: 100 / Nitrite: Negative   Leuk Esterase: Negative / RBC: 0-2 /HPF / WBC 0-2   Sq Epi: x / Non Sq Epi: Moderate / Bacteria: Few        Microbiology: reviewed      Radiology: reviewed  < from: Xray Chest 1 View- PORTABLE-Urgent (21 @ 16:33) >    ACC: [id number] EXAM:  XR CHEST PORTABLE URGENT 1V                          PROCEDURE DATE:  2021          INTERPRETATION:  Portable chest radiograph    CLINICAL INFORMATION: Sepsis    TECHNIQUE:  Portable  AP view of the chest.    COMPARISON: 2021 available for review.    FINDINGS:    Subtle LEFT retrocardiac medial basilar airspace consolidations. LEFT   upper zone and RIGHT lung parenchyma clear..   No pneumothorax.    The heart and mediastinum size and configuration are within normal limits.    Visualized osseous structures are intact.    IMPRESSION:   Subtle LEFT retrocardiac medial basilar airspace   consolidations..    --- End of Report ---            SHYAM MOTA MD; Attending Radiologist  This document has been electronically signed. Dec 30 2021  8:26AM    < end of copied text >     [facility], Division of Infectious Diseases  JASVIR Pederson S. Shah, Y. Patel, G. Richi  [phone]    HAYDER MEYER  89y, Male  [id number]    HPI--  HPI:  Chief Complaint: shortness of breath.    · Chief Complaint: The patient is a 89y Male complaining of shortness of breath.  · HPI Objective Statement: 89yo M w/ PMHx of Parkinson's Disease (on no medications), CAD (s/p stent), Bladder cancer (s/p tumor resection) sent from [facility] for pna, lethargy, fevers.  pt currently on ABx cefepime, doxy, flagyl for sacral ulcer and colitis, has xr showing bl pna. sent to ed  pt does not provide history    Pt seen and examined at bedside in the ED  Reporting spinal pain and some SOB  Unable to obtain additional hx    Vaccination status unknown       Active Medications--  acetaminophen     Tablet .. 650 milliGRAM(s) Oral every 6 hours PRN  aspirin  chewable 81 milliGRAM(s) Oral daily  lactobacillus acidophilus 1 Tablet(s) Oral two times a day  multivitamin 1 Tablet(s) Oral daily  piperacillin/tazobactam IVPB.. 3.375 Gram(s) IV Intermittent every 12 hours  tamsulosin 0.4 milliGRAM(s) Oral at bedtime    Antimicrobials:   piperacillin/tazobactam IVPB.. 3.375 Gram(s) IV Intermittent every 12 hours    Immunologic:     ROS:  CONSTITUTIONAL: No fevers or chills. No weakness or headache. No weight changes.  EYES/ENT: No visual or hearing changes. No sore throat or throat pain .  NECK: No pain or stiffness  RESPIRATORY: No cough, wheezing, or hemoptysis. No shortness of breath  CARDIOVASCULAR: No chest pain or palpitations  GASTROINTESTINAL: No abdominal pain. No nausea or vomiting. No diarrhea or constipation.  GENITOURINARY: No dysuria, frequency or hematuria  NEUROLOGICAL: No numbness or weakness  SKIN: No itching or rashes  PSYCHIATRIC: Pleasant. Appropriate affect    Allergies: clindamycin (Unknown)  Levaquin (Unknown)    PMH -- Shoulder fracture    Bladder cancer    Stented coronary artery    Parkinsons      PSH -- S/P cardiac catheterization      FH -- FH: HTN (hypertension)      Social History --  EtOH: denies   Tobacco: denies   Drug Use: denies     Travel/Environmental/Occupational History:    Physical Exam--  Vital Signs Last 24 Hrs  T(F): 98.1 (30 Dec 2021 09:30), Max: 101.2 (29 Dec 2021 16:21)  HR: 84 (30 Dec 2021 09:30) (74 - 99)  BP: 128/60 (30 Dec 2021 09:30) (124/72 - 174/69)  RR: 16 (30 Dec 2021 09:30) (16 - 18)  SpO2: 99% (30 Dec 2021 09:30) (93% - 99%)  General: elderly M, on NC, NAD  HEENT: NC/AT, EOMI  Neck: Not rigid. No sense of mass. No LAD  Lungs: decreased b/l breath sounds on NC  Heart: Regular rate and rhythm. No murmur, rub or gallop.  Abdomen: Soft. Nondistended. Nontender. Bowel sounds present. No organomegaly.  Back: No spinal tenderness. No costovertebral angle tenderness.  Extremities: No cyanosis or clubbing. No edema.   Skin: Warm. Dry. Good turgor. No rash. No vasculitic stigmata.      Laboratory & Imaging Data--  CBC:                       8.8    2.55  )-----------( 126      ( 30 Dec 2021 08:57 )             28.1     CMP: 12-30    151<H>  |  121<H>  |  36<H>  ----------------------------<  103<H>  3.3<L>   |  25  |  2.30<H>    Ca    8.1<L>      30 Dec 2021 08:57    TPro  7.3  /  Alb  2.7<L>  /  TBili  0.3  /  DBili  x   /  AST  49<H>  /  ALT  25  /  AlkPhos  43  12-29    LIVER FUNCTIONS - ( 29 Dec 2021 16:38 )  Alb: 2.7 g/dL / Pro: 7.3 g/dL / ALK PHOS: 43 U/L / ALT: 25 U/L / AST: 49 U/L / GGT: x           Urinalysis Basic - ( 29 Dec 2021 16:38 )    Color: Yellow / Appearance: Clear / S.015 / pH: x  Gluc: x / Ketone: Negative  / Bili: Negative / Urobili: Negative   Blood: x / Protein: 100 / Nitrite: Negative   Leuk Esterase: Negative / RBC: 0-2 /HPF / WBC 0-2   Sq Epi: x / Non Sq Epi: Moderate / Bacteria: Few        Microbiology: reviewed      Radiology: reviewed  < from: Xray Chest 1 View- PORTABLE-Urgent (21 @ 16:33) >    ACC: [id number] EXAM:  XR CHEST PORTABLE URGENT 1V                          PROCEDURE DATE:  2021          INTERPRETATION:  Portable chest radiograph    CLINICAL INFORMATION: Sepsis    TECHNIQUE:  Portable  AP view of the chest.    COMPARISON: 2021 available for review.    FINDINGS:    Subtle LEFT retrocardiac medial basilar airspace consolidations. LEFT   upper zone and RIGHT lung parenchyma clear..   No pneumothorax.    The heart and mediastinum size and configuration are within normal limits.    Visualized osseous structures are intact.    IMPRESSION:   Subtle LEFT retrocardiac medial basilar airspace   consolidations..    --- End of Report ---            SHYAM MOTA MD; Attending Radiologist  This document has been electronically signed. Dec 30 2021  8:26AM    < end of copied text >

## 2021-12-30 NOTE — PHYSICAL THERAPY INITIAL EVALUATION ADULT - TRANSFER TRAINING, PT EVAL
GOAL: Patient will transfer between sitting/standing [place] with use of rolling walker within 2 weeks.

## 2021-12-30 NOTE — PHARMACOTHERAPY INTERVENTION NOTE - COMMENTS
Medication reconciliation is complete. Medication list updated in Outpatient Medication Record (OMR).

## 2021-12-30 NOTE — PROGRESS NOTE ADULT - SUBJECTIVE AND OBJECTIVE BOX
PCP  Subjective:   in bed , awake , poor po intake ,      Objective:   Vital Signs Last 24 Hrs  T(C): 36.7 (21 @ 09:30), Max: 38.4 (21 @ 16:21)  T(F): 98.1 (21 @ 09:30), Max: 101.2 (21 @ 16:21)  HR: 84 (21 @ 09:30) (74 - 99)  BP: 128/60 (21 @ 09:30) (124/72 - 174/69)  BP(mean): --  RR: 16 (21 @ 09:30) (16 - 18)  SpO2: 99% (21 @ 09:30) (93% - 99%)  Daily Height in cm: 167.64 (29 Dec 2021 15:37)    Daily     GENERAL:  wdwn male ,weak ., awake ,  EYES: eomi  NECK: supple  CHEST/LUNG: rales , bilateral   HEART: s1 s2 regular  ABDOMEN:  soft nt + bs  EXTREMITIES:  no edema  SKIN:  warm , pale , weak   CNS:  awake , alert ,   non focal,  weak ,         Allergies: Allergies    clindamycin (Unknown)  Levaquin (Unknown)    Intolerances        Home Medications:  acetaminophen 325 mg oral tablet: 2 tab(s) orally every 6 hours, As needed, for pain (2021 12:58)  aspirin 81 mg oral tablet, chewable: 1 tab(s) orally once a day (2021 12:58)  lidocaine 4% topical film: Apply topically to affected area once a day to lower back (2021 12:58)  saccharomyces boulardii lyo 250 mg oral capsule: 1 cap(s) orally 2 times a day for 1 week (2021 12:58)  tamsulosin 0.4 mg oral capsule: 1 cap(s) orally once a day (at bedtime) (2021 12:58)    Medications:   acetaminophen     Tablet .. 650 milliGRAM(s) Oral every 6 hours PRN  aspirin  chewable 81 milliGRAM(s) Oral daily  lactobacillus acidophilus 1 Tablet(s) Oral two times a day  multivitamin 1 Tablet(s) Oral daily  piperacillin/tazobactam IVPB.. 3.375 Gram(s) IV Intermittent every 12 hours  tamsulosin 0.4 milliGRAM(s) Oral at bedtime      LABS:                        8.8    2.55  )-----------( 126      ( 30 Dec 2021 08:57 )             28.1     1230    151<H>  |  121<H>  |  36<H>  ----------------------------<  103<H>  3.3<L>   |  25  |  2.30<H>    Ca    8.1<L>      30 Dec 2021 08:57    TPro  7.3  /  Alb  2.7<L>  /  TBili  0.3  /  DBili  x   /  AST  49<H>  /  ALT  25  /  AlkPhos  43      PT/INR - ( 29 Dec 2021 16:38 )   PT: 12.0 sec;   INR: 1.03 ratio         PTT - ( 29 Dec 2021 16:38 )  PTT:32.1 sec   @ 16:38  INR 1.03    Urinalysis Basic - ( 29 Dec 2021 16:38 )    Color: Yellow / Appearance: Clear / S.015 / pH: x  Gluc: x / Ketone: Negative  / Bili: Negative / Urobili: Negative   Blood: x / Protein: 100 / Nitrite: Negative   Leuk Esterase: Negative / RBC: 0-2 /HPF / WBC 0-2   Sq Epi: x / Non Sq Epi: Moderate / Bacteria: Few        CAPILLARY BLOOD GLUCOSE              RECENT CULTURES:

## 2021-12-30 NOTE — PATIENT PROFILE ADULT - FALL HARM RISK - HARM RISK INTERVENTIONS

## 2021-12-30 NOTE — CONSULT NOTE ADULT - ASSESSMENT
89yo M w/ PMHx of Parkinson's Disease (on no medications), CAD (s/p stent), Bladder cancer (s/p tumor resection) sent from [facility] for pna, lethargy, fevers.  pt currently on ABx cefepime, doxy, flagyl for sacral ulcer and colitis, has xr showing bl pna. sent to ed  pt does not provide history    COVID-19 PNA +/- superimposed bacterial PNA  AHRF on O2  Fevers  COVID-19-high risk for decompensation EARLY INFLAMMATORY PHASE (7-14 days post symptom onset)   Patient is not a candidate for mAB at this time given that he was hypoxemic on admission and currently on O2 therapy  -would start remdesivir x5 days  -would startsteroids/AC on this patient  -infectious w/u pending  -imaging reviewed CXR basilar consolidations  -trend biomarkers  -trend temps/WBC  -maintain aspiration precautions  -isolation precautions per infection control policies  -Would c/w zosyn for possible superimposed bacterial PNA---duration pending clinical improvement    Colitis/sacral ulcer  -on zosyn as above, no need to c/w abx from NH    Infectious Diseases will continue to follow. Please call with any questions.   Hina Triplett M.D.  [facility], Division of Infectious Diseases [phone]   89yo M w/ PMHx of Parkinson's Disease (on no medications), CAD (s/p stent), Bladder cancer (s/p tumor resection) sent from [facility] for pna, lethargy, fevers.  pt currently on ABx cefepime, doxy, flagyl for sacral ulcer and colitis, has xr showing bl pna. sent to ed  pt does not provide history    COVID-19 PNA +/- superimposed bacterial PNA  AHRF on O2  Fevers  COVID-19-high risk for decompensation EARLY INFLAMMATORY PHASE (7-14 days post symptom onset)   Patient is not a candidate for mAB at this time given that he was hypoxemic on admission and currently on O2 therapy--d/w Dr. Anderson as well.  -would start remdesivir x5 days  -would startsteroids/AC on this patient  -infectious w/u pending  -imaging reviewed CXR basilar consolidations  -trend biomarkers  -trend temps/WBC  -maintain aspiration precautions  -isolation precautions per infection control policies  -Would c/w zosyn for possible superimposed bacterial PNA---duration pending clinical improvement    Colitis/sacral ulcer  -on zosyn as above, no need to c/w abx from NH    Infectious Diseases will continue to follow. Please call with any questions.   Hina Triplett M.D.  [facility], Division of Infectious Diseases [phone]   89yo M w/ PMHx of Parkinson's Disease (on no medications), CAD (s/p stent), Bladder cancer (s/p tumor resection) sent from [facility] for pna, lethargy, fevers.  pt currently on ABx cefepime, doxy, flagyl for sacral ulcer and colitis, has xr showing bl pna. sent to ed  pt does not provide history    COVID-19 PNA +/- superimposed bacterial PNA  AHRF on O2  Fevers  COVID-19-high risk for decompensation EARLY INFLAMMATORY PHASE (7-14 days post symptom onset)   Patient is not a candidate for mAB at this time given that he was hypoxemic on admission and currently on O2 therapy--d/w Dr. Anderson as well.  -would start remdesivir x5 days until 1/3/2021  -would start steroids/AC on this patient  -infectious w/u pending--will f/u  -imaging reviewed CXR basilar consolidations  -trend biomarkers  -trend temps/WBC  -maintain aspiration precautions  -isolation precautions per infection control policies  -Would c/w zosyn for possible superimposed bacterial PNA---duration pending clinical improvement    Colitis/sacral ulcer  -on zosyn as above, no need to c/w abx from NH    Infectious Diseases will continue to follow. Please call with any questions.   Hina Tripltet M.D.  [facility], Division of Infectious Diseases [phone]   89yo M w/ PMHx of Parkinson's Disease (on no medications), CAD (s/p stent), Bladder cancer (s/p tumor resection) sent from [facility] for pna, lethargy, fevers.  pt currently on ABx cefepime, doxy, flagyl for sacral ulcer and colitis, has xr showing bl pna. sent to ed  pt does not provide history    COVID-19 PNA +/- superimposed bacterial PNA  AHRF on O2  Fevers  COVID-19-high risk for decompensation EARLY INFLAMMATORY PHASE (7-14 days post symptom onset)   Patient is not a candidate for mAB at this time given that he was hypoxemic on admission and currently on O2 therapy--d/w Dr. Anderson as well.  -would start remdesivir x5 days until 1/3/2022  -would start steroids/AC on this patient  -infectious w/u pending--will f/u  -imaging reviewed CXR basilar consolidations  -trend biomarkers  -trend temps/WBC  -maintain aspiration precautions  -isolation precautions per infection control policies  -Would c/w zosyn for possible superimposed bacterial PNA---duration pending clinical improvement    Colitis/sacral ulcer  -on zosyn as above, no need to c/w abx from NH    Infectious Diseases will continue to follow. Please call with any questions.   Hina Triplett M.D.  [facility], Division of Infectious Diseases [phone]

## 2021-12-30 NOTE — CONSULT NOTE ADULT - SUBJECTIVE AND OBJECTIVE BOX
Date/Time Patient Seen:  		  Referring MD:   Data Reviewed	       Patient is a 89y old  Male who presents with a chief complaint of fever    weakness (30 Dec 2021 10:38)      Subjective/HPI    vs noted  labs reviewed  H and P reviewed  ER provider note reviewed  poor historian    on isolation for covid    · Chief Complaint: The patient is a 89y Male complaining of shortness of breath.  · HPI Objective Statement: 89yo M w/ PMHx of Parkinson's Disease (on no medications), CAD (s/p stent), Bladder cancer (s/p tumor resection) sent from [facility] for pna, lethargy, fevers.  pt currently on ABx cefepime, doxy, flagyl for sacral ulcer and colitis, has xr showing bl pna. sent to ed  pt does not provide history    PAST SURGICAL HISTORY:  S/P cardiac catheterization.       patient with infiltrate on cxr and cough and fever   and poor po intake    do iv abx ,   ID eval    hydrate   dvt and gi prophylax  prognosis is poor        Review of Systems:  Review of Systems: cough  weakness , fever  Other Review of Systems: All other review of systems negative, except as noted in HPI      Allergies and Intolerances:        Allergies:  	Levaquin: Drug, Unknown  	clindamycin: Drug, Unknown    FAMILY HISTORY:  FH: HTN (hypertension).     Social History:  Social History (marital status, living situation, occupation, tobacco use, alcohol and drug use, and sexual history):  , plus kids , non smoker     Tobacco Screening:  · Core Measure Site	Yes  · Has the patient used tobacco in the past 30 days?	No    Risk Assessment:    Present on Admission:  Deep Venous Thrombosis	no  Pulmonary Embolus	no     Heart Failure:  Does this patient have a history of or has been diagnosed with heart failure? no.       PAST MEDICAL & SURGICAL HISTORY:  Shoulder fracture    Bladder cancer    Stented coronary artery  over 15 years ago as per patient    Parkinsons    S/P cardiac catheterization          Medication list         MEDICATIONS  (STANDING):  aspirin  chewable 81 milliGRAM(s) Oral daily  enoxaparin Injectable 30 milliGRAM(s) SubCutaneous daily  lactobacillus acidophilus 1 Tablet(s) Oral two times a day  multivitamin 1 Tablet(s) Oral daily  piperacillin/tazobactam IVPB.. 3.375 Gram(s) IV Intermittent every 12 hours  remdesivir  IVPB   IV Intermittent   remdesivir  IVPB 200 milliGRAM(s) IV Intermittent every 24 hours  tamsulosin 0.4 milliGRAM(s) Oral at bedtime    MEDICATIONS  (PRN):  acetaminophen     Tablet .. 650 milliGRAM(s) Oral every 6 hours PRN Temp greater or equal to 38C (100.4F), Mild Pain (1 - 3)         Vitals log        ICU Vital Signs Last 24 Hrs  T(C): 36.7 (30 Dec 2021 09:30), Max: 38.4 (29 Dec 2021 16:21)  T(F): 98.1 (30 Dec 2021 09:30), Max: 101.2 (29 Dec 2021 16:21)  HR: 84 (30 Dec 2021 09:30) (74 - 99)  BP: 128/60 (30 Dec 2021 09:30) (124/72 - 174/69)  BP(mean): --  ABP: --  ABP(mean): --  RR: 16 (30 Dec 2021 09:30) (16 - 18)  SpO2: 99% (30 Dec 2021 09:30) (93% - 99%)           Input and Output:  I&O's Detail      Lab Data                        8.8    2.55  )-----------( 126      ( 30 Dec 2021 08:57 )             28.1     12-30    151<H>  |  121<H>  |  36<H>  ----------------------------<  103<H>  3.3<L>   |  25  |  2.30<H>    Ca    8.1<L>      30 Dec 2021 08:57    TPro  7.3  /  Alb  2.7<L>  /  TBili  0.3  /  DBili  x   /  AST  49<H>  /  ALT  25  /  AlkPhos  43  12-29            Review of Systems	  on o2 support      Objective     Physical Examination    heart s1s2  lung dec BS  abd soft  head nc  verbal      Pertinent Lab findings & Imaging      May:  NO   Adequate UO     I&O's Detail           Discussed with:     Cultures:	        Radiology      ACC: [id number] EXAM:  XR CHEST PORTABLE URGENT 1V                          PROCEDURE DATE:  12/29/2021          INTERPRETATION:  Portable chest radiograph    CLINICAL INFORMATION: Sepsis    TECHNIQUE:  Portable  AP view of the chest.    COMPARISON: 11/26/2021 available for review.    FINDINGS:    Subtle LEFT retrocardiac medial basilar airspace consolidations. LEFT   upper zone and RIGHT lung parenchyma clear..   No pneumothorax.    The heart and mediastinum size and configuration are within normal limits.    Visualized osseous structures are intact.    IMPRESSION:   Subtle LEFT retrocardiac medial basilar airspace   consolidations..    --- End of Report ---    IMPRESSION:  Technically difficult study  Normal left ventricular internal dimensions and systolic function, estimated LVEF of 60%.  Grossly normal RV size and systolic function.  Calcified trileaflet aortic valve with mild aortic stenosis, trace AI.  Trace physiologic MR and TR.  No significant pericardial effusion.    --- End of Report ---              FRANDY ROSALES MD; Attending Cardiologist        SHYAM MOTA MD; Attending Radiologist  This document has been electronically signed. Dec 30 2021  8:26AM

## 2021-12-31 LAB — CRP SERPL-MCNC: 27 MG/L — HIGH

## 2021-12-31 RX ADMIN — REMDESIVIR 500 MILLIGRAM(S): 5 INJECTION INTRAVENOUS at 17:52

## 2021-12-31 RX ADMIN — PIPERACILLIN AND TAZOBACTAM 25 GRAM(S): 4; .5 INJECTION, POWDER, LYOPHILIZED, FOR SOLUTION INTRAVENOUS at 05:28

## 2021-12-31 RX ADMIN — ENOXAPARIN SODIUM 30 MILLIGRAM(S): 100 INJECTION SUBCUTANEOUS at 11:38

## 2021-12-31 RX ADMIN — Medication 81 MILLIGRAM(S): at 12:06

## 2021-12-31 RX ADMIN — Medication 6 MILLIGRAM(S): at 05:27

## 2021-12-31 RX ADMIN — Medication 1 TABLET(S): at 17:52

## 2021-12-31 RX ADMIN — Medication 1 TABLET(S): at 12:06

## 2021-12-31 RX ADMIN — PIPERACILLIN AND TAZOBACTAM 25 GRAM(S): 4; .5 INJECTION, POWDER, LYOPHILIZED, FOR SOLUTION INTRAVENOUS at 18:40

## 2021-12-31 RX ADMIN — Medication 1 TABLET(S): at 05:27

## 2021-12-31 RX ADMIN — TAMSULOSIN HYDROCHLORIDE 0.4 MILLIGRAM(S): 0.4 CAPSULE ORAL at 21:17

## 2021-12-31 NOTE — SWALLOW BEDSIDE ASSESSMENT ADULT - SPECIFY REASON(S)
to assess swallow function
to reassess swallow function. pt is familiar to this service from a clinical assessment of swallow function completed on 12/30/2021 (please see report for details)

## 2021-12-31 NOTE — DIETITIAN INITIAL EVALUATION ADULT. - ORAL INTAKE PTA/DIET HISTORY
patient from [facility] seen sleeping in bed RN with patient   on ensure plus at facility per transfer papers (noted soft and bite sized diet last admit November this year)  unable to reach family at this time NKFA per admit records  patient admitted with low PO intake per admit records  poor prognosis per MD noted MOLST no tube feeding decision

## 2021-12-31 NOTE — SWALLOW BEDSIDE ASSESSMENT ADULT - SWALLOW EVAL: RECOMMENDED DIET
oral nutrition/hydration/medication is contraindicated. Consider short-term means of non-oral nutrition/hydration/medication and GOC discussion with family regarding nutritional plan of care.
puree diet with moderately thick liquids, all PO to be consumed via teaspoon ONLY.

## 2021-12-31 NOTE — DIETITIAN INITIAL EVALUATION ADULT. - ETIOLOGY
related to inadequate oral intake with Parkinson's/ as well as viral syndrome related to wound healing

## 2021-12-31 NOTE — SWALLOW BEDSIDE ASSESSMENT ADULT - ASR SWALLOW RECOMMEND DIAG
not warranted d/t severity of pharyngeal phase; will continue to monitor at bedside
to objectively assess swallow function given history of PD as well as chest imaging./VFSS/MBS

## 2021-12-31 NOTE — SWALLOW BEDSIDE ASSESSMENT ADULT - PHARYNGEAL PHASE
Delayed pharyngeal swallow/Decreased laryngeal elevation Delayed pharyngeal swallow/Decreased laryngeal elevation/Wet vocal quality post oral intake/Cough post oral intake/Throat clear post oral intake

## 2021-12-31 NOTE — PROGRESS NOTE ADULT - ASSESSMENT
89 yo M w/ PMHx of Parkinson's Disease (on no medications), CAD (s/p stent), Bladder cancer    covid  ckd  htn  OP  OA  eval for Dysphagia  hypoxemia  ? PNA       remains febrile - vs noted     SLP eval noted  Parkinson's hx - risk for aspiration  on Zosyn - role for ABX to be determined - ID eval noted  Covid - hypoxemia - remdesivir and decadron  monitor VS and HD and Sat  serial D dimer  DVT p  assist with needs - ADL  isolation precs  assist with needs - ADL  GOC - DNR  prognosis guarded  old records reviewed

## 2021-12-31 NOTE — DIETITIAN INITIAL EVALUATION ADULT. - ADD RECOMMEND
1. recommend Vit C 500mg BID 2. follow for further speech recommendations 3. if patient to remain on full fluids per MD discretion recommend ensure enlive BID

## 2021-12-31 NOTE — PROGRESS NOTE ADULT - ASSESSMENT
89yo M w/ PMHx of Parkinson's Disease (on no medications), CAD (s/p stent), Bladder cancer (s/p tumor resection) sent from [facility] for pna, lethargy, fevers.  pt currently on ABx cefepime, doxy, flagyl for sacral ulcer and colitis, has xr showing bl pna. sent to ed  pt does not provide history    COVID-19 PNA +/- superimposed bacterial PNA  AHRF on O2  Fevers  COVID-19-high risk for decompensation EARLY INFLAMMATORY PHASE (7-14 days post symptom onset)   Patient is not a candidate for mAB at this time given that he was hypoxemic on admission and currently on O2 therapy--d/w Dr. Anderson as well.  -would start remdesivir x5 days until 1/3/2022  -would start steroids/AC on this patient  -infectious w/u pending--will f/u  -imaging reviewed CXR basilar consolidations  -trend biomarkers  -trend temps/WBC  -maintain aspiration precautions  -isolation precautions per infection control policies  -Would c/w zosyn for possible superimposed bacterial PNA---duration pending clinical improvement    Colitis/sacral ulcer  -on zosyn as above, no need to c/w abx from NH    Infectious Diseases will continue to follow. Please call with any questions.   Hina Triplett M.D.  [facility], Division of Infectious Diseases [phone]   89yo M w/ PMHx of Parkinson's Disease (on no medications), CAD (s/p stent), Bladder cancer (s/p tumor resection) sent from [facility] for pna, lethargy, fevers.  pt currently on ABx cefepime, doxy, flagyl for sacral ulcer and colitis, has xr showing bl pna. sent to ed  pt does not provide history    COVID-19 PNA +/- superimposed bacterial PNA  AHRF on O2  Fevers  Leukopenia  COVID-19-high risk for decompensation EARLY INFLAMMATORY PHASE (7-14 days post symptom onset)   -c/w remdesivir x5 day course  -c/w steroids x10 day course  -infectious w/u negative to date  -imaging reviewed--CXR basilar consolidations  -trend biomarkers  -trend temps/WBC  --fevers noted, likely course of disease, will c/w monitoring  -maintain aspiration precautions  -isolation precautions per infection control policies  -Would c/w zosyn for possible superimposed bacterial PNA---duration pending clinical improvement    Colitis/sacral ulcer  -on zosyn as above, no need to c/w abx from NH    Infectious Diseases will continue to follow. Please call with any questions.   Hina Triplett M.D.  [facility], Division of Infectious Diseases [phone]

## 2021-12-31 NOTE — PROGRESS NOTE ADULT - ASSESSMENT
Chief Complaint: shortness of breath.    · Chief Complaint: The patient is a 89y Male complaining of shortness of breath.  · HPI Objective Statement: 89yo M w/ PMHx of Parkinson's Disease (on no medications), CAD (s/p stent), Bladder cancer (s/p tumor resection) sent from [facility] for pna, lethargy, fevers.  pt currently on ABx cefepime, doxy, flagyl for sacral ulcer and colitis, has xr showing bl pna. sent to ed  pt does not provide history      PAST MEDICAL/SURGICAL/FAMILY/SOCIAL HISTORY:    Past Medical, Past Surgical, and Family History:  PAST MEDICAL HISTORY:  Bladder cancer     Parkinsons     Shoulder fracture     Stented coronary artery over 15 years ago as per patient.     PAST SURGICAL HISTORY:  S/P cardiac catheterization.       patient with infiltrate on cxr and cough and fever   and poor po intake    do iv abx ,   ID eval    hydrate   dvt and gi prophylax  prognosis is poor     12.30.21   weak    covid + , ID eval , iv abx ,   d/w wife    agrees to palliative care and dnr and dni    on 12.31 21 as per ID:    COVID-19 PNA +/- superimposed bacterial PNA  AHRF on O2  Fevers  COVID-19-high risk for decompensation EARLY INFLAMMATORY PHASE (7-14 days post symptom onset)   Patient is not a candidate for mAB at this time given that he was hypoxemic on admission and currently on O2 therapy--d/w Dr. Anderson as well.  -would start remdesivir x5 days until 1/3/2022  -would start steroids/AC on this patient  -infectious w/u pending--will f/u  -imaging reviewed CXR basilar consolidations  -trend biomarkers  -trend temps/WBC  -maintain aspiration precautions  -isolation precautions per infection control policies  -Would c/w zosyn for possible superimposed bacterial PNA---duration pending clinical improvement    Colitis/sacral ulcer  -on zosyn as above, no need to c/w abx from NH    Infectious Diseases will continue to follow. Please call with any questions.

## 2021-12-31 NOTE — PROGRESS NOTE ADULT - SUBJECTIVE AND OBJECTIVE BOX
Date/Time Patient Seen:  		  Referring MD:   Data Reviewed	       Patient is a 89y old  Male who presents with a chief complaint of fever    weakness (31 Dec 2021 09:46)      Subjective/HPI     PAST MEDICAL & SURGICAL HISTORY:  Shoulder fracture    Bladder cancer    Stented coronary artery  over 15 years ago as per patient    Parkinsons    S/P cardiac catheterization          Medication list         MEDICATIONS  (STANDING):  aspirin  chewable 81 milliGRAM(s) Oral daily  dexAMETHasone     Tablet 6 milliGRAM(s) Oral daily  enoxaparin Injectable 30 milliGRAM(s) SubCutaneous daily  lactobacillus acidophilus 1 Tablet(s) Oral two times a day  multivitamin 1 Tablet(s) Oral daily  piperacillin/tazobactam IVPB.. 3.375 Gram(s) IV Intermittent every 12 hours  remdesivir  IVPB 100 milliGRAM(s) IV Intermittent every 24 hours  remdesivir  IVPB   IV Intermittent   tamsulosin 0.4 milliGRAM(s) Oral at bedtime    MEDICATIONS  (PRN):  acetaminophen     Tablet .. 650 milliGRAM(s) Oral every 6 hours PRN Temp greater or equal to 38C (100.4F), Mild Pain (1 - 3)  guaiFENesin Oral Liquid (Sugar-Free) 200 milliGRAM(s) Oral every 6 hours PRN Cough         Vitals log        ICU Vital Signs Last 24 Hrs  T(C): 36.3 (31 Dec 2021 05:19), Max: 38.6 (30 Dec 2021 20:41)  T(F): 97.3 (31 Dec 2021 05:19), Max: 101.5 (30 Dec 2021 20:41)  HR: 87 (31 Dec 2021 05:19) (87 - 96)  BP: 120/72 (31 Dec 2021 05:19) (109/64 - 138/52)  BP(mean): --  ABP: --  ABP(mean): --  RR: 18 (31 Dec 2021 05:19) (16 - 18)  SpO2: 87% (31 Dec 2021 05:19) (87% - 97%)           Input and Output:  I&O's Detail    30 Dec 2021 07:01  -  31 Dec 2021 07:00  --------------------------------------------------------  IN:    Oral Fluid: 360 mL  Total IN: 360 mL    OUT:  Total OUT: 0 mL    Total NET: 360 mL          Lab Data                        8.8    2.55  )-----------( 126      ( 30 Dec 2021 08:57 )             28.1     12-30    151<H>  |  121<H>  |  36<H>  ----------------------------<  103<H>  3.3<L>   |  25  |  2.30<H>    Ca    8.1<L>      30 Dec 2021 08:57    TPro  7.3  /  Alb  2.7<L>  /  TBili  0.3  /  DBili  x   /  AST  49<H>  /  ALT  25  /  AlkPhos  43  12-29            Review of Systems	      Objective     Physical Examination  heart s1s2  lung dec BS  abd soft        Pertinent Lab findings & Imaging      Lalo:  NO   Adequate UO     I&O's Detail    30 Dec 2021 07:01  -  31 Dec 2021 07:00  --------------------------------------------------------  IN:    Oral Fluid: 360 mL  Total IN: 360 mL    OUT:  Total OUT: 0 mL    Total NET: 360 mL               Discussed with:     Cultures:	        Radiology

## 2021-12-31 NOTE — DIETITIAN INITIAL EVALUATION ADULT. - OTHER INFO
89 year old male Dx PN COVID 19 PMH Parkinson's bladder Cancer  this admit total feed noted 1X % of meal taken  weight history current admit wt 113# same wt per transfer papers November admit 122#

## 2021-12-31 NOTE — SWALLOW BEDSIDE ASSESSMENT ADULT - COMMENTS
Pt was awake and cooperative for a clinical reassessment of swallow function this AM, however, lethargy noted. Per charting, pt is a "89yo M w/ PMHx of Parkinson's Disease (on no medications), CAD (s/p stent), Bladder cancer (s/p tumor resection) sent from [facility] for pna, lethargy, fevers.  pt currently on ABx cefepime, doxy, flagyl for sacral ulcer and colitis, has xr showing bl pna. sent to ed" WBC reduced. CXR 12/29/21 " Subtle LEFT retrocardiac medial basilar airspace consolidations"     Pt familiar to this service from a clinical assessment of swallow function completed 12/30/2021 at which time oral nutrition/hydration was contraindicated. Today's reassessment is being completed to assess for PO candidacy.

## 2021-12-31 NOTE — PROGRESS NOTE ADULT - SUBJECTIVE AND OBJECTIVE BOX
[facility], Division of Infectious Diseases  JASVIR Pederson Y. Patel, S. Shah, G. [facility]  [phone]    Name: HAYDER MEYER  Age: 89y  Gender: Male  MRN: [id number]    Interval History:  Patient seen and examined at bedside this morning  No acute overnight events.   Notes reviewed    Antibiotics:  piperacillin/tazobactam IVPB.. 3.375 Gram(s) IV Intermittent every 12 hours  remdesivir  IVPB 100 milliGRAM(s) IV Intermittent every 24 hours  remdesivir  IVPB   IV Intermittent       Medications:  acetaminophen     Tablet .. 650 milliGRAM(s) Oral every 6 hours PRN  aspirin  chewable 81 milliGRAM(s) Oral daily  dexAMETHasone     Tablet 6 milliGRAM(s) Oral daily  enoxaparin Injectable 30 milliGRAM(s) SubCutaneous daily  guaiFENesin Oral Liquid (Sugar-Free) 200 milliGRAM(s) Oral every 6 hours PRN  lactobacillus acidophilus 1 Tablet(s) Oral two times a day  multivitamin 1 Tablet(s) Oral daily  piperacillin/tazobactam IVPB.. 3.375 Gram(s) IV Intermittent every 12 hours  remdesivir  IVPB 100 milliGRAM(s) IV Intermittent every 24 hours  remdesivir  IVPB   IV Intermittent   tamsulosin 0.4 milliGRAM(s) Oral at bedtime      Review of Systems:  A 10-point review of systems was obtained.     Pertinent positives and negatives--  Constitutional: No fevers. No Chills. No Rigors.   Cardiovascular: No chest pain. No palpitations.  Respiratory: No shortness of breath. No cough.  Gastrointestinal: No nausea or vomiting. No diarrhea or constipation.   Psychiatric: Pleasant. Appropriate affect.    Review of systems otherwise negative except as previously noted.    Allergies: clindamycin (Unknown)  Levaquin (Unknown)    For details regarding the patient's past medical history, social history, family history, and other miscellaneous elements, please refer the initial infectious diseases consultation and/or the admitting history and physical examination for this admission.    Objective:  Vitals:   T(C): 36.3 (21 @ 05:19), Max: 38.6 (21 @ 20:41)  HR: 87 (21 @ 05:19) (87 - 96)  BP: 120/72 (21 @ 05:19) (109/64 - 138/52)  RR: 18 (21 @ 05:19) (16 - 18)  SpO2: 87% (21 @ 05:19) (87% - 97%)    Physical Examination:  General: no acute distress  HEENT: NC/AT, EOMI, anicteric, no oral lesions  Neck: supple, no palpable LAD  Cardio: S1, S2 heard, RRR, no murmurs  Resp: breath sounds heard bilaterally, no rales, wheezes or rhonchi  Abd: soft, NT, ND, + bowel sounds  Neuro: no obvious focal deficits  Ext: no edema or cyanosis  Skin: warm, dry, no visible rash      Laboratory Studies:  CBC:                       8.8    2.55  )-----------( 126      ( 30 Dec 2021 08:57 )             28.1     CMP:     151<H>  |  121<H>  |  36<H>  ----------------------------<  103<H>  3.3<L>   |  25  |  2.30<H>    Ca    8.1<L>      30 Dec 2021 08:57    TPro  7.3  /  Alb  2.7<L>  /  TBili  0.3  /  DBili  x   /  AST  49<H>  /  ALT  25  /  AlkPhos  43  12-29    LIVER FUNCTIONS - ( 29 Dec 2021 16:38 )  Alb: 2.7 g/dL / Pro: 7.3 g/dL / ALK PHOS: 43 U/L / ALT: 25 U/L / AST: 49 U/L / GGT: x           Urinalysis Basic - ( 29 Dec 2021 16:38 )    Color: Yellow / Appearance: Clear / S.015 / pH: x  Gluc: x / Ketone: Negative  / Bili: Negative / Urobili: Negative   Blood: x / Protein: 100 / Nitrite: Negative   Leuk Esterase: Negative / RBC: 0-2 /HPF / WBC 0-2   Sq Epi: x / Non Sq Epi: Moderate / Bacteria: Few        Microbiology: reviewed    Culture - Blood (collected 21 @ 00:43)  Source: .Blood Blood-Peripheral  Preliminary Report (21 @ 01:02):    No growth to date.    Culture - Blood (collected 21 @ 00:43)  Source: .Blood Blood-Peripheral  Preliminary Report (21 @ 01:02):    No growth to date.    Culture - Urine (collected 21 @ 00:38)  Source: Clean Catch Clean Catch (Midstream)  Final Report (21 @ 22:38):    No growth        Radiology: reviewed       [facility], Division of Infectious Diseases  JASVIR Pederson Y. Patel, S. Shah, G. [facility]  [phone]    Name: HAYDER MEYER  Age: 89y  Gender: Male  MRN: [id number]    Interval History:  Patient seen and examined at bedside this morning  Febrile to 101.5F this AM. Continues to remain on NC  Notes reviewed    Antibiotics:  piperacillin/tazobactam IVPB.. 3.375 Gram(s) IV Intermittent every 12 hours  remdesivir  IVPB 100 milliGRAM(s) IV Intermittent every 24 hours  remdesivir  IVPB   IV Intermittent       Medications:  acetaminophen     Tablet .. 650 milliGRAM(s) Oral every 6 hours PRN  aspirin  chewable 81 milliGRAM(s) Oral daily  dexAMETHasone     Tablet 6 milliGRAM(s) Oral daily  enoxaparin Injectable 30 milliGRAM(s) SubCutaneous daily  guaiFENesin Oral Liquid (Sugar-Free) 200 milliGRAM(s) Oral every 6 hours PRN  lactobacillus acidophilus 1 Tablet(s) Oral two times a day  multivitamin 1 Tablet(s) Oral daily  piperacillin/tazobactam IVPB.. 3.375 Gram(s) IV Intermittent every 12 hours  remdesivir  IVPB 100 milliGRAM(s) IV Intermittent every 24 hours  remdesivir  IVPB   IV Intermittent   tamsulosin 0.4 milliGRAM(s) Oral at bedtime      Review of Systems:  unable to obtain    Allergies: clindamycin (Unknown)  Levaquin (Unknown)    For details regarding the patient's past medical history, social history, family history, and other miscellaneous elements, please refer the initial infectious diseases consultation and/or the admitting history and physical examination for this admission.    Objective:  Vitals:   T(C): 36.3 (21 @ 05:19), Max: 38.6 (21 @ 20:41)  HR: 87 (21 @ 05:19) (87 - 96)  BP: 120/72 (21 @ 05:19) (109/64 - 138/52)  RR: 18 (21 @ 05:19) (16 - 18)  SpO2: 87% (21 @ 05:19) (87% - 97%)    Physical Examination:  General: no acute distress  HEENT: NC/AT, EOMI, anicteric, no oral lesions  Neck: supple, no palpable LAD  Cardio: S1, S2 heard, RRR, no murmurs  Resp: decreased b/l breath sounds on NC  Abd: soft, NT, ND, + bowel sounds  Ext: no edema or cyanosis  Skin: warm, dry, no visible rash      Laboratory Studies:  CBC:                       8.8    2.55  )-----------( 126      ( 30 Dec 2021 08:57 )             28.1     CMP:     151<H>  |  121<H>  |  36<H>  ----------------------------<  103<H>  3.3<L>   |  25  |  2.30<H>    Ca    8.1<L>      30 Dec 2021 08:57    TPro  7.3  /  Alb  2.7<L>  /  TBili  0.3  /  DBili  x   /  AST  49<H>  /  ALT  25  /  AlkPhos  43  1229    LIVER FUNCTIONS - ( 29 Dec 2021 16:38 )  Alb: 2.7 g/dL / Pro: 7.3 g/dL / ALK PHOS: 43 U/L / ALT: 25 U/L / AST: 49 U/L / GGT: x           Urinalysis Basic - ( 29 Dec 2021 16:38 )    Color: Yellow / Appearance: Clear / S.015 / pH: x  Gluc: x / Ketone: Negative  / Bili: Negative / Urobili: Negative   Blood: x / Protein: 100 / Nitrite: Negative   Leuk Esterase: Negative / RBC: 0-2 /HPF / WBC 0-2   Sq Epi: x / Non Sq Epi: Moderate / Bacteria: Few        Microbiology: reviewed    Culture - Blood (collected 21 @ 00:43)  Source: .Blood Blood-Peripheral  Preliminary Report (21 @ 01:02):    No growth to date.    Culture - Blood (collected 21 @ 00:43)  Source: .Blood Blood-Peripheral  Preliminary Report (21 @ 01:02):    No growth to date.    Culture - Urine (collected 21 @ 00:38)  Source: Clean Catch Clean Catch (Midstream)  Final Report (21 @ 22:38):    No growth        Radiology: reviewed

## 2021-12-31 NOTE — DIETITIAN INITIAL EVALUATION ADULT. - SIGNS/SYMPTOMS
as evidenced by moderate orbital fat loss&moderate temple muscle & presumed inadequate energy intake as evidenced by pressure injuries as evidenced by moderate orbital fat loss& moderate temple muscle & weight loss 7% 1 month

## 2021-12-31 NOTE — SWALLOW BEDSIDE ASSESSMENT ADULT - SWALLOW EVAL: PROGNOSIS
DIAGNOSIS CONTINUED: cough as well as change in vocal quality to a 'wet' tone response post trials, indicative of penetration/aspiration. reducing bolus size to teaspoon amounts does not result in improved clinical pharyngeal swallow given mildly thick and thin liquid viscosities.
fair; continue to monitor closely

## 2021-12-31 NOTE — DIETITIAN INITIAL EVALUATION ADULT. - PHYSCIAL ASSESSMENT
sacrum un-stageable bilateral heels stage 1 sacrum stage 1  patient seen with orbital and temple moderate loss visually BMI 18.2/underweight

## 2021-12-31 NOTE — PROGRESS NOTE ADULT - SUBJECTIVE AND OBJECTIVE BOX
PCP  Subjective:   in bed , awake ,     Objective:   Vital Signs Last 24 Hrs  T(C): 36.3 (21 @ 05:19), Max: 38.6 (21 @ 20:41)  T(F): 97.3 (21 @ 05:19), Max: 101.5 (21 @ 20:41)  HR: 87 (21 @ 05:19) (84 - 96)  BP: 120/72 (21 @ 05:19) (109/64 - 138/52)  BP(mean): --  RR: 18 (21 @ 05:19) (16 - 18)  SpO2: 87% (21 @ 05:19) (87% - 99%)  Daily     Daily Weight in k (31 Dec 2021 05:19)  GENERAL:  wdwn male ,weak ., awake ,  EYES: eomi  NECK: supple  CHEST/LUNG: rales , bilateral   HEART: s1 s2 regular  ABDOMEN:  soft nt + bs  EXTREMITIES:  no edema  SKIN:  warm , pale , weak   CNS:  awake , alert ,   non focal,  weak ,     Allergies: Allergies    clindamycin (Unknown)  Levaquin (Unknown)    Intolerances        Home Medications:  acetaminophen 325 mg oral tablet: 2 tab(s) orally every 6 hours, As needed, for pain (30 Dec 2021 10:54)  Acidophilus with Pectin oral capsule: 1 cap(s) orally 2 times a day (30 Dec 2021 10:54)  aspirin 81 mg oral tablet, chewable: 1 tab(s) orally once a day (30 Dec 2021 10:54)  cefpodoxime 200 mg oral tablet: 1 tab(s) orally every 12 hours for 7 days    *Start 2021 (30 Dec 2021 10:54)  Dulcolax Laxative 10 mg rectal suppository: 1 suppository(ies) rectal once a day, As Needed if no bowel movement results after M.O.M. (30 Dec 2021 10:54)  ferrous sulfate 325 mg (65 mg elemental iron) oral tablet: 1 tab(s) orally 2 times a day (30 Dec 2021 10:54)  Fleet Enema 19 g-7 g rectal enema: 133 milliliter(s) rectal once a day, As Needed if no bowel movement results for 4-8 hours after Dulcolax sup (30 Dec 2021 10:54)  Icy Hot Extra Strength 5% topical pad: Apply topically once a day to lower back (30 Dec 2021 10:54)  Milk of Magnesia 8% oral suspension: 30 milliliter(s) orally once a day, As Needed if no bowel movement in 6 consecutive shifts (30 Dec 2021 10:54)  mirtazapine 15 mg oral tablet: 1 tab(s) orally once a day (at bedtime) (30 Dec 2021 10:54)  oxyCODONE 5 mg oral tablet: 1 tab(s) orally every 8 hours, As Needed pain (30 Dec 2021 10:54)  tamsulosin 0.4 mg oral capsule: 1 cap(s) orally once a day (at bedtime) (30 Dec 2021 10:54)    Medications:   acetaminophen     Tablet .. 650 milliGRAM(s) Oral every 6 hours PRN  aspirin  chewable 81 milliGRAM(s) Oral daily  dexAMETHasone     Tablet 6 milliGRAM(s) Oral daily  enoxaparin Injectable 30 milliGRAM(s) SubCutaneous daily  guaiFENesin Oral Liquid (Sugar-Free) 200 milliGRAM(s) Oral every 6 hours PRN  lactobacillus acidophilus 1 Tablet(s) Oral two times a day  multivitamin 1 Tablet(s) Oral daily  piperacillin/tazobactam IVPB.. 3.375 Gram(s) IV Intermittent every 12 hours  remdesivir  IVPB 100 milliGRAM(s) IV Intermittent every 24 hours  remdesivir  IVPB   IV Intermittent   tamsulosin 0.4 milliGRAM(s) Oral at bedtime      LABS:                        8.8    2.55  )-----------( 126      ( 30 Dec 2021 08:57 )             28.1     12-30    151<H>  |  121<H>  |  36<H>  ----------------------------<  103<H>  3.3<L>   |  25  |  2.30<H>    Ca    8.1<L>      30 Dec 2021 08:57    TPro  7.3  /  Alb  2.7<L>  /  TBili  0.3  /  DBili  x   /  AST  49<H>  /  ALT  25  /  AlkPhos  43  12-29    PT/INR - ( 29 Dec 2021 16:38 )   PT: 12.0 sec;   INR: 1.03 ratio         PTT - ( 29 Dec 2021 16:38 )  PTT:32.1 sec   @ 16:38  INR 1.03    Urinalysis Basic - ( 29 Dec 2021 16:38 )    Color: Yellow / Appearance: Clear / S.015 / pH: x  Gluc: x / Ketone: Negative  / Bili: Negative / Urobili: Negative   Blood: x / Protein: 100 / Nitrite: Negative   Leuk Esterase: Negative / RBC: 0-2 /HPF / WBC 0-2   Sq Epi: x / Non Sq Epi: Moderate / Bacteria: Few        CAPILLARY BLOOD GLUCOSE              RECENT CULTURES:  Culture Results:   No growth to date. ( @ 00:43)  Culture Results:   No growth to date. ( @ 00:43)  Culture Results:   No growth ( @ 00:38)        Culture - Blood (collected 21 @ 00:43)  Source: .Blood Blood-Peripheral  Preliminary Report (21 @ 01:02):    No growth to date.    Culture - Blood (collected 21 @ 00:43)  Source: .Blood Blood-Peripheral  Preliminary Report (21 @ 01:02):    No growth to date.    Culture - Urine (collected 21 @ 00:38)  Source: Clean Catch Clean Catch (Midstream)  Final Report (21 @ 22:38):    No growth

## 2022-01-01 RX ADMIN — PIPERACILLIN AND TAZOBACTAM 25 GRAM(S): 4; .5 INJECTION, POWDER, LYOPHILIZED, FOR SOLUTION INTRAVENOUS at 17:16

## 2022-01-01 RX ADMIN — Medication 1 TABLET(S): at 11:59

## 2022-01-01 RX ADMIN — Medication 1 TABLET(S): at 05:26

## 2022-01-01 RX ADMIN — Medication 81 MILLIGRAM(S): at 11:59

## 2022-01-01 RX ADMIN — TAMSULOSIN HYDROCHLORIDE 0.4 MILLIGRAM(S): 0.4 CAPSULE ORAL at 21:44

## 2022-01-01 RX ADMIN — Medication 650 MILLIGRAM(S): at 21:45

## 2022-01-01 RX ADMIN — Medication 650 MILLIGRAM(S): at 22:36

## 2022-01-01 RX ADMIN — Medication 6 MILLIGRAM(S): at 05:26

## 2022-01-01 RX ADMIN — ENOXAPARIN SODIUM 30 MILLIGRAM(S): 100 INJECTION SUBCUTANEOUS at 12:00

## 2022-01-01 RX ADMIN — PIPERACILLIN AND TAZOBACTAM 25 GRAM(S): 4; .5 INJECTION, POWDER, LYOPHILIZED, FOR SOLUTION INTRAVENOUS at 05:26

## 2022-01-01 RX ADMIN — Medication 1 TABLET(S): at 17:16

## 2022-01-01 RX ADMIN — REMDESIVIR 500 MILLIGRAM(S): 5 INJECTION INTRAVENOUS at 17:16

## 2022-01-01 NOTE — PROGRESS NOTE ADULT - ASSESSMENT
89 yo M w/ PMHx of Parkinson's Disease (on no medications), CAD (s/p stent), Bladder cancer    covid  ckd  htn  OP  OA  eval for Dysphagia  hypoxemia  ? PNA     on ABX  procalcitonin  crp   VS noted  labs reviewed  on PO Liquid diet    SLP eval noted  Parkinson's hx - risk for aspiration  on Zosyn - role for ABX to be determined - ID eval noted  Covid - hypoxemia - remdesivir and decadron  monitor VS and HD and Sat  serial D dimer  DVT p  assist with needs - ADL  isolation precs  assist with needs - ADL  GOC - DNR  prognosis guarded  old records reviewed

## 2022-01-01 NOTE — PROGRESS NOTE ADULT - ASSESSMENT
Chief Complaint: shortness of breath.    · Chief Complaint: The patient is a 89y Male complaining of shortness of breath.  · HPI Objective Statement: 89yo M w/ PMHx of Parkinson's Disease (on no medications), CAD (s/p stent), Bladder cancer (s/p tumor resection) sent from [facility] for pna, lethargy, fevers.  pt currently on ABx cefepime, doxy, flagyl for sacral ulcer and colitis, has xr showing bl pna. sent to ed  pt does not provide history      PAST MEDICAL/SURGICAL/FAMILY/SOCIAL HISTORY:    Past Medical, Past Surgical, and Family History:  PAST MEDICAL HISTORY:  Bladder cancer     Parkinsons     Shoulder fracture     Stented coronary artery over 15 years ago as per patient.     PAST SURGICAL HISTORY:  S/P cardiac catheterization.       patient with infiltrate on cxr and cough and fever   and poor po intake    do iv abx ,   ID eval    hydrate   dvt and gi prophylax  prognosis is poor     12.30.21   weak    covid + , ID eval , iv abx ,   d/w wife    agrees to palliative care and dnr and dni    on 12.31 21 as per ID:    COVID-19 PNA +/- superimposed bacterial PNA  AHRF on O2  Fevers  COVID-19-high risk for decompensation EARLY INFLAMMATORY PHASE (7-14 days post symptom onset)   Patient is not a candidate for mAB at this time given that he was hypoxemic on admission and currently on O2 therapy--d/w Dr. Anderson as well.  -would start remdesivir x5 days until 1/3/2022  -would start steroids/AC on this patient  -infectious w/u pending--will f/u  -imaging reviewed CXR basilar consolidations  -trend biomarkers  -trend temps/WBC  -maintain aspiration precautions  -isolation precautions per infection control policies  -Would c/w zosyn for possible superimposed bacterial PNA---duration pending clinical improvement    Colitis/sacral ulcer  -on zosyn as above, no need to c/w abx from NH    Infectious Diseases will continue to follow. Please call with any questions.

## 2022-01-01 NOTE — PROGRESS NOTE ADULT - SUBJECTIVE AND OBJECTIVE BOX
PCP  Subjective:   in bed , depressed , weak    Objective:   Vital Signs Last 24 Hrs  T(C): 36.3 (22 @ 12:58), Max: 37.1 (21 @ 21:14)  T(F): 97.4 (22 @ 12:58), Max: 98.7 (21 @ 21:14)  HR: 95 (22 @ 12:58) (70 - 95)  BP: 122/68 (22 @ 12:58) (116/65 - 149/72)  BP(mean): --  RR: 18 (22 @ 12:58) (18 - 18)  SpO2: 92% (22 @ 12:58) (91% - 92%)  Daily     Daily Weight in k.2 (2022 05:24)    GENERAL:  wdwn male ,weak ., awake ,  EYES: eomi  NECK: supple  CHEST/LUNG: rales , bilateral   HEART: s1 s2 regular  ABDOMEN:  soft nt + bs  EXTREMITIES:  no edema  SKIN:  warm , pale , weak   CNS:  awake , alert ,   non focal,  weak ,     Allergies: Allergies    clindamycin (Unknown)  Levaquin (Unknown)    Intolerances        Home Medications:  acetaminophen 325 mg oral tablet: 2 tab(s) orally every 6 hours, As needed, for pain (30 Dec 2021 10:54)  Acidophilus with Pectin oral capsule: 1 cap(s) orally 2 times a day (30 Dec 2021 10:54)  aspirin 81 mg oral tablet, chewable: 1 tab(s) orally once a day (30 Dec 2021 10:54)  cefpodoxime 200 mg oral tablet: 1 tab(s) orally every 12 hours for 7 days    *Start 2021 (30 Dec 2021 10:54)  Dulcolax Laxative 10 mg rectal suppository: 1 suppository(ies) rectal once a day, As Needed if no bowel movement results after M.O.M. (30 Dec 2021 10:54)  ferrous sulfate 325 mg (65 mg elemental iron) oral tablet: 1 tab(s) orally 2 times a day (30 Dec 2021 10:54)  Fleet Enema 19 g-7 g rectal enema: 133 milliliter(s) rectal once a day, As Needed if no bowel movement results for 4-8 hours after Dulcolax sup (30 Dec 2021 10:54)  Icy Hot Extra Strength 5% topical pad: Apply topically once a day to lower back (30 Dec 2021 10:54)  Milk of Magnesia 8% oral suspension: 30 milliliter(s) orally once a day, As Needed if no bowel movement in 6 consecutive shifts (30 Dec 2021 10:54)  mirtazapine 15 mg oral tablet: 1 tab(s) orally once a day (at bedtime) (30 Dec 2021 10:54)  oxyCODONE 5 mg oral tablet: 1 tab(s) orally every 8 hours, As Needed pain (30 Dec 2021 10:54)  tamsulosin 0.4 mg oral capsule: 1 cap(s) orally once a day (at bedtime) (30 Dec 2021 10:54)    Medications:   acetaminophen     Tablet .. 650 milliGRAM(s) Oral every 6 hours PRN  aspirin  chewable 81 milliGRAM(s) Oral daily  dexAMETHasone     Tablet 6 milliGRAM(s) Oral daily  enoxaparin Injectable 30 milliGRAM(s) SubCutaneous daily  guaiFENesin Oral Liquid (Sugar-Free) 200 milliGRAM(s) Oral every 6 hours PRN  lactobacillus acidophilus 1 Tablet(s) Oral two times a day  multivitamin 1 Tablet(s) Oral daily  piperacillin/tazobactam IVPB.. 3.375 Gram(s) IV Intermittent every 12 hours  remdesivir  IVPB 100 milliGRAM(s) IV Intermittent every 24 hours  remdesivir  IVPB   IV Intermittent   tamsulosin 0.4 milliGRAM(s) Oral at bedtime      LABS:             @ 16:38  INR 1.03        CAPILLARY BLOOD GLUCOSE              RECENT CULTURES:  Culture Results:   No growth to date. ( @ 00:43)  Culture Results:   No growth to date. ( @ 00:43)  Culture Results:   No growth ( @ 00:38)        Culture - Blood (collected 21 @ 00:43)  Source: .Blood Blood-Peripheral  Preliminary Report (21 @ 01:02):    No growth to date.    Culture - Blood (collected 21 @ 00:43)  Source: .Blood Blood-Peripheral  Preliminary Report (21 @ 01:02):    No growth to date.    Culture - Urine (collected 21 @ 00:38)  Source: Clean Catch Clean Catch (Midstream)  Final Report (21 @ 22:38):    No growth

## 2022-01-01 NOTE — PROGRESS NOTE ADULT - SUBJECTIVE AND OBJECTIVE BOX
[facility], Division of Infectious Diseases  JASVIR Pederson Y. Patel, S. Shah, G. [facility]  [phone]    Name: HAYDER MEYER  Age: 89y  Gender: Male  MRN: [id number]    Interval History:  Patient seen and examined at bedside this morning  No acute overnight events. Afebrile overnight  Continues to remain on O2  Notes reviewed    Antibiotics:  piperacillin/tazobactam IVPB.. 3.375 Gram(s) IV Intermittent every 12 hours  remdesivir  IVPB 100 milliGRAM(s) IV Intermittent every 24 hours  remdesivir  IVPB   IV Intermittent       Medications:  acetaminophen     Tablet .. 650 milliGRAM(s) Oral every 6 hours PRN  aspirin  chewable 81 milliGRAM(s) Oral daily  dexAMETHasone     Tablet 6 milliGRAM(s) Oral daily  enoxaparin Injectable 30 milliGRAM(s) SubCutaneous daily  guaiFENesin Oral Liquid (Sugar-Free) 200 milliGRAM(s) Oral every 6 hours PRN  lactobacillus acidophilus 1 Tablet(s) Oral two times a day  multivitamin 1 Tablet(s) Oral daily  piperacillin/tazobactam IVPB.. 3.375 Gram(s) IV Intermittent every 12 hours  remdesivir  IVPB   IV Intermittent   remdesivir  IVPB 100 milliGRAM(s) IV Intermittent every 24 hours  tamsulosin 0.4 milliGRAM(s) Oral at bedtime      Review of Systems:  unable to obtain    Allergies: clindamycin (Unknown)  Levaquin (Unknown)    For details regarding the patient's past medical history, social history, family history, and other miscellaneous elements, please refer the initial infectious diseases consultation and/or the admitting history and physical examination for this admission.    Objective:  Vitals:   T(C): 36.4 (01-01-22 @ 05:24), Max: 37.1 (12-31-21 @ 21:14)  HR: 80 (01-01-22 @ 05:24) (70 - 81)  BP: 135/71 (01-01-22 @ 05:24) (104/66 - 149/72)  RR: 18 (01-01-22 @ 05:24) (18 - 20)  SpO2: 91% (01-01-22 @ 05:24) (91% - 93%)    Physical Examination:  General: no acute distress  HEENT: NC/AT, EOMI, anicteric, no oral lesions  Neck: supple, no palpable LAD  Cardio: S1, S2 heard, RRR, no murmurs  Resp: decreased b/l breath sounds  Abd: soft, NT, ND, + bowel sounds  Ext: no edema or cyanosis  Skin: warm, dry, no visible rash      Laboratory Studies:  CBC:   CMP:             Microbiology: reviewed    Culture - Blood (collected 12-30-21 @ 00:43)  Source: .Blood Blood-Peripheral  Preliminary Report (12-31-21 @ 01:02):    No growth to date.    Culture - Blood (collected 12-30-21 @ 00:43)  Source: .Blood Blood-Peripheral  Preliminary Report (12-31-21 @ 01:02):    No growth to date.    Culture - Urine (collected 12-30-21 @ 00:38)  Source: Clean Catch Clean Catch (Midstream)  Final Report (12-30-21 @ 22:38):    No growth        Radiology: reviewed       [facility], Division of Infectious Diseases  JASVIR Pederson Y. Patel, S. Shah, G. [facility]  [phone]    Name: HAYDER MEYER  Age: 89y  Gender: Male  MRN: [id number]    Interval History:  Patient seen at bedside this morning  No acute overnight events. Afebrile overnight  Continues to remain on O2  Notes reviewed    Antibiotics:  piperacillin/tazobactam IVPB.. 3.375 Gram(s) IV Intermittent every 12 hours  remdesivir  IVPB 100 milliGRAM(s) IV Intermittent every 24 hours  remdesivir  IVPB   IV Intermittent       Medications:  acetaminophen     Tablet .. 650 milliGRAM(s) Oral every 6 hours PRN  aspirin  chewable 81 milliGRAM(s) Oral daily  dexAMETHasone     Tablet 6 milliGRAM(s) Oral daily  enoxaparin Injectable 30 milliGRAM(s) SubCutaneous daily  guaiFENesin Oral Liquid (Sugar-Free) 200 milliGRAM(s) Oral every 6 hours PRN  lactobacillus acidophilus 1 Tablet(s) Oral two times a day  multivitamin 1 Tablet(s) Oral daily  piperacillin/tazobactam IVPB.. 3.375 Gram(s) IV Intermittent every 12 hours  remdesivir  IVPB   IV Intermittent   remdesivir  IVPB 100 milliGRAM(s) IV Intermittent every 24 hours  tamsulosin 0.4 milliGRAM(s) Oral at bedtime      Review of Systems:  unable to obtain    Allergies: clindamycin (Unknown)  Levaquin (Unknown)    For details regarding the patient's past medical history, social history, family history, and other miscellaneous elements, please refer the initial infectious diseases consultation and/or the admitting history and physical examination for this admission.    Objective:  Vitals:   T(C): 36.4 (01-01-22 @ 05:24), Max: 37.1 (12-31-21 @ 21:14)  HR: 80 (01-01-22 @ 05:24) (70 - 81)  BP: 135/71 (01-01-22 @ 05:24) (104/66 - 149/72)  RR: 18 (01-01-22 @ 05:24) (18 - 20)  SpO2: 91% (01-01-22 @ 05:24) (91% - 93%)    Physical Examination:  General: no acute distress  HEENT: NC/AT, EOMI, anicteric, no oral lesions  Neck: supple, no palpable LAD  Cardio: S1, S2 heard, RRR, no murmurs  Resp: decreased b/l breath sounds  Abd: soft, NT, ND, + bowel sounds  Ext: no edema or cyanosis  Skin: warm, dry, no visible rash      Laboratory Studies:  CBC:   CMP:             Microbiology: reviewed    Culture - Blood (collected 12-30-21 @ 00:43)  Source: .Blood Blood-Peripheral  Preliminary Report (12-31-21 @ 01:02):    No growth to date.    Culture - Blood (collected 12-30-21 @ 00:43)  Source: .Blood Blood-Peripheral  Preliminary Report (12-31-21 @ 01:02):    No growth to date.    Culture - Urine (collected 12-30-21 @ 00:38)  Source: Clean Catch Clean Catch (Midstream)  Final Report (12-30-21 @ 22:38):    No growth        Radiology: reviewed

## 2022-01-01 NOTE — PROGRESS NOTE ADULT - ASSESSMENT
89yo M w/ PMHx of Parkinson's Disease (on no medications), CAD (s/p stent), Bladder cancer (s/p tumor resection) sent from [facility] for pna, lethargy, fevers.  pt currently on ABx cefepime, doxy, flagyl for sacral ulcer and colitis, has xr showing bl pna. sent to ed  pt does not provide history    COVID-19 PNA +/- superimposed bacterial PNA  AHRF on O2  Fevers  Leukopenia  COVID-19-high risk for decompensation EARLY INFLAMMATORY PHASE (7-14 days post symptom onset)   -c/w remdesivir x5 day course  -c/w steroids x10 day course  -infectious w/u negative to date  -imaging reviewed--CXR basilar consolidations  -trend biomarkers  -trend temps/WBC  --fevers noted, likely course of disease, will c/w monitoring  -maintain aspiration precautions  -isolation precautions per infection control policies  -Would c/w zosyn for possible superimposed bacterial PNA---duration pending clinical improvement    Colitis/sacral ulcer  -on zosyn as above, no need to c/w abx from NH    Infectious Diseases will continue to follow. Please call with any questions.   Hina Triplett M.D.  [facility], Division of Infectious Diseases [phone]

## 2022-01-01 NOTE — PROGRESS NOTE ADULT - SUBJECTIVE AND OBJECTIVE BOX
Date/Time Patient Seen:  		  Referring MD:   Data Reviewed	       Patient is a 89y old  Male who presents with a chief complaint of fever    weakness (31 Dec 2021 10:13)      Subjective/HPI     PAST MEDICAL & SURGICAL HISTORY:  Shoulder fracture    Bladder cancer    Stented coronary artery  over 15 years ago as per patient    Parkinsons    S/P cardiac catheterization          Medication list         MEDICATIONS  (STANDING):  aspirin  chewable 81 milliGRAM(s) Oral daily  dexAMETHasone     Tablet 6 milliGRAM(s) Oral daily  enoxaparin Injectable 30 milliGRAM(s) SubCutaneous daily  lactobacillus acidophilus 1 Tablet(s) Oral two times a day  multivitamin 1 Tablet(s) Oral daily  piperacillin/tazobactam IVPB.. 3.375 Gram(s) IV Intermittent every 12 hours  remdesivir  IVPB 100 milliGRAM(s) IV Intermittent every 24 hours  remdesivir  IVPB   IV Intermittent   tamsulosin 0.4 milliGRAM(s) Oral at bedtime    MEDICATIONS  (PRN):  acetaminophen     Tablet .. 650 milliGRAM(s) Oral every 6 hours PRN Temp greater or equal to 38C (100.4F), Mild Pain (1 - 3)  guaiFENesin Oral Liquid (Sugar-Free) 200 milliGRAM(s) Oral every 6 hours PRN Cough         Vitals log        ICU Vital Signs Last 24 Hrs  T(C): 36.4 (01 Jan 2022 05:24), Max: 37.1 (31 Dec 2021 21:14)  T(F): 97.5 (01 Jan 2022 05:24), Max: 98.7 (31 Dec 2021 21:14)  HR: 80 (01 Jan 2022 05:24) (70 - 81)  BP: 135/71 (01 Jan 2022 05:24) (104/66 - 149/72)  BP(mean): --  ABP: --  ABP(mean): --  RR: 18 (01 Jan 2022 05:24) (18 - 20)  SpO2: 91% (01 Jan 2022 05:24) (91% - 93%)           Input and Output:  I&O's Detail    30 Dec 2021 07:01  -  31 Dec 2021 07:00  --------------------------------------------------------  IN:    Oral Fluid: 360 mL  Total IN: 360 mL    OUT:  Total OUT: 0 mL    Total NET: 360 mL      31 Dec 2021 07:01  -  01 Jan 2022 06:38  --------------------------------------------------------  IN:    Oral Fluid: 460 mL  Total IN: 460 mL    OUT:  Total OUT: 0 mL    Total NET: 460 mL          Lab Data                        8.8    2.55  )-----------( 126      ( 30 Dec 2021 08:57 )             28.1     12-30    151<H>  |  121<H>  |  36<H>  ----------------------------<  103<H>  3.3<L>   |  25  |  2.30<H>    Ca    8.1<L>      30 Dec 2021 08:57              Review of Systems	      Objective     Physical Examination      heart s1s2  lung dec BS  abd soft  head nc    Pertinent Lab findings & Imaging      Lalo:  NO   Adequate UO     I&O's Detail    30 Dec 2021 07:01  -  31 Dec 2021 07:00  --------------------------------------------------------  IN:    Oral Fluid: 360 mL  Total IN: 360 mL    OUT:  Total OUT: 0 mL    Total NET: 360 mL      31 Dec 2021 07:01  -  01 Jan 2022 06:38  --------------------------------------------------------  IN:    Oral Fluid: 460 mL  Total IN: 460 mL    OUT:  Total OUT: 0 mL    Total NET: 460 mL               Discussed with:     Cultures:	        Radiology

## 2022-01-02 RX ORDER — ACETAMINOPHEN 500 MG
650 TABLET ORAL ONCE
Refills: 0 | Status: COMPLETED | OUTPATIENT
Start: 2022-01-02 | End: 2022-01-02

## 2022-01-02 RX ADMIN — Medication 650 MILLIGRAM(S): at 13:05

## 2022-01-02 RX ADMIN — ENOXAPARIN SODIUM 30 MILLIGRAM(S): 100 INJECTION SUBCUTANEOUS at 12:05

## 2022-01-02 RX ADMIN — Medication 1 TABLET(S): at 16:58

## 2022-01-02 RX ADMIN — PIPERACILLIN AND TAZOBACTAM 25 GRAM(S): 4; .5 INJECTION, POWDER, LYOPHILIZED, FOR SOLUTION INTRAVENOUS at 05:07

## 2022-01-02 RX ADMIN — PIPERACILLIN AND TAZOBACTAM 25 GRAM(S): 4; .5 INJECTION, POWDER, LYOPHILIZED, FOR SOLUTION INTRAVENOUS at 16:57

## 2022-01-02 RX ADMIN — Medication 1 TABLET(S): at 05:08

## 2022-01-02 RX ADMIN — REMDESIVIR 500 MILLIGRAM(S): 5 INJECTION INTRAVENOUS at 16:57

## 2022-01-02 RX ADMIN — Medication 1 TABLET(S): at 12:05

## 2022-01-02 RX ADMIN — TAMSULOSIN HYDROCHLORIDE 0.4 MILLIGRAM(S): 0.4 CAPSULE ORAL at 21:14

## 2022-01-02 RX ADMIN — Medication 650 MILLIGRAM(S): at 21:13

## 2022-01-02 RX ADMIN — Medication 650 MILLIGRAM(S): at 06:45

## 2022-01-02 RX ADMIN — Medication 6 MILLIGRAM(S): at 05:08

## 2022-01-02 RX ADMIN — Medication 650 MILLIGRAM(S): at 12:05

## 2022-01-02 RX ADMIN — Medication 81 MILLIGRAM(S): at 12:05

## 2022-01-02 RX ADMIN — Medication 650 MILLIGRAM(S): at 07:42

## 2022-01-02 NOTE — PROGRESS NOTE ADULT - ASSESSMENT
Chief Complaint: shortness of breath.    · Chief Complaint: The patient is a 89y Male complaining of shortness of breath.  · HPI Objective Statement: 89yo M w/ PMHx of Parkinson's Disease (on no medications), CAD (s/p stent), Bladder cancer (s/p tumor resection) sent from [facility] for pna, lethargy, fevers.  pt currently on ABx cefepime, doxy, flagyl for sacral ulcer and colitis, has xr showing bl pna. sent to ed  pt does not provide history      PAST MEDICAL/SURGICAL/FAMILY/SOCIAL HISTORY:    Past Medical, Past Surgical, and Family History:  PAST MEDICAL HISTORY:  Bladder cancer     Parkinsons     Shoulder fracture     Stented coronary artery over 15 years ago as per patient.     PAST SURGICAL HISTORY:  S/P cardiac catheterization.       patient with infiltrate on cxr and cough and fever   and poor po intake    do iv abx ,   ID eval    hydrate   dvt and gi prophylax  prognosis is poor     12.30.21   weak    covid + , ID eval , iv abx ,   d/w wife    agrees to palliative care and dnr and dni    on 12.31 21 as per ID:    COVID-19 PNA +/- superimposed bacterial PNA  AHRF on O2  Fevers  COVID-19-high risk for decompensation EARLY INFLAMMATORY PHASE (7-14 days post symptom onset)   Patient is not a candidate for mAB at this time given that he was hypoxemic on admission and currently on O2 therapy--d/w Dr. Anderson as well.  -would start remdesivir x5 days until 1/3/2022  -would start steroids/AC on this patient  -infectious w/u pending--will f/u  -imaging reviewed CXR basilar consolidations  -trend biomarkers  -trend temps/WBC  -maintain aspiration precautions  -isolation precautions per infection control policies  -Would c/w zosyn for possible superimposed bacterial PNA---duration pending clinical improvement    Colitis/sacral ulcer  -on zosyn as above, no need to c/w abx from NH    Infectious Diseases will continue to follow. Please call with any questions.     on 1.2.2022 plan as per ID:  COVID-19 PNA +/- superimposed bacterial PNA  AHRF on O2  Fevers  Leukopenia  COVID-19-high risk for decompensation EARLY INFLAMMATORY PHASE (7-14 days post symptom onset)   -c/w remdesivir x5 day course  -c/w steroids x10 day course  -infectious w/u negative to date  -imaging reviewed--CXR basilar consolidations  -trend biomarkers  -trend temps/WBC  --fevers noted, likely course of disease, will c/w monitoring  -maintain aspiration precautions  -isolation precautions per infection control policies  -Would c/w zosyn for possible superimposed bacterial PNA---x5-7 day course pending clinical improvement    Colitis/sacral ulcer  -on zosyn as above, no need to c/w abx from NH    Infectious Diseases will continue to follow. Please call with any questions.

## 2022-01-02 NOTE — PROGRESS NOTE ADULT - SUBJECTIVE AND OBJECTIVE BOX
PCP  Subjective:   in bed , weak , awake     Objective:   Vital Signs Last 24 Hrs  T(C): 38.7 (22 @ 12:08), Max: 38.7 (22 @ 12:08)  T(F): 101.7 (22 @ 12:08), Max: 101.7 (22 @ 12:08)  HR: 88 (22 @ 12:08) (88 - 99)  BP: 131/64 (22 @ 12:08) (114/65 - 148/68)  BP(mean): --  RR: 18 (22 @ 12:08) (18 - 20)  SpO2: 92% (22 @ 12:08) (91% - 94%)  Daily     Daily Weight in k.3 (2022 05:09)  GENERAL:  wdwn male ,weak ., awake ,  EYES: eomi  NECK: supple  CHEST/LUNG: rales , bilateral   HEART: s1 s2 regular  ABDOMEN:  soft nt + bs  EXTREMITIES:  no edema  SKIN:  warm , pale , weak   CNS:  awake , alert ,   non focal,  weak ,       Allergies: Allergies    clindamycin (Unknown)  Levaquin (Unknown)    Intolerances        Home Medications:  acetaminophen 325 mg oral tablet: 2 tab(s) orally every 6 hours, As needed, for pain (30 Dec 2021 10:54)  Acidophilus with Pectin oral capsule: 1 cap(s) orally 2 times a day (30 Dec 2021 10:54)  aspirin 81 mg oral tablet, chewable: 1 tab(s) orally once a day (30 Dec 2021 10:54)  cefpodoxime 200 mg oral tablet: 1 tab(s) orally every 12 hours for 7 days    *Start 2021 (30 Dec 2021 10:54)  Dulcolax Laxative 10 mg rectal suppository: 1 suppository(ies) rectal once a day, As Needed if no bowel movement results after M.O.M. (30 Dec 2021 10:54)  ferrous sulfate 325 mg (65 mg elemental iron) oral tablet: 1 tab(s) orally 2 times a day (30 Dec 2021 10:54)  Fleet Enema 19 g-7 g rectal enema: 133 milliliter(s) rectal once a day, As Needed if no bowel movement results for 4-8 hours after Dulcolax sup (30 Dec 2021 10:54)  Icy Hot Extra Strength 5% topical pad: Apply topically once a day to lower back (30 Dec 2021 10:54)  Milk of Magnesia 8% oral suspension: 30 milliliter(s) orally once a day, As Needed if no bowel movement in 6 consecutive shifts (30 Dec 2021 10:54)  mirtazapine 15 mg oral tablet: 1 tab(s) orally once a day (at bedtime) (30 Dec 2021 10:54)  oxyCODONE 5 mg oral tablet: 1 tab(s) orally every 8 hours, As Needed pain (30 Dec 2021 10:54)  tamsulosin 0.4 mg oral capsule: 1 cap(s) orally once a day (at bedtime) (30 Dec 2021 10:54)    Medications:   acetaminophen     Tablet .. 650 milliGRAM(s) Oral every 6 hours PRN  aspirin  chewable 81 milliGRAM(s) Oral daily  dexAMETHasone     Tablet 6 milliGRAM(s) Oral daily  enoxaparin Injectable 30 milliGRAM(s) SubCutaneous daily  guaiFENesin Oral Liquid (Sugar-Free) 200 milliGRAM(s) Oral every 6 hours PRN  lactobacillus acidophilus 1 Tablet(s) Oral two times a day  multivitamin 1 Tablet(s) Oral daily  piperacillin/tazobactam IVPB.. 3.375 Gram(s) IV Intermittent every 12 hours  remdesivir  IVPB 100 milliGRAM(s) IV Intermittent every 24 hours  remdesivir  IVPB   IV Intermittent   tamsulosin 0.4 milliGRAM(s) Oral at bedtime      LABS:             @ 16:38  INR 1.03        CAPILLARY BLOOD GLUCOSE              RECENT CULTURES:  Culture Results:   No growth to date. ( @ 00:43)  Culture Results:   No growth to date. ( @ 00:43)  Culture Results:   No growth ( @ 00:38)        Culture - Blood (collected 21 @ 00:43)  Source: .Blood Blood-Peripheral  Preliminary Report (21 @ 01:02):    No growth to date.    Culture - Blood (collected 21 @ 00:43)  Source: .Blood Blood-Peripheral  Preliminary Report (21 @ 01:02):    No growth to date.    Culture - Urine (collected 21 @ 00:38)  Source: Clean Catch Clean Catch (Midstream)  Final Report (21 @ 22:38):    No growth

## 2022-01-02 NOTE — PROGRESS NOTE ADULT - ASSESSMENT
89yo M w/ PMHx of Parkinson's Disease (on no medications), CAD (s/p stent), Bladder cancer (s/p tumor resection) sent from [facility] for pna, lethargy, fevers.  pt currently on ABx cefepime, doxy, flagyl for sacral ulcer and colitis, has xr showing bl pna. sent to ed  pt does not provide history    COVID-19 PNA +/- superimposed bacterial PNA  AHRF on O2  Fevers  Leukopenia  COVID-19-high risk for decompensation EARLY INFLAMMATORY PHASE (7-14 days post symptom onset)   -c/w remdesivir x5 day course  -c/w steroids x10 day course  -infectious w/u negative to date  -imaging reviewed--CXR basilar consolidations  -trend biomarkers  -trend temps/WBC  --fevers noted, likely course of disease, will c/w monitoring  -maintain aspiration precautions  -isolation precautions per infection control policies  -Would c/w zosyn for possible superimposed bacterial PNA---x5-7 day course pending clinical improvement    Colitis/sacral ulcer  -on zosyn as above, no need to c/w abx from NH    Infectious Diseases will continue to follow. Please call with any questions.   Hina Triplett M.D.  [facility], Division of Infectious Diseases [phone]

## 2022-01-02 NOTE — PROGRESS NOTE ADULT - ASSESSMENT
89 yo M w/ PMHx of Parkinson's Disease (on no medications), CAD (s/p stent), Bladder cancer    covid  ckd  htn  OP  OA  eval for Dysphagia  hypoxemia  ? PNA     on RA  procalcitonin - crp - noted  VS noted  labs reviewed  on PO Liquid diet  ID f/u noted    SLP eval noted  Parkinson's hx - risk for aspiration  Covid - hypoxemia - remdesivir and decadron  monitor VS and HD and Sat  serial D dimer  DVT p  assist with needs - ADL  isolation precs  assist with needs - ADL  GOC - DNR  prognosis guarded  old records reviewed

## 2022-01-02 NOTE — PROGRESS NOTE ADULT - SUBJECTIVE AND OBJECTIVE BOX
Date/Time Patient Seen:  		  Referring MD:   Data Reviewed	       Patient is a 89y old  Male who presents with a chief complaint of fever    weakness (01 Jan 2022 13:51)      Subjective/HPI     PAST MEDICAL & SURGICAL HISTORY:  Shoulder fracture    Bladder cancer    Stented coronary artery  over 15 years ago as per patient    Parkinsons    S/P cardiac catheterization          Medication list         MEDICATIONS  (STANDING):  aspirin  chewable 81 milliGRAM(s) Oral daily  dexAMETHasone     Tablet 6 milliGRAM(s) Oral daily  enoxaparin Injectable 30 milliGRAM(s) SubCutaneous daily  lactobacillus acidophilus 1 Tablet(s) Oral two times a day  multivitamin 1 Tablet(s) Oral daily  piperacillin/tazobactam IVPB.. 3.375 Gram(s) IV Intermittent every 12 hours  remdesivir  IVPB 100 milliGRAM(s) IV Intermittent every 24 hours  remdesivir  IVPB   IV Intermittent   tamsulosin 0.4 milliGRAM(s) Oral at bedtime    MEDICATIONS  (PRN):  acetaminophen     Tablet .. 650 milliGRAM(s) Oral every 6 hours PRN Temp greater or equal to 38C (100.4F), Mild Pain (1 - 3)  guaiFENesin Oral Liquid (Sugar-Free) 200 milliGRAM(s) Oral every 6 hours PRN Cough         Vitals log        ICU Vital Signs Last 24 Hrs  T(C): 36.8 (02 Jan 2022 05:09), Max: 37.8 (01 Jan 2022 20:00)  T(F): 98.2 (02 Jan 2022 05:09), Max: 100.1 (01 Jan 2022 20:00)  HR: 98 (02 Jan 2022 05:09) (95 - 99)  BP: 114/65 (02 Jan 2022 05:09) (114/65 - 148/68)  BP(mean): --  ABP: --  ABP(mean): --  RR: 18 (02 Jan 2022 05:09) (18 - 20)  SpO2: 92% (02 Jan 2022 05:09) (91% - 94%)           Input and Output:  I&O's Detail    31 Dec 2021 07:01  -  01 Jan 2022 07:00  --------------------------------------------------------  IN:    Oral Fluid: 460 mL  Total IN: 460 mL    OUT:  Total OUT: 0 mL    Total NET: 460 mL      01 Jan 2022 07:01  -  02 Jan 2022 06:17  --------------------------------------------------------  IN:    IV PiggyBack: 50 mL    Oral Fluid: 360 mL  Total IN: 410 mL    OUT:    Incontinent per Condom Catheter (mL): 200 mL    Voided (mL): 400 mL  Total OUT: 600 mL    Total NET: -190 mL          Lab Data                  Review of Systems	      Objective     Physical Examination    heart s1s2  lung dec BS  abd soft  head nc  frail      Pertinent Lab findings & Imaging      Lalo:  NO   Adequate UO     I&O's Detail    31 Dec 2021 07:01  -  01 Jan 2022 07:00  --------------------------------------------------------  IN:    Oral Fluid: 460 mL  Total IN: 460 mL    OUT:  Total OUT: 0 mL    Total NET: 460 mL      01 Jan 2022 07:01  -  02 Jan 2022 06:17  --------------------------------------------------------  IN:    IV PiggyBack: 50 mL    Oral Fluid: 360 mL  Total IN: 410 mL    OUT:    Incontinent per Condom Catheter (mL): 200 mL    Voided (mL): 400 mL  Total OUT: 600 mL    Total NET: -190 mL               Discussed with:     Cultures:	        Radiology

## 2022-01-02 NOTE — PROGRESS NOTE ADULT - SUBJECTIVE AND OBJECTIVE BOX
[facility], Division of Infectious Diseases  JASVIR Pederson Y. Patel, S. Shah, G. [facility]  [phone]    Name: HAYDER MEYER  Age: 89y  Gender: Male  MRN: [id number]    Interval History:  Patient seen and examined at bedside this morning  No acute overnight events. Febrile overnight to 100.6F  Continues to remain on NC  Notes reviewed    Antibiotics:  piperacillin/tazobactam IVPB.. 3.375 Gram(s) IV Intermittent every 12 hours  remdesivir  IVPB 100 milliGRAM(s) IV Intermittent every 24 hours  remdesivir  IVPB   IV Intermittent       Medications:  acetaminophen     Tablet .. 650 milliGRAM(s) Oral every 6 hours PRN  aspirin  chewable 81 milliGRAM(s) Oral daily  dexAMETHasone     Tablet 6 milliGRAM(s) Oral daily  enoxaparin Injectable 30 milliGRAM(s) SubCutaneous daily  guaiFENesin Oral Liquid (Sugar-Free) 200 milliGRAM(s) Oral every 6 hours PRN  lactobacillus acidophilus 1 Tablet(s) Oral two times a day  multivitamin 1 Tablet(s) Oral daily  piperacillin/tazobactam IVPB.. 3.375 Gram(s) IV Intermittent every 12 hours  remdesivir  IVPB 100 milliGRAM(s) IV Intermittent every 24 hours  remdesivir  IVPB   IV Intermittent   tamsulosin 0.4 milliGRAM(s) Oral at bedtime      Review of Systems:  unable to obtain    Allergies: clindamycin (Unknown)  Levaquin (Unknown)    For details regarding the patient's past medical history, social history, family history, and other miscellaneous elements, please refer the initial infectious diseases consultation and/or the admitting history and physical examination for this admission.    Objective:  Vitals:   T(C): 38.1 (01-02-22 @ 06:42), Max: 38.1 (01-02-22 @ 06:42)  HR: 98 (01-02-22 @ 05:09) (95 - 99)  BP: 114/65 (01-02-22 @ 05:09) (114/65 - 148/68)  RR: 18 (01-02-22 @ 05:09) (18 - 20)  SpO2: 92% (01-02-22 @ 05:09) (91% - 94%)    Physical Examination:  General: no acute distress  HEENT: NC/AT, EOMI  Neck: supple, no palpable LAD  Cardio: S1, S2 heard, RRR, no murmurs  Resp: decreased b/l breath sounds  Abd: soft, NT, ND, + bowel sounds  Ext: no edema or cyanosis  Skin: warm, dry, no visible rash      Laboratory Studies:  CBC:   CMP:             Microbiology: reviewed    Culture - Blood (collected 12-30-21 @ 00:43)  Source: .Blood Blood-Peripheral  Preliminary Report (12-31-21 @ 01:02):    No growth to date.    Culture - Blood (collected 12-30-21 @ 00:43)  Source: .Blood Blood-Peripheral  Preliminary Report (12-31-21 @ 01:02):    No growth to date.    Culture - Urine (collected 12-30-21 @ 00:38)  Source: Clean Catch Clean Catch (Midstream)  Final Report (12-30-21 @ 22:38):    No growth        Radiology: reviewed

## 2022-01-03 LAB
ANION GAP SERPL CALC-SCNC: 9 MMOL/L — SIGNIFICANT CHANGE UP (ref 5–17)
ANISOCYTOSIS BLD QL: SIGNIFICANT CHANGE UP
BASOPHILS # BLD AUTO: 0 K/UL — SIGNIFICANT CHANGE UP (ref 0–0.2)
BASOPHILS NFR BLD AUTO: 0 % — SIGNIFICANT CHANGE UP (ref 0–2)
BUN SERPL-MCNC: 70 MG/DL — HIGH (ref 7–23)
CALCIUM SERPL-MCNC: 8.6 MG/DL — SIGNIFICANT CHANGE UP (ref 8.5–10.1)
CHLORIDE SERPL-SCNC: 125 MMOL/L — HIGH (ref 96–108)
CO2 SERPL-SCNC: 21 MMOL/L — LOW (ref 22–31)
CREAT SERPL-MCNC: 3.5 MG/DL — HIGH (ref 0.5–1.3)
ELLIPTOCYTES BLD QL SMEAR: SLIGHT — SIGNIFICANT CHANGE UP
EOSINOPHIL # BLD AUTO: 0 K/UL — SIGNIFICANT CHANGE UP (ref 0–0.5)
EOSINOPHIL NFR BLD AUTO: 0 % — SIGNIFICANT CHANGE UP (ref 0–6)
GLUCOSE SERPL-MCNC: 140 MG/DL — HIGH (ref 70–99)
HCT VFR BLD CALC: 29.8 % — LOW (ref 39–50)
HGB BLD-MCNC: 9.8 G/DL — LOW (ref 13–17)
HYPOCHROMIA BLD QL: SLIGHT — SIGNIFICANT CHANGE UP
LYMPHOCYTES # BLD AUTO: 0.26 K/UL — LOW (ref 1–3.3)
LYMPHOCYTES # BLD AUTO: 4 % — LOW (ref 13–44)
MACROCYTES BLD QL: SLIGHT — SIGNIFICANT CHANGE UP
MANUAL SMEAR VERIFICATION: SIGNIFICANT CHANGE UP
MCHC RBC-ENTMCNC: 30.3 PG — SIGNIFICANT CHANGE UP (ref 27–34)
MCHC RBC-ENTMCNC: 32.9 GM/DL — SIGNIFICANT CHANGE UP (ref 32–36)
MCV RBC AUTO: 92.3 FL — SIGNIFICANT CHANGE UP (ref 80–100)
MICROCYTES BLD QL: SLIGHT — SIGNIFICANT CHANGE UP
MONOCYTES # BLD AUTO: 0.58 K/UL — SIGNIFICANT CHANGE UP (ref 0–0.9)
MONOCYTES NFR BLD AUTO: 9 % — SIGNIFICANT CHANGE UP (ref 2–14)
MYELOCYTES NFR BLD: 1 % — HIGH (ref 0–0)
NEUTROPHILS # BLD AUTO: 5.36 K/UL — SIGNIFICANT CHANGE UP (ref 1.8–7.4)
NEUTROPHILS NFR BLD AUTO: 74 % — SIGNIFICANT CHANGE UP (ref 43–77)
NEUTS BAND # BLD: 9 % — HIGH (ref 0–8)
NRBC # BLD: 0 — SIGNIFICANT CHANGE UP
NRBC # BLD: SIGNIFICANT CHANGE UP /100 WBCS (ref 0–0)
OVALOCYTES BLD QL SMEAR: SLIGHT — SIGNIFICANT CHANGE UP
PLAT MORPH BLD: NORMAL — SIGNIFICANT CHANGE UP
PLATELET # BLD AUTO: 143 K/UL — LOW (ref 150–400)
POIKILOCYTOSIS BLD QL AUTO: SLIGHT — SIGNIFICANT CHANGE UP
POLYCHROMASIA BLD QL SMEAR: SLIGHT — SIGNIFICANT CHANGE UP
POTASSIUM SERPL-MCNC: 2.8 MMOL/L — CRITICAL LOW (ref 3.5–5.3)
POTASSIUM SERPL-SCNC: 2.8 MMOL/L — CRITICAL LOW (ref 3.5–5.3)
RBC # BLD: 3.23 M/UL — LOW (ref 4.2–5.8)
RBC # FLD: 20.8 % — HIGH (ref 10.3–14.5)
RBC BLD AUTO: ABNORMAL
SCHISTOCYTES BLD QL AUTO: SIGNIFICANT CHANGE UP
SODIUM SERPL-SCNC: 155 MMOL/L — HIGH (ref 135–145)
VARIANT LYMPHS # BLD: 3 % — SIGNIFICANT CHANGE UP (ref 0–6)
WBC # BLD: 6.46 K/UL — SIGNIFICANT CHANGE UP (ref 3.8–10.5)
WBC # FLD AUTO: 6.46 K/UL — SIGNIFICANT CHANGE UP (ref 3.8–10.5)

## 2022-01-03 PROCEDURE — 99232 SBSQ HOSP IP/OBS MODERATE 35: CPT

## 2022-01-03 RX ORDER — POTASSIUM CHLORIDE 20 MEQ
10 PACKET (EA) ORAL
Refills: 0 | Status: COMPLETED | OUTPATIENT
Start: 2022-01-03 | End: 2022-01-03

## 2022-01-03 RX ORDER — DEXTROSE MONOHYDRATE, SODIUM CHLORIDE, AND POTASSIUM CHLORIDE 50; .745; 4.5 G/1000ML; G/1000ML; G/1000ML
1000 INJECTION, SOLUTION INTRAVENOUS
Refills: 0 | Status: DISCONTINUED | OUTPATIENT
Start: 2022-01-03 | End: 2022-01-03

## 2022-01-03 RX ORDER — HEPARIN SODIUM 5000 [USP'U]/ML
5000 INJECTION INTRAVENOUS; SUBCUTANEOUS EVERY 8 HOURS
Refills: 0 | Status: DISCONTINUED | OUTPATIENT
Start: 2022-01-04 | End: 2022-01-10

## 2022-01-03 RX ORDER — DEXTROSE MONOHYDRATE, SODIUM CHLORIDE, AND POTASSIUM CHLORIDE 50; .745; 4.5 G/1000ML; G/1000ML; G/1000ML
1000 INJECTION, SOLUTION INTRAVENOUS
Refills: 0 | Status: DISCONTINUED | OUTPATIENT
Start: 2022-01-03 | End: 2022-01-04

## 2022-01-03 RX ADMIN — PIPERACILLIN AND TAZOBACTAM 25 GRAM(S): 4; .5 INJECTION, POWDER, LYOPHILIZED, FOR SOLUTION INTRAVENOUS at 05:44

## 2022-01-03 RX ADMIN — DEXTROSE MONOHYDRATE, SODIUM CHLORIDE, AND POTASSIUM CHLORIDE 100 MILLILITER(S): 50; .745; 4.5 INJECTION, SOLUTION INTRAVENOUS at 12:13

## 2022-01-03 RX ADMIN — Medication 100 MILLIEQUIVALENT(S): at 11:23

## 2022-01-03 RX ADMIN — Medication 1 TABLET(S): at 12:10

## 2022-01-03 RX ADMIN — DEXTROSE MONOHYDRATE, SODIUM CHLORIDE, AND POTASSIUM CHLORIDE 75 MILLILITER(S): 50; .745; 4.5 INJECTION, SOLUTION INTRAVENOUS at 16:31

## 2022-01-03 RX ADMIN — TAMSULOSIN HYDROCHLORIDE 0.4 MILLIGRAM(S): 0.4 CAPSULE ORAL at 21:34

## 2022-01-03 RX ADMIN — Medication 1 TABLET(S): at 05:44

## 2022-01-03 RX ADMIN — Medication 100 MILLIEQUIVALENT(S): at 12:17

## 2022-01-03 RX ADMIN — REMDESIVIR 500 MILLIGRAM(S): 5 INJECTION INTRAVENOUS at 16:31

## 2022-01-03 RX ADMIN — Medication 1 TABLET(S): at 17:49

## 2022-01-03 RX ADMIN — Medication 81 MILLIGRAM(S): at 12:10

## 2022-01-03 RX ADMIN — Medication 6 MILLIGRAM(S): at 05:44

## 2022-01-03 RX ADMIN — PIPERACILLIN AND TAZOBACTAM 25 GRAM(S): 4; .5 INJECTION, POWDER, LYOPHILIZED, FOR SOLUTION INTRAVENOUS at 17:49

## 2022-01-03 RX ADMIN — ENOXAPARIN SODIUM 30 MILLIGRAM(S): 100 INJECTION SUBCUTANEOUS at 12:10

## 2022-01-03 NOTE — PROVIDER CONTACT NOTE (CRITICAL VALUE NOTIFICATION) - ACTION/TREATMENT ORDERED:
No change in plan of care at this time; attending to review chart. Call also placed to infectious disease, Dr. Hina Triplett
As per attending, will review chart and enter orders for replenishment of Potassium

## 2022-01-03 NOTE — CHART NOTE - NSCHARTNOTEFT_GEN_A_CORE
Assessment: patient seen for malnutrition Follow up   89y old  Male who presents with a chief complaint of fever    weakness   patient seen this morning . on full liquids per RN eats with total assist. discussed ensure enlive and ensure pudding supplements  MBSS recommends puree moderately thick 12/31 call to MD by speech noted 1/2 as patient remains on full fluids  1/2 loose BM noted       Factors impacting intake: [ ] none [ ] nausea  [ ] vomiting [ ] diarrhea [ ] constipation  [ ]chewing problems [ x] swallowing issues  [ ] other:     Diet Prescription : Diet, Full Liquid (12-30-21 @ 10:45)    Intake: 0-25% per flow sheet RN states eats well with assist     Current Weight: Weight 1/2 wt 115.3#   % Weight Change    Pertinent Medications: MEDICATIONS  (STANDING):  aspirin  chewable 81 milliGRAM(s) Oral daily  dexAMETHasone     Tablet 6 milliGRAM(s) Oral daily  enoxaparin Injectable 30 milliGRAM(s) SubCutaneous daily  lactobacillus acidophilus 1 Tablet(s) Oral two times a day  multivitamin 1 Tablet(s) Oral daily  piperacillin/tazobactam IVPB.. 3.375 Gram(s) IV Intermittent every 12 hours  potassium chloride  10 mEq/100 mL IVPB 10 milliEquivalent(s) IV Intermittent every 1 hour  remdesivir  IVPB 100 milliGRAM(s) IV Intermittent every 24 hours  remdesivir  IVPB   IV Intermittent   sodium chloride 0.9% with potassium chloride 20 mEq/L 1000 milliLiter(s) (100 mL/Hr) IV Continuous <Continuous>  tamsulosin 0.4 milliGRAM(s) Oral at bedtime    MEDICATIONS  (PRN):  acetaminophen     Tablet .. 650 milliGRAM(s) Oral every 6 hours PRN Temp greater or equal to 38C (100.4F), Mild Pain (1 - 3)  guaiFENesin Oral Liquid (Sugar-Free) 200 milliGRAM(s) Oral every 6 hours PRN Cough    Pertinent Labs: K 2.8 Na 155 BUN 70 Creat 3.5 (KCL IVPB ordered)  Skin: sacrum un-stageable sacrum stage 1 bilateral heel stage 1    Estimated Needs:   [x ] no change since previous assessment  [ ] recalculated:     Previous Nutrition Diagnosis:   [ ] Inadequate Energy Intake [ ]Inadequate Oral Intake [ ] Excessive Energy Intake   [ ] Underweight [x ] Increased Nutrient Needs [ ] Overweight/Obesity   [ ] Altered GI Function [ ] Unintended Weight Loss [ ] Food & Nutrition Related Knowledge Deficit [x ] Malnutrition     Nutrition Diagnosis is [x ] ongoing  [ ] resolved [ ] not applicable     New Nutrition Diagnosis: [x ] swallow difficulties related to motor causes as evidenced by dysphagia puree moderately thick diet per speech      Interventions:   Recommend  [x ] Change Diet To: puree moderately thick with ensure enlive BID and ensure pudding daily   [ ] Nutrition Supplement  [ ] Nutrition Support  [x ] Other: if continuing full fluids recommend ensure enlive and ensure pudding supplements , follow weights    Monitoring and Evaluation:   [ x] PO intake [ x ] Tolerance to diet prescription [ x ] weights [ x ] labs[ x ] follow up per protocol  [ ] other: Assessment: patient seen for malnutrition Follow up   89y old  Male who presents with a chief complaint of fever    weakness   patient seen this morning . on full liquids per RN eats with total assist. discussed ensure enlive and ensure pudding supplements  MBSS recommends puree moderately thick 12/31 call to MD by speech noted 1/2 as patient remains on full fluids  1/2 loose BM noted       Factors impacting intake: [ ] none [ ] nausea  [ ] vomiting [ ] diarrhea [ ] constipation  [ ]chewing problems [ x] swallowing issues  [ ] other:     Diet Prescription : Diet, Full Liquid (12-30-21 @ 10:45)    Intake: 0-25% per flow sheet RN states patient eating poorly needs total assist     Current Weight: Weight 1/2 wt 115.3#   % Weight Change    Pertinent Medications: MEDICATIONS  (STANDING):  aspirin  chewable 81 milliGRAM(s) Oral daily  dexAMETHasone     Tablet 6 milliGRAM(s) Oral daily  enoxaparin Injectable 30 milliGRAM(s) SubCutaneous daily  lactobacillus acidophilus 1 Tablet(s) Oral two times a day  multivitamin 1 Tablet(s) Oral daily  piperacillin/tazobactam IVPB.. 3.375 Gram(s) IV Intermittent every 12 hours  potassium chloride  10 mEq/100 mL IVPB 10 milliEquivalent(s) IV Intermittent every 1 hour  remdesivir  IVPB 100 milliGRAM(s) IV Intermittent every 24 hours  remdesivir  IVPB   IV Intermittent   sodium chloride 0.9% with potassium chloride 20 mEq/L 1000 milliLiter(s) (100 mL/Hr) IV Continuous <Continuous>  tamsulosin 0.4 milliGRAM(s) Oral at bedtime    MEDICATIONS  (PRN):  acetaminophen     Tablet .. 650 milliGRAM(s) Oral every 6 hours PRN Temp greater or equal to 38C (100.4F), Mild Pain (1 - 3)  guaiFENesin Oral Liquid (Sugar-Free) 200 milliGRAM(s) Oral every 6 hours PRN Cough    Pertinent Labs: K 2.8 Na 155 BUN 70 Creat 3.5 (KCL IVPB ordered)  Skin: sacrum un-stageable sacrum stage 1 bilateral heel stage 1    Estimated Needs:   [x ] no change since previous assessment  [ ] recalculated:     Previous Nutrition Diagnosis:   [ ] Inadequate Energy Intake [ ]Inadequate Oral Intake [ ] Excessive Energy Intake   [ ] Underweight [x ] Increased Nutrient Needs [ ] Overweight/Obesity   [ ] Altered GI Function [ ] Unintended Weight Loss [ ] Food & Nutrition Related Knowledge Deficit [x ] Malnutrition     Nutrition Diagnosis is [x ] ongoing  [ ] resolved [ ] not applicable     New Nutrition Diagnosis: [x ] swallow difficulties related to motor causes as evidenced by dysphagia puree moderately thick diet per speech      Interventions:   Recommend  [x ] Change Diet To: puree moderately thick with ensure enlive BID and ensure pudding daily   [ ] Nutrition Supplement  [ ] Nutrition Support  [x ] Other: if continuing full fluids recommend ensure enlive and ensure pudding supplements , follow weights    Monitoring and Evaluation:   [ x] PO intake [ x ] Tolerance to diet prescription [ x ] weights [ x ] labs[ x ] follow up per protocol  [ ] other:

## 2022-01-03 NOTE — CONSULT NOTE ADULT - SUBJECTIVE AND OBJECTIVE BOX
HPI:  Chief Complaint: The patient is a 89y Male complaining of shortness of breath PMHx of Parkinson's Disease (on no medications), CAD (s/p stent), Bladder cancer (s/p tumor resection) sent from [facility] for pna, lethargy, fevers.  pt currently on ABx cefepime, doxy, flagyl for sacral ulcer and colitis, has xr showing bl pna.     PAST MEDICAL HISTORY:  Bladder cancer     Parkinsons     Shoulder fracture     Stented coronary artery over 15 years ago as per patient.     PAST SURGICAL HISTORY:  S/P cardiac catheterization.       patient with infiltrate on cxr and cough and fever   and poor po intake    do iv abx ,   ID eval    hydrate   dvt and gi prophylax  prognosis is poor    (29 Dec 2021 19:11)      PAST MEDICAL & SURGICAL HISTORY:  Shoulder fracture    Bladder cancer    Stented coronary artery  over 15 years ago as per patient    Parkinsons    S/P cardiac catheterization    REVIEW OF SYSTEMS  Patient is unable to provide any information/ROS  due to baseline mental status    MEDICATIONS  (STANDING):  aspirin  chewable 81 milliGRAM(s) Oral daily  dexAMETHasone     Tablet 6 milliGRAM(s) Oral daily  dextrose 5% with potassium chloride 20 mEq/L 1000 milliLiter(s) (75 mL/Hr) IV Continuous <Continuous>  lactobacillus acidophilus 1 Tablet(s) Oral two times a day  multivitamin 1 Tablet(s) Oral daily  piperacillin/tazobactam IVPB.. 3.375 Gram(s) IV Intermittent every 12 hours  remdesivir  IVPB   IV Intermittent   remdesivir  IVPB 100 milliGRAM(s) IV Intermittent every 24 hours  tamsulosin 0.4 milliGRAM(s) Oral at bedtime    MEDICATIONS  (PRN):  acetaminophen     Tablet .. 650 milliGRAM(s) Oral every 6 hours PRN Temp greater or equal to 38C (100.4F), Mild Pain (1 - 3)  guaiFENesin Oral Liquid (Sugar-Free) 200 milliGRAM(s) Oral every 6 hours PRN Cough      Allergies    clindamycin (Unknown)  Levaquin (Unknown)    Intolerances    SOCIAL HISTORY:      FAMILY HISTORY:  FH: HTN (hypertension)    Vital Signs Last 24 Hrs  T(C): 36.8 (03 Jan 2022 12:16), Max: 38.2 (02 Jan 2022 20:16)  T(F): 98.3 (03 Jan 2022 12:16), Max: 100.7 (02 Jan 2022 20:16)  HR: 106 (03 Jan 2022 12:16) (93 - 107)  BP: 118/74 (03 Jan 2022 12:16) (118/74 - 131/72)  BP(mean): --  RR: 18 (03 Jan 2022 12:16) (18 - 18)  SpO2: 91% (03 Jan 2022 12:16) (90% - 92%)    Total Care Sport/ Versa Care P500 bed                            9.8    6.46  )-----------( 143      ( 03 Jan 2022 09:03 )             29.8     01-03    155<H>  |  125<H>  |  70<H>  ----------------------------<  140<H>  2.8<LL>   |  21<L>  |  3.50<H>    Ca    8.6      03 Jan 2022 09:03      Auto Neutrophil #: 5.36 K/uL (01-03-22 @ 09:03)      Respiratory: CTA    Gastrointestinal soft NT/ND (+)BS    Neurology    nonverbal, Can not follow commands    Musculoskeletal/Vascular:    no deformities/ contractures      Skin:    frail,  ecchymosis w/o hematoma

## 2022-01-03 NOTE — PROGRESS NOTE ADULT - ASSESSMENT
89 yo M w/ PMHx of Parkinson's Disease (on no medications), CAD (s/p stent), Bladder cancer    covid  ckd  htn  OP  OA  eval for Dysphagia  hypoxemia  ? PNA     FEVER overnight - consider repeat LABS - ID f/u    on RA  procalcitonin - crp - noted  VS noted  labs reviewed  on PO Liquid diet  ID f/u noted    SLP eval noted  Parkinson's hx - risk for aspiration  Covid - hypoxemia - remdesivir and decadron  monitor VS and HD and Sat  serial D dimer  DVT p  assist with needs - ADL  isolation precs  assist with needs - ADL  GOC - DNR  prognosis guarded  old records reviewed

## 2022-01-03 NOTE — CONSULT NOTE ADULT - ASSESSMENT
Patient presents with unstageable pressure injury 15 x 10 no drainage, tissue black/dark purple at intergluteal fold, sacral region, bilateral buttocks Patient non-verbal, using FACES pain scale while turning patient pain 6/10 of 10/10 scale periwound with erythema  Recommend: TRIAD BID and PRN      Continue  Nutrition (as tolerated)  Continue  Offloading   Continue Pericare  Apply cair boots at all times while in bed.   Provide skin checks and foot placement q8h.  Care as per medicine will follow w/ you  Findings and recommendations discussed with CHANDLER Dover   Thank you for this consult  Malena Singh NP, [phone] Patient presents with unstageable pressure injury 15 x 10 no drainage, tissue black/dark purple at intergluteal fold, sacral region, bilateral buttocks Patient non-verbal, using FACES pain scale while turning patient pain 6/10 of 10/10 scale periwound with erythema  Recommend: TRIAD BID and PRN    Continue  Nutrition (as tolerated)  Continue  Offloading   Continue Pericare  Apply cair boots at all times while in bed.   Provide skin checks and foot placement q8h.  Care as per medicine will follow w/ you  Findings and recommendations discussed with CHANDLER Dover   Thank you for this consult  Malena Singh NP, [phone]

## 2022-01-03 NOTE — PROGRESS NOTE ADULT - ASSESSMENT
89yo M w/ PMHx of Parkinson's Disease (on no medications), CAD (s/p stent), Bladder cancer (s/p tumor resection) sent from [facility] for pna, lethargy, fevers.  pt currently on ABx cefepime, doxy, flagyl for sacral ulcer and colitis, has xr showing bl pna. sent to ed  pt does not provide history    COVID-19 PNA +/- superimposed bacterial PNA  AHRF on O2  Fevers  Leukopenia  COVID-19-high risk for decompensation EARLY INFLAMMATORY PHASE (7-14 days post symptom onset)   -c/w remdesivir x5 day course - complete course today 1/3  -c/w steroids x10 day course  -infectious w/u negative to date  -imaging reviewed--CXR basilar consolidations  -trend biomarkers  -trend temps/WBC  --fevers noted -- afebrile overnight- likely course of disease, c/w monitoring  -maintain aspiration precautions  -isolation precautions per infection control policies  -continue pip-tazo for possible superimposed bacterial PNA---x7 day course pending clinical improvement    Colitis/sacral ulcer  -on zosyn as above, no need to c/w abx from NH      Maico Gomez M.D.  [facility], Division of Infectious Diseases  [phone]  After 5pm on weekdays and all day on weekends - please call [phone]

## 2022-01-03 NOTE — PROGRESS NOTE ADULT - ASSESSMENT
Chief Complaint: shortness of breath.    · Chief Complaint: The patient is a 89y Male complaining of shortness of breath.  · HPI Objective Statement: 89yo M w/ PMHx of Parkinson's Disease (on no medications), CAD (s/p stent), Bladder cancer (s/p tumor resection) sent from [facility] for pna, lethargy, fevers.  pt currently on ABx cefepime, doxy, flagyl for sacral ulcer and colitis, has xr showing bl pna. sent to ed  pt does not provide history      PAST MEDICAL/SURGICAL/FAMILY/SOCIAL HISTORY:    Past Medical, Past Surgical, and Family History:  PAST MEDICAL HISTORY:  Bladder cancer     Parkinsons     Shoulder fracture     Stented coronary artery over 15 years ago as per patient.     PAST SURGICAL HISTORY:  S/P cardiac catheterization.       patient with infiltrate on cxr and cough and fever   and poor po intake    do iv abx ,   ID eval    hydrate   dvt and gi prophylax  prognosis is poor     12.30.21   weak    covid + , ID eval , iv abx ,   d/w wife    agrees to palliative care and dnr and dni    on 12.31 21 as per ID:    COVID-19 PNA +/- superimposed bacterial PNA  AHRF on O2  Fevers  COVID-19-high risk for decompensation EARLY INFLAMMATORY PHASE (7-14 days post symptom onset)   Patient is not a candidate for mAB at this time given that he was hypoxemic on admission and currently on O2 therapy--d/w Dr. Anderson as well.  -would start remdesivir x5 days until 1/3/2022  -would start steroids/AC on this patient  -infectious w/u pending--will f/u  -imaging reviewed CXR basilar consolidations  -trend biomarkers  -trend temps/WBC  -maintain aspiration precautions  -isolation precautions per infection control policies  -Would c/w zosyn for possible superimposed bacterial PNA---duration pending clinical improvement    Colitis/sacral ulcer  -on zosyn as above, no need to c/w abx from NH    Infectious Diseases will continue to follow. Please call with any questions.     on 1.2.2022 plan as per ID:  COVID-19 PNA +/- superimposed bacterial PNA  AHRF on O2  Fevers  Leukopenia  COVID-19-high risk for decompensation EARLY INFLAMMATORY PHASE (7-14 days post symptom onset)   -c/w remdesivir x5 day course  -c/w steroids x10 day course  -infectious w/u negative to date  -imaging reviewed--CXR basilar consolidations  -trend biomarkers  -trend temps/WBC  --fevers noted, likely course of disease, will c/w monitoring  -maintain aspiration precautions  -isolation precautions per infection control policies  -Would c/w zosyn for possible superimposed bacterial PNA---x5-7 day course pending clinical improvement    Colitis/sacral ulcer  -on zosyn as above, no need to c/w abx from NH    Infectious Diseases will continue to follow. Please call with any questions.

## 2022-01-03 NOTE — PHARMACOTHERAPY INTERVENTION NOTE - COMMENTS
Patient is a 89 year old male with COVID-19 ordered for Lovenox 30mg Daily for thromboprophylaxis. As per [facility]'s COVID-19 Anticoagulation guidelines, patients with a dimer<2x ULN with a BMI <30 and CrCl < 15ml/min should be ordered for heparin 5000 units sq q8 hours. Patient has no resulted dimer but renal function declined to a CrCl < 15 today. Discussed therapy with Dr. Dover and suggested switching anticoagulation to heparin 5000 units sq q8hrs, MD agreed. Order entered

## 2022-01-03 NOTE — PROVIDER CONTACT NOTE (OTHER) - SITUATION
Noted patient had two dark bowel movements, on wound care assessment, WCRNMalena, agreed to possible GI Bleed

## 2022-01-03 NOTE — CONSULT NOTE ADULT - SUBJECTIVE AND OBJECTIVE BOX
NEPHROLOGY CONSULTATION    CHIEF COMPLAINT:  SMITH/CKD    HPI:  Sent to ER with dyspnea and dx with covid.  Renal function worse than baseline with electrolyte abnormalities.  Saturating 91-92% on RA.       PAST MEDICAL & SURGICAL HISTORY:  Shoulder fracture  Bladder cancer  Stented coronary artery  over 15 years ago as per patient  Parkinson's disease  S/P cardiac catheterization      SOCIAL HISTORY:  NA    FAMILY HISTORY:  NA      MEDICATIONS  (STANDING):  aspirin  chewable 81 milliGRAM(s) Oral daily  dexAMETHasone     Tablet 6 milliGRAM(s) Oral daily  enoxaparin Injectable 30 milliGRAM(s) SubCutaneous daily  lactobacillus acidophilus 1 Tablet(s) Oral two times a day  multivitamin 1 Tablet(s) Oral daily  piperacillin/tazobactam IVPB.. 3.375 Gram(s) IV Intermittent every 12 hours  remdesivir  IVPB   IV Intermittent   remdesivir  IVPB 100 milliGRAM(s) IV Intermittent every 24 hours  sodium chloride 0.9% with potassium chloride 20 mEq/L 1000 milliLiter(s) (100 mL/Hr) IV Continuous <Continuous>  tamsulosin 0.4 milliGRAM(s) Oral at bedtime      PHYSICAL EXAMINATION:  T(F): 98.3 (01-03-22 @ 12:16)  HR: 106 (01-03-22 @ 12:16)  BP: 118/74 (01-03-22 @ 12:16)  RR: 18 (01-03-22 @ 12:16)  SpO2: 91% (01-03-22 @ 12:16)      LABS:                        9.8    6.46  )-----------( 143      ( 03 Jan 2022 09:03 )             29.8     01-03    155<H>  |  125<H>  |  70<H>  ----------------------------<  140<H>  2.8<LL>   |  21<L>  |  3.50<H>    Ca    8.6      03 Jan 2022 09:03        RADIOLOGY:  Chest X-Ray personally reviewed and shows subtle left retrocardiac infiltrate      ASSESSMENT:  1.  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  Dehydration and hypokalemia    PLAN:  Change IVF to D5W + 20 meq/L KCl   BMP in AM  Bladder scan, mike PRN

## 2022-01-03 NOTE — PROGRESS NOTE ADULT - SUBJECTIVE AND OBJECTIVE BOX
Date/Time Patient Seen:  		  Referring MD:   Data Reviewed	       Patient is a 89y old  Male who presents with a chief complaint of fever    weakness (02 Jan 2022 16:18)      Subjective/HPI     PAST MEDICAL & SURGICAL HISTORY:  Shoulder fracture    Bladder cancer    Stented coronary artery  over 15 years ago as per patient    Parkinsons    S/P cardiac catheterization          Medication list         MEDICATIONS  (STANDING):  aspirin  chewable 81 milliGRAM(s) Oral daily  dexAMETHasone     Tablet 6 milliGRAM(s) Oral daily  enoxaparin Injectable 30 milliGRAM(s) SubCutaneous daily  lactobacillus acidophilus 1 Tablet(s) Oral two times a day  multivitamin 1 Tablet(s) Oral daily  piperacillin/tazobactam IVPB.. 3.375 Gram(s) IV Intermittent every 12 hours  remdesivir  IVPB 100 milliGRAM(s) IV Intermittent every 24 hours  remdesivir  IVPB   IV Intermittent   tamsulosin 0.4 milliGRAM(s) Oral at bedtime    MEDICATIONS  (PRN):  acetaminophen     Tablet .. 650 milliGRAM(s) Oral every 6 hours PRN Temp greater or equal to 38C (100.4F), Mild Pain (1 - 3)  guaiFENesin Oral Liquid (Sugar-Free) 200 milliGRAM(s) Oral every 6 hours PRN Cough         Vitals log        ICU Vital Signs Last 24 Hrs  T(C): 36.7 (03 Jan 2022 05:01), Max: 38.7 (02 Jan 2022 12:08)  T(F): 98.1 (03 Jan 2022 05:01), Max: 101.7 (02 Jan 2022 12:08)  HR: 107 (03 Jan 2022 05:01) (88 - 107)  BP: 126/75 (03 Jan 2022 05:01) (119/67 - 131/72)  BP(mean): --  ABP: --  ABP(mean): --  RR: 18 (03 Jan 2022 05:01) (18 - 18)  SpO2: 92% (03 Jan 2022 05:01) (90% - 92%)           Input and Output:  I&O's Detail    01 Jan 2022 07:01  -  02 Jan 2022 07:00  --------------------------------------------------------  IN:    IV PiggyBack: 50 mL    Oral Fluid: 360 mL  Total IN: 410 mL    OUT:    Incontinent per Condom Catheter (mL): 200 mL    Voided (mL): 700 mL  Total OUT: 900 mL    Total NET: -490 mL      02 Jan 2022 07:01  -  03 Jan 2022 05:18  --------------------------------------------------------  IN:  Total IN: 0 mL    OUT:    Incontinent per Condom Catheter (mL): 300 mL    Voided (mL): 300 mL  Total OUT: 600 mL    Total NET: -600 mL          Lab Data                  Review of Systems	      Objective     Physical Examination  heart s1s2  lung dec BS  abd soft  head nc        Pertinent Lab findings & Imaging      Lalo:  NO   Adequate UO     I&O's Detail    01 Jan 2022 07:01  -  02 Jan 2022 07:00  --------------------------------------------------------  IN:    IV PiggyBack: 50 mL    Oral Fluid: 360 mL  Total IN: 410 mL    OUT:    Incontinent per Condom Catheter (mL): 200 mL    Voided (mL): 700 mL  Total OUT: 900 mL    Total NET: -490 mL      02 Jan 2022 07:01  -  03 Jan 2022 05:18  --------------------------------------------------------  IN:  Total IN: 0 mL    OUT:    Incontinent per Condom Catheter (mL): 300 mL    Voided (mL): 300 mL  Total OUT: 600 mL    Total NET: -600 mL               Discussed with:     Cultures:	        Radiology

## 2022-01-03 NOTE — PROGRESS NOTE ADULT - SUBJECTIVE AND OBJECTIVE BOX
[facility] DIVISION of INFECTIOUS DISEASE  Theodore Carmona MD PhD, Liliane Perry MD, Hina Triplett MD, Maico Gomez MD, Xavier Stoddard MD  and providing coverage with Clari Roberto MD and Vanesa Vargas MD  Providing Infectious Disease Consultations at [facility], [facility], [facility]'s      Office# [phone] to schedule follow up appointments  Answering Service for urgent calls or New Consults [phone]    Infectious Diseases Progress Note:    HAYDER MEYER is a 89y year old Male patient    COVID-19 Patient  Remains on RA, afebrile since last night.   Notes reviewed  No concerning events overnight.   Allergies: clindamycin (Unknown)  Levaquin (Unknown)    ANTIBIOTICS/RELEVANT:  Antimicrobials   piperacillin/tazobactam IVPB.. 3.375 Gram(s) IV Intermittent every 12 hours  remdesivir  IVPB 100 milliGRAM(s) IV Intermittent every 24 hours  remdesivir  IVPB   IV Intermittent     Immunologic:   Other Meds: acetaminophen     Tablet .. 650 milliGRAM(s) Oral every 6 hours PRN  aspirin  chewable 81 milliGRAM(s) Oral daily  dexAMETHasone     Tablet 6 milliGRAM(s) Oral daily  enoxaparin Injectable 30 milliGRAM(s) SubCutaneous daily  guaiFENesin Oral Liquid (Sugar-Free) 200 milliGRAM(s) Oral every 6 hours PRN  lactobacillus acidophilus 1 Tablet(s) Oral two times a day  multivitamin 1 Tablet(s) Oral daily  potassium chloride  10 mEq/100 mL IVPB 10 milliEquivalent(s) IV Intermittent every 1 hour  sodium chloride 0.9% with potassium chloride 20 mEq/L 1000 milliLiter(s) IV Continuous <Continuous>  tamsulosin 0.4 milliGRAM(s) Oral at bedtime    Objective:  Vital Signs Last 24 Hrs  T(F): 98.1 (03 Jan 2022 05:01), Max: 101.7 (02 Jan 2022 12:08)  HR: 107 (03 Jan 2022 05:01) (88 - 107)  BP: 126/75 (03 Jan 2022 05:01) (119/67 - 131/72)  RR: 18 (03 Jan 2022 05:01) (18 - 18)  SpO2: 92% (03 Jan 2022 05:01) (90% - 92%)  PHYSICAL EXAM:  HEENT: NC/AT, anicteric, supple  Respiratory: no acc muscle use, breathing comfortably  Cardiovascular: S1 S2 present, tachycardia  Gastrointestinal: normal appearing, nondistended  Extremities: no cyanosis    LABS:                        9.8    6.46  )-----------( 143      ( 03 Jan 2022 09:03 )             29.8     WBC trend:  6.46 01-03 @ 09:03  2.55 12-30 @ 08:57  2.27 12-29 @ 16:38    01-03    155<H>  |  125<H>  |  70<H>  ----------------------------<  140<H>  2.8<LL>   |  21<L>  |  3.50<H>    Ca    8.6      03 Jan 2022 09:03    Creatinine, Serum: 3.50 mg/dL (01-03-22 @ 09:03)  Creatinine, Serum: 2.30 mg/dL (12-30-21 @ 08:57)  Creatinine, Serum: 2.60 mg/dL (12-29-21 @ 16:38)    Auto Neutrophil #: 5.36 K/uL (01-03-22 @ 09:03)  Auto Neutrophil #: 1.55 K/uL (12-30-21 @ 08:57)  Auto Neutrophil #: 1.52 K/uL (12-29-21 @ 16:38)    Auto Lymphocyte #: 0.26 K/uL (01-03-22 @ 09:03)  Auto Lymphocyte #: 0.52 K/uL (12-30-21 @ 08:57)  Auto Lymphocyte #: 0.57 K/uL (12-29-21 @ 16:38)    Procalcitonin, Serum: 0.12 ng/mL (12-30-21 @ 16:47)    Sedimentation Rate, Erythrocyte: 42 mm/hr (12-29-21 @ 16:38)    Lactate, Blood: 1.6 mmol/L (12-29-21 @ 16:38)    Prothrombin Time, Plasma: 12.0 sec (12-29-21 @ 16:38)    Activated Partial Thromboplastin Time: 32.1 sec (12-29-21 @ 16:38)    C-Reactive Protein, Serum: 27 mg/L (12-31-21 @ 01:39)    MICROBIOLOGY: reviewed  Culture - Blood (collected 12-30-21 @ 00:43)  Source: .Blood Blood-Peripheral  Preliminary Report (12-31-21 @ 01:02):    No growth to date.    Culture - Blood (collected 12-30-21 @ 00:43)  Source: .Blood Blood-Peripheral  Preliminary Report (12-31-21 @ 01:02):    No growth to date.    Culture - Urine (collected 12-30-21 @ 00:38)  Source: Clean Catch Clean Catch (Midstream)  Final Report (12-30-21 @ 22:38):    No growth    RADIOLOGY & ADDITIONAL STUDIES: reviewed

## 2022-01-04 LAB
ANION GAP SERPL CALC-SCNC: 13 MMOL/L — SIGNIFICANT CHANGE UP (ref 5–17)
BUN SERPL-MCNC: 70 MG/DL — HIGH (ref 7–23)
CALCIUM SERPL-MCNC: 8.9 MG/DL — SIGNIFICANT CHANGE UP (ref 8.5–10.1)
CHLORIDE SERPL-SCNC: 122 MMOL/L — HIGH (ref 96–108)
CO2 SERPL-SCNC: 20 MMOL/L — LOW (ref 22–31)
CREAT SERPL-MCNC: 3.4 MG/DL — HIGH (ref 0.5–1.3)
CULTURE RESULTS: SIGNIFICANT CHANGE UP
CULTURE RESULTS: SIGNIFICANT CHANGE UP
GLUCOSE SERPL-MCNC: 117 MG/DL — HIGH (ref 70–99)
OB PNL STL: NEGATIVE — SIGNIFICANT CHANGE UP
POTASSIUM SERPL-MCNC: 3.6 MMOL/L — SIGNIFICANT CHANGE UP (ref 3.5–5.3)
POTASSIUM SERPL-SCNC: 3.6 MMOL/L — SIGNIFICANT CHANGE UP (ref 3.5–5.3)
SODIUM SERPL-SCNC: 155 MMOL/L — HIGH (ref 135–145)
SPECIMEN SOURCE: SIGNIFICANT CHANGE UP
SPECIMEN SOURCE: SIGNIFICANT CHANGE UP

## 2022-01-04 RX ORDER — SODIUM CHLORIDE 9 MG/ML
1000 INJECTION, SOLUTION INTRAVENOUS
Refills: 0 | Status: DISCONTINUED | OUTPATIENT
Start: 2022-01-04 | End: 2022-01-05

## 2022-01-04 RX ADMIN — HEPARIN SODIUM 5000 UNIT(S): 5000 INJECTION INTRAVENOUS; SUBCUTANEOUS at 12:01

## 2022-01-04 RX ADMIN — Medication 81 MILLIGRAM(S): at 12:01

## 2022-01-04 RX ADMIN — TAMSULOSIN HYDROCHLORIDE 0.4 MILLIGRAM(S): 0.4 CAPSULE ORAL at 22:06

## 2022-01-04 RX ADMIN — PIPERACILLIN AND TAZOBACTAM 25 GRAM(S): 4; .5 INJECTION, POWDER, LYOPHILIZED, FOR SOLUTION INTRAVENOUS at 17:28

## 2022-01-04 RX ADMIN — DEXTROSE MONOHYDRATE, SODIUM CHLORIDE, AND POTASSIUM CHLORIDE 75 MILLILITER(S): 50; .745; 4.5 INJECTION, SOLUTION INTRAVENOUS at 05:21

## 2022-01-04 RX ADMIN — PIPERACILLIN AND TAZOBACTAM 25 GRAM(S): 4; .5 INJECTION, POWDER, LYOPHILIZED, FOR SOLUTION INTRAVENOUS at 05:25

## 2022-01-04 RX ADMIN — Medication 1 TABLET(S): at 05:22

## 2022-01-04 RX ADMIN — HEPARIN SODIUM 5000 UNIT(S): 5000 INJECTION INTRAVENOUS; SUBCUTANEOUS at 22:06

## 2022-01-04 RX ADMIN — SODIUM CHLORIDE 50 MILLILITER(S): 9 INJECTION, SOLUTION INTRAVENOUS at 15:34

## 2022-01-04 RX ADMIN — Medication 6 MILLIGRAM(S): at 05:22

## 2022-01-04 RX ADMIN — Medication 1 TABLET(S): at 17:28

## 2022-01-04 RX ADMIN — Medication 1 TABLET(S): at 12:01

## 2022-01-04 NOTE — PROGRESS NOTE ADULT - SUBJECTIVE AND OBJECTIVE BOX
[facility] DIVISION of INFECTIOUS DISEASE  Theodore Carmona MD PhD, Liliane Perry MD, Hina Triplett MD, Maico Gomez MD, Xavier Stoddard MD  and providing coverage with Clari Roberto MD and Vanesa Vargas MD  Providing Infectious Disease Consultations at [facility], [facility]'s      Office# [phone] to schedule follow up appointments  Answering Service for urgent calls or New Consults [phone]    Infectious Diseases Progress Note:    HAYDER MEYER is a 89y year old Male patient    COVID-19 Patient  Notes reviewed  No concerning events overnight.   Allergies: clindamycin (Unknown)  Levaquin (Unknown)    ANTIBIOTICS/RELEVANT:  Antimicrobials   piperacillin/tazobactam IVPB.. 3.375 Gram(s) IV Intermittent every 12 hours    Immunologic:   Other Meds: acetaminophen     Tablet .. 650 milliGRAM(s) Oral every 6 hours PRN  aspirin  chewable 81 milliGRAM(s) Oral daily  dexAMETHasone     Tablet 6 milliGRAM(s) Oral daily  dextrose 5% with potassium chloride 20 mEq/L 1000 milliLiter(s) IV Continuous <Continuous>  guaiFENesin Oral Liquid (Sugar-Free) 200 milliGRAM(s) Oral every 6 hours PRN  heparin   Injectable 5000 Unit(s) SubCutaneous every 8 hours  lactobacillus acidophilus 1 Tablet(s) Oral two times a day  multivitamin 1 Tablet(s) Oral daily  tamsulosin 0.4 milliGRAM(s) Oral at bedtime    Objective:  Vital Signs Last 24 Hrs  T(F): 97.7 (04 Jan 2022 04:45), Max: 98.3 (03 Jan 2022 12:16)  HR: 94 (04 Jan 2022 04:45) (89 - 106)  BP: 146/78 (04 Jan 2022 04:45) (118/74 - 146/78)  RR: 17 (04 Jan 2022 04:45) (17 - 18)  SpO2: 92% (04 Jan 2022 04:45) (91% - 92%)  PHYSICAL EXAM:  HEENT: NC/AT, anicteric, supple  Respiratory: no acc muscle use, breathing comfortably  Cardiovascular: S1 S2 present, normal rate  Gastrointestinal: normal appearing, nondistended  Extremities: no edema, no cyanosis    LABS:                        9.8    6.46  )-----------( 143      ( 03 Jan 2022 09:03 )             29.8     WBC trend:  6.46 01-03 @ 09:03  2.55 12-30 @ 08:57  2.27 12-29 @ 16:38    01-04    155<H>  |  122<H>  |  70<H>  ----------------------------<  117<H>  3.6   |  20<L>  |  3.40<H>    Ca    8.9      04 Jan 2022 08:39    Creatinine, Serum: 3.40 mg/dL (01-04-22 @ 08:39)  Creatinine, Serum: 3.50 mg/dL (01-03-22 @ 09:03)  Creatinine, Serum: 2.30 mg/dL (12-30-21 @ 08:57)  Creatinine, Serum: 2.60 mg/dL (12-29-21 @ 16:38)    Auto Neutrophil #: 5.36 K/uL (01-03-22 @ 09:03)  Auto Neutrophil #: 1.55 K/uL (12-30-21 @ 08:57)  Auto Neutrophil #: 1.52 K/uL (12-29-21 @ 16:38)    Auto Lymphocyte #: 0.26 K/uL (01-03-22 @ 09:03)  Auto Lymphocyte #: 0.52 K/uL (12-30-21 @ 08:57)  Auto Lymphocyte #: 0.57 K/uL (12-29-21 @ 16:38)    Procalcitonin, Serum: 0.12 ng/mL (12-30-21 @ 16:47)    Sedimentation Rate, Erythrocyte: 42 mm/hr (12-29-21 @ 16:38)    Lactate, Blood: 1.6 mmol/L (12-29-21 @ 16:38)    Prothrombin Time, Plasma: 12.0 sec (12-29-21 @ 16:38)    Activated Partial Thromboplastin Time: 32.1 sec (12-29-21 @ 16:38)    C-Reactive Protein, Serum: 27 mg/L (12-31-21 @ 01:39)    MICROBIOLOGY: reviewed  Culture - Blood (collected 12-30-21 @ 00:43)  Source: .Blood Blood-Peripheral  Final Report (01-04-22 @ 01:00):    No Growth Final    Culture - Blood (collected 12-30-21 @ 00:43)  Source: .Blood Blood-Peripheral  Final Report (01-04-22 @ 01:00):    No Growth Final    Culture - Urine (collected 12-30-21 @ 00:38)  Source: Clean Catch Clean Catch (Midstream)  Final Report (12-30-21 @ 22:38):    No growth    RADIOLOGY & ADDITIONAL STUDIES: reviewed

## 2022-01-04 NOTE — PROGRESS NOTE ADULT - ASSESSMENT
89 yo M w/ PMHx of Parkinson's Disease (on no medications), CAD (s/p stent), Bladder cancer    covid  ckd  htn  OP  OA  eval for Dysphagia  hypoxemia  ? PNA     heme occult neg - on RA - VS noted - am Na pending -     procalcitonin - crp - noted  labs reviewed  PO intake  ID f/u noted    SLP eval noted  Parkinson's hx - risk for aspiration  Covid - hypoxemia - remdesivir and decadron  monitor VS and HD and Sat  serial D dimer  DVT p  assist with needs - ADL  isolation precs  assist with needs - ADL  GOC - DNR  prognosis guarded  old records reviewed

## 2022-01-04 NOTE — PROGRESS NOTE ADULT - SUBJECTIVE AND OBJECTIVE BOX
PCP  Subjective:   in bed , weak , mostly asleep    Objective:   Vital Signs Last 24 Hrs  T(C): 37.3 (22 @ 21:04), Max: 37.3 (22 @ 21:04)  T(F): 99.1 (22 @ 21:04), Max: 99.1 (22 @ 21:04)  HR: 94 (22 @ 21:04) (93 - 94)  BP: 102/59 (22 @ 21:04) (102/59 - 158/76)  BP(mean): --  RR: 18 (22 @ 21:04) (17 - 18)  SpO2: 91% (22 @ 21:04) (91% - 93%)  Daily     Daily Weight in k.5 (2022 04:45)  GENERAL:  wdwn male ,weak ., awake ,  EYES: eomi  NECK: supple  CHEST/LUNG: rales , bilateral   HEART: s1 s2 regular  ABDOMEN:  soft nt + bs  EXTREMITIES:  no edema  SKIN:  warm , pale , weak   CNS:  awake , alert ,   non focal,  weak ,         Allergies: Allergies    clindamycin (Unknown)  Levaquin (Unknown)    Intolerances        Home Medications:  acetaminophen 325 mg oral tablet: 2 tab(s) orally every 6 hours, As needed, for pain (30 Dec 2021 10:54)  Acidophilus with Pectin oral capsule: 1 cap(s) orally 2 times a day (30 Dec 2021 10:54)  aspirin 81 mg oral tablet, chewable: 1 tab(s) orally once a day (30 Dec 2021 10:54)  cefpodoxime 200 mg oral tablet: 1 tab(s) orally every 12 hours for 7 days    *Start 2021 (30 Dec 2021 10:54)  Dulcolax Laxative 10 mg rectal suppository: 1 suppository(ies) rectal once a day, As Needed if no bowel movement results after M.O.M. (30 Dec 2021 10:54)  ferrous sulfate 325 mg (65 mg elemental iron) oral tablet: 1 tab(s) orally 2 times a day (30 Dec 2021 10:54)  Fleet Enema 19 g-7 g rectal enema: 133 milliliter(s) rectal once a day, As Needed if no bowel movement results for 4-8 hours after Dulcolax sup (30 Dec 2021 10:54)  Icy Hot Extra Strength 5% topical pad: Apply topically once a day to lower back (30 Dec 2021 10:54)  Milk of Magnesia 8% oral suspension: 30 milliliter(s) orally once a day, As Needed if no bowel movement in 6 consecutive shifts (30 Dec 2021 10:54)  mirtazapine 15 mg oral tablet: 1 tab(s) orally once a day (at bedtime) (30 Dec 2021 10:54)  oxyCODONE 5 mg oral tablet: 1 tab(s) orally every 8 hours, As Needed pain (30 Dec 2021 10:54)  tamsulosin 0.4 mg oral capsule: 1 cap(s) orally once a day (at bedtime) (30 Dec 2021 10:54)    Medications:   acetaminophen     Tablet .. 650 milliGRAM(s) Oral every 6 hours PRN  aspirin  chewable 81 milliGRAM(s) Oral daily  dexAMETHasone     Tablet 6 milliGRAM(s) Oral daily  dextrose 5%. 1000 milliLiter(s) IV Continuous <Continuous>  guaiFENesin Oral Liquid (Sugar-Free) 200 milliGRAM(s) Oral every 6 hours PRN  heparin   Injectable 5000 Unit(s) SubCutaneous every 8 hours  lactobacillus acidophilus 1 Tablet(s) Oral two times a day  multivitamin 1 Tablet(s) Oral daily  piperacillin/tazobactam IVPB.. 3.375 Gram(s) IV Intermittent every 12 hours  tamsulosin 0.4 milliGRAM(s) Oral at bedtime      LABS:                        9.8    6.46  )-----------( 143      ( 2022 09:03 )             29.8     01-04    155<H>  |  122<H>  |  70<H>  ----------------------------<  117<H>  3.6   |  20<L>  |  3.40<H>    Ca    8.9      2022 08:39              CAPILLARY BLOOD GLUCOSE              RECENT CULTURES:  Culture Results:   No Growth Final ( @ 00:43)  Culture Results:   No Growth Final ( @ 00:43)  Culture Results:   No growth ( @ 00:38)        Culture - Blood (collected 21 @ 00:43)  Source: .Blood Blood-Peripheral  Final Report (22 @ 01:00):    No Growth Final    Culture - Blood (collected 21 @ 00:43)  Source: .Blood Blood-Peripheral  Final Report (22 @ 01:00):    No Growth Final    Culture - Urine (collected 21 @ 00:38)  Source: Clean Catch Clean Catch (Midstream)  Final Report (21 @ 22:38):    No growth

## 2022-01-04 NOTE — PROGRESS NOTE ADULT - SUBJECTIVE AND OBJECTIVE BOX
NEPHROLOGY PROGRESS NOTE    CHIEF COMPLAINT:  SMITH/CKD    HPI:  Little change in renal function on IVF.  NO fever or hypotension noted.  Urine output sluggish.  Oxygenates 93% on RA.     EXAM:  T(F): 97.8 (01-04-22 @ 12:46)  HR: 93 (01-04-22 @ 12:46)  BP: 158/76 (01-04-22 @ 12:46)  RR: 17 (01-04-22 @ 12:46)  SpO2: 93% (01-04-22 @ 12:46)             LABS                             9.8    6.46  )-----------( 143      ( 03 Jan 2022 09:03 )             29.8          01-04    155<H>  |  122<H>  |  70<H>  ----------------------------<  117<H>  3.6   |  20<L>  |  3.40<H>    Ca    8.9      04 Jan 2022 08:39      ASSESSMENT:  1.  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  Hypokalemia, resolved  3.  Hypernatremia, same    PLAN:  Change IVF to D5W @ 50 mL/hr  Daily BMP

## 2022-01-04 NOTE — PROGRESS NOTE ADULT - SUBJECTIVE AND OBJECTIVE BOX
Date/Time Patient Seen:  		  Referring MD:   Data Reviewed	       Patient is a 89y old  Male who presents with a chief complaint of fever    weakness (03 Jan 2022 16:01)      Subjective/HPI     PAST MEDICAL & SURGICAL HISTORY:  Shoulder fracture    Bladder cancer    Stented coronary artery  over 15 years ago as per patient    Parkinsons    S/P cardiac catheterization          Medication list         MEDICATIONS  (STANDING):  aspirin  chewable 81 milliGRAM(s) Oral daily  dexAMETHasone     Tablet 6 milliGRAM(s) Oral daily  dextrose 5% with potassium chloride 20 mEq/L 1000 milliLiter(s) (75 mL/Hr) IV Continuous <Continuous>  heparin   Injectable 5000 Unit(s) SubCutaneous every 8 hours  lactobacillus acidophilus 1 Tablet(s) Oral two times a day  multivitamin 1 Tablet(s) Oral daily  piperacillin/tazobactam IVPB.. 3.375 Gram(s) IV Intermittent every 12 hours  tamsulosin 0.4 milliGRAM(s) Oral at bedtime    MEDICATIONS  (PRN):  acetaminophen     Tablet .. 650 milliGRAM(s) Oral every 6 hours PRN Temp greater or equal to 38C (100.4F), Mild Pain (1 - 3)  guaiFENesin Oral Liquid (Sugar-Free) 200 milliGRAM(s) Oral every 6 hours PRN Cough         Vitals log        ICU Vital Signs Last 24 Hrs  T(C): 36.5 (04 Jan 2022 04:45), Max: 36.8 (03 Jan 2022 12:16)  T(F): 97.7 (04 Jan 2022 04:45), Max: 98.3 (03 Jan 2022 12:16)  HR: 94 (04 Jan 2022 04:45) (89 - 106)  BP: 146/78 (04 Jan 2022 04:45) (118/74 - 146/78)  BP(mean): --  ABP: --  ABP(mean): --  RR: 17 (04 Jan 2022 04:45) (17 - 18)  SpO2: 92% (04 Jan 2022 04:45) (91% - 92%)           Input and Output:  I&O's Detail    02 Jan 2022 07:01  -  03 Jan 2022 07:00  --------------------------------------------------------  IN:  Total IN: 0 mL    OUT:    Incontinent per Condom Catheter (mL): 300 mL    Voided (mL): 600 mL  Total OUT: 900 mL    Total NET: -900 mL      03 Jan 2022 07:01  -  04 Jan 2022 05:30  --------------------------------------------------------  IN:    dextrose 5% with potassium chloride 20 mEq/L: 975 mL    IV PiggyBack: 150 mL    IV PiggyBack: 250 mL    Oral Fluid: 660 mL    sodium chloride 0.9% w/ Additives: 300 mL  Total IN: 2335 mL    OUT:    Incontinent per Condom Catheter (mL): 400 mL  Total OUT: 400 mL    Total NET: 1935 mL          Lab Data                        9.8    6.46  )-----------( 143      ( 03 Jan 2022 09:03 )             29.8     01-03    155<H>  |  125<H>  |  70<H>  ----------------------------<  140<H>  2.8<LL>   |  21<L>  |  3.50<H>    Ca    8.6      03 Jan 2022 09:03              Review of Systems	      Objective     Physical Examination  heart s1s2  lung dec BS  abd soft  head nc        Pertinent Lab findings & Imaging      Lalo:  NO   Adequate UO     I&O's Detail    02 Jan 2022 07:01  -  03 Jan 2022 07:00  --------------------------------------------------------  IN:  Total IN: 0 mL    OUT:    Incontinent per Condom Catheter (mL): 300 mL    Voided (mL): 600 mL  Total OUT: 900 mL    Total NET: -900 mL      03 Jan 2022 07:01  -  04 Jan 2022 05:30  --------------------------------------------------------  IN:    dextrose 5% with potassium chloride 20 mEq/L: 975 mL    IV PiggyBack: 150 mL    IV PiggyBack: 250 mL    Oral Fluid: 660 mL    sodium chloride 0.9% w/ Additives: 300 mL  Total IN: 2335 mL    OUT:    Incontinent per Condom Catheter (mL): 400 mL  Total OUT: 400 mL    Total NET: 1935 mL               Discussed with:     Cultures:	        Radiology

## 2022-01-04 NOTE — PROGRESS NOTE ADULT - ASSESSMENT
89yo M w/ PMHx of Parkinson's Disease (on no medications), CAD (s/p stent), Bladder cancer (s/p tumor resection) sent from [facility] for pna, lethargy, fevers.  pt currently on ABx cefepime, doxy, flagyl for sacral ulcer and colitis, has xr showing bl pna. sent to ed  pt does not provide history    COVID-19 PNA +/- superimposed bacterial PNA  AHRF on O2, now on RA  Fevers, resolved   Leukopenia, resolved   COVID-19-high risk for decompensation EARLY INFLAMMATORY PHASE (7-14 days post symptom onset)   -s/p remdesivir x5 day course completed 1/3  -c/w steroids x10 day course  -infectious w/u negative to date  -imaging reviewed--CXR basilar consolidations  -trend biomarkers, temps/WBC  --fevers noted -- afebrile >24h- likely course of disease, c/w monitoring  -maintain aspiration precautions  -isolation precautions per infection control policies  -continue pip-tazo for possible superimposed bacterial PNA - complete 7 day course today 1/4    Colitis/sacral ulcer  -on zosyn as above, no need to c/w abx from NH      Maico Gomez M.D.  [facility], Division of Infectious Diseases  [phone]  After 5pm on weekdays and all day on weekends - please call [phone]

## 2022-01-04 NOTE — PROGRESS NOTE ADULT - ASSESSMENT
Chief Complaint: shortness of breath.    · Chief Complaint: The patient is a 89y Male complaining of shortness of breath.  · HPI Objective Statement: 89yo M w/ PMHx of Parkinson's Disease (on no medications), CAD (s/p stent), Bladder cancer (s/p tumor resection) sent from [facility] for pna, lethargy, fevers.  pt currently on ABx cefepime, doxy, flagyl for sacral ulcer and colitis, has xr showing bl pna. sent to ed  pt does not provide history      PAST MEDICAL/SURGICAL/FAMILY/SOCIAL HISTORY:    Past Medical, Past Surgical, and Family History:  PAST MEDICAL HISTORY:  Bladder cancer     Parkinsons     Shoulder fracture     Stented coronary artery over 15 years ago as per patient.     PAST SURGICAL HISTORY:  S/P cardiac catheterization.       patient with infiltrate on cxr and cough and fever   and poor po intake    do iv abx ,   ID eval    hydrate   dvt and gi prophylax  prognosis is poor     12.30.21   weak    covid + , ID eval , iv abx ,   d/w wife    agrees to palliative care and dnr and dni    on 12.31 21 as per ID:    COVID-19 PNA +/- superimposed bacterial PNA  AHRF on O2  Fevers  COVID-19-high risk for decompensation EARLY INFLAMMATORY PHASE (7-14 days post symptom onset)   Patient is not a candidate for mAB at this time given that he was hypoxemic on admission and currently on O2 therapy--d/w Dr. Anderson as well.  -would start remdesivir x5 days until 1/3/2022  -would start steroids/AC on this patient  -infectious w/u pending--will f/u  -imaging reviewed CXR basilar consolidations  -trend biomarkers  -trend temps/WBC  -maintain aspiration precautions  -isolation precautions per infection control policies  -Would c/w zosyn for possible superimposed bacterial PNA---duration pending clinical improvement    Colitis/sacral ulcer  -on zosyn as above, no need to c/w abx from NH    Infectious Diseases will continue to follow. Please call with any questions.     on 1.2.2022 plan as per ID:  COVID-19 PNA +/- superimposed bacterial PNA  AHRF on O2  Fevers  Leukopenia  COVID-19-high risk for decompensation EARLY INFLAMMATORY PHASE (7-14 days post symptom onset)   -c/w remdesivir x5 day course  -c/w steroids x10 day course  -infectious w/u negative to date  -imaging reviewed--CXR basilar consolidations  -trend biomarkers  -trend temps/WBC  --fevers noted, likely course of disease, will c/w monitoring  -maintain aspiration precautions  -isolation precautions per infection control policies  -Would c/w zosyn for possible superimposed bacterial PNA---x5-7 day course pending clinical improvement    Colitis/sacral ulcer  -on zosyn as above, no need to c/w abx from NH    Infectious Diseases will continue to follow. Please call with any questions.

## 2022-01-05 ENCOUNTER — TRANSCRIPTION ENCOUNTER (OUTPATIENT)
Age: 87
End: 2022-01-05

## 2022-01-05 LAB — SARS-COV-2 RNA SPEC QL NAA+PROBE: DETECTED

## 2022-01-05 RX ORDER — DEXTROSE MONOHYDRATE, SODIUM CHLORIDE, AND POTASSIUM CHLORIDE 50; .745; 4.5 G/1000ML; G/1000ML; G/1000ML
1000 INJECTION, SOLUTION INTRAVENOUS
Refills: 0 | Status: DISCONTINUED | OUTPATIENT
Start: 2022-01-05 | End: 2022-01-08

## 2022-01-05 RX ORDER — OXYCODONE HYDROCHLORIDE 5 MG/1
1 TABLET ORAL
Qty: 0 | Refills: 0 | DISCHARGE

## 2022-01-05 RX ORDER — CEFPODOXIME PROXETIL 100 MG
1 TABLET ORAL
Qty: 0 | Refills: 0 | DISCHARGE

## 2022-01-05 RX ORDER — HEPARIN SODIUM 5000 [USP'U]/ML
5000 INJECTION INTRAVENOUS; SUBCUTANEOUS
Qty: 0 | Refills: 0 | DISCHARGE
Start: 2022-01-05

## 2022-01-05 RX ORDER — FERROUS SULFATE 325(65) MG
1 TABLET ORAL
Qty: 0 | Refills: 0 | DISCHARGE

## 2022-01-05 RX ORDER — LACTOBACILLUS ACIDOPHILUS 100MM CELL
1 CAPSULE ORAL
Qty: 0 | Refills: 0 | DISCHARGE

## 2022-01-05 RX ADMIN — DEXTROSE MONOHYDRATE, SODIUM CHLORIDE, AND POTASSIUM CHLORIDE 80 MILLILITER(S): 50; .745; 4.5 INJECTION, SOLUTION INTRAVENOUS at 18:59

## 2022-01-05 RX ADMIN — HEPARIN SODIUM 5000 UNIT(S): 5000 INJECTION INTRAVENOUS; SUBCUTANEOUS at 20:04

## 2022-01-05 RX ADMIN — Medication 1 TABLET(S): at 11:56

## 2022-01-05 RX ADMIN — Medication 6 MILLIGRAM(S): at 05:53

## 2022-01-05 RX ADMIN — Medication 1 TABLET(S): at 18:51

## 2022-01-05 RX ADMIN — Medication 1 TABLET(S): at 05:53

## 2022-01-05 RX ADMIN — HEPARIN SODIUM 5000 UNIT(S): 5000 INJECTION INTRAVENOUS; SUBCUTANEOUS at 11:56

## 2022-01-05 RX ADMIN — HEPARIN SODIUM 5000 UNIT(S): 5000 INJECTION INTRAVENOUS; SUBCUTANEOUS at 05:53

## 2022-01-05 RX ADMIN — TAMSULOSIN HYDROCHLORIDE 0.4 MILLIGRAM(S): 0.4 CAPSULE ORAL at 21:19

## 2022-01-05 RX ADMIN — PIPERACILLIN AND TAZOBACTAM 25 GRAM(S): 4; .5 INJECTION, POWDER, LYOPHILIZED, FOR SOLUTION INTRAVENOUS at 18:51

## 2022-01-05 RX ADMIN — PIPERACILLIN AND TAZOBACTAM 25 GRAM(S): 4; .5 INJECTION, POWDER, LYOPHILIZED, FOR SOLUTION INTRAVENOUS at 05:00

## 2022-01-05 RX ADMIN — Medication 81 MILLIGRAM(S): at 11:56

## 2022-01-05 NOTE — PROGRESS NOTE ADULT - ASSESSMENT
Chief Complaint: shortness of breath.    · Chief Complaint: The patient is a 89y Male complaining of shortness of breath.  · HPI Objective Statement: 89yo M w/ PMHx of Parkinson's Disease (on no medications), CAD (s/p stent), Bladder cancer (s/p tumor resection) sent from [facility] for pna, lethargy, fevers.  pt currently on ABx cefepime, doxy, flagyl for sacral ulcer and colitis, has xr showing bl pna. sent to ed  pt does not provide history      PAST MEDICAL/SURGICAL/FAMILY/SOCIAL HISTORY:    Past Medical, Past Surgical, and Family History:  PAST MEDICAL HISTORY:  Bladder cancer     Parkinsons     Shoulder fracture     Stented coronary artery over 15 years ago as per patient.     PAST SURGICAL HISTORY:  S/P cardiac catheterization.       patient with infiltrate on cxr and cough and fever   and poor po intake    do iv abx ,   ID eval    hydrate   dvt and gi prophylax  prognosis is poor     12.30.21   weak    covid + , ID eval , iv abx ,   d/w wife    agrees to palliative care and dnr and dni    on 12.31 21 as per ID:    COVID-19 PNA +/- superimposed bacterial PNA  AHRF on O2  Fevers  COVID-19-high risk for decompensation EARLY INFLAMMATORY PHASE (7-14 days post symptom onset)   Patient is not a candidate for mAB at this time given that he was hypoxemic on admission and currently on O2 therapy--d/w Dr. Anderson as well.  -would start remdesivir x5 days until 1/3/2022  -would start steroids/AC on this patient  -infectious w/u pending--will f/u  -imaging reviewed CXR basilar consolidations  -trend biomarkers  -trend temps/WBC  -maintain aspiration precautions  -isolation precautions per infection control policies  -Would c/w zosyn for possible superimposed bacterial PNA---duration pending clinical improvement    Colitis/sacral ulcer  -on zosyn as above, no need to c/w abx from NH    Infectious Diseases will continue to follow. Please call with any questions.     on 1.2.2022 plan as per ID:  COVID-19 PNA +/- superimposed bacterial PNA  AHRF on O2  Fevers  Leukopenia  COVID-19-high risk for decompensation EARLY INFLAMMATORY PHASE (7-14 days post symptom onset)   -c/w remdesivir x5 day course  -c/w steroids x10 day course  -infectious w/u negative to date  -imaging reviewed--CXR basilar consolidations  -trend biomarkers  -trend temps/WBC  --fevers noted, likely course of disease, will c/w monitoring  -maintain aspiration precautions  -isolation precautions per infection control policies  -Would c/w zosyn for possible superimposed bacterial PNA---x5-7 day course pending clinical improvement    Colitis/sacral ulcer  -on zosyn as above, no need to c/w abx from NH    Infectious Diseases will continue to follow. Please call with any questions.

## 2022-01-05 NOTE — DISCHARGE NOTE PROVIDER - DETAILS OF MALNUTRITION DIAGNOSIS/DIAGNOSES
This patient has been assessed with a concern for Malnutrition and was treated during this hospitalization for the following Nutrition diagnosis/diagnoses:     -  12/31/2021: Severe protein-calorie malnutrition   -  12/31/2021: Underweight (BMI < 19)

## 2022-01-05 NOTE — PROGRESS NOTE ADULT - SUBJECTIVE AND OBJECTIVE BOX
PCP  Subjective:   in bed , weak , not eating    Objective:   Vital Signs Last 24 Hrs  T(C): 36.9 (01-05-22 @ 04:59), Max: 37.3 (01-04-22 @ 21:04)  T(F): 98.4 (01-05-22 @ 04:59), Max: 99.1 (01-04-22 @ 21:04)  HR: 93 (01-05-22 @ 04:59) (93 - 94)  BP: 128/55 (01-05-22 @ 04:59) (102/59 - 158/76)  BP(mean): --  RR: 17 (01-05-22 @ 04:59) (17 - 18)  SpO2: 92% (01-05-22 @ 04:59) (91% - 93%)    GENERAL:  wdwn male ,weak ., awake ,  EYES: eomi  NECK: supple  CHEST/LUNG: rales , bilateral   HEART: s1 s2 regular  ABDOMEN:  soft nt + bs  EXTREMITIES:  no edema  SKIN:  warm , pale , weak   CNS:  awake , alert ,   non focal,  weak ,         Allergies: Allergies    clindamycin (Unknown)  Levaquin (Unknown)    Intolerances        Home Medications:  acetaminophen 325 mg oral tablet: 2 tab(s) orally every 6 hours, As needed, for pain (30 Dec 2021 10:54)  Acidophilus with Pectin oral capsule: 1 cap(s) orally 2 times a day (30 Dec 2021 10:54)  aspirin 81 mg oral tablet, chewable: 1 tab(s) orally once a day (30 Dec 2021 10:54)  cefpodoxime 200 mg oral tablet: 1 tab(s) orally every 12 hours for 7 days    *Start 12/28/2021 (30 Dec 2021 10:54)  Dulcolax Laxative 10 mg rectal suppository: 1 suppository(ies) rectal once a day, As Needed if no bowel movement results after M.O.M. (30 Dec 2021 10:54)  ferrous sulfate 325 mg (65 mg elemental iron) oral tablet: 1 tab(s) orally 2 times a day (30 Dec 2021 10:54)  Fleet Enema 19 g-7 g rectal enema: 133 milliliter(s) rectal once a day, As Needed if no bowel movement results for 4-8 hours after Dulcolax sup (30 Dec 2021 10:54)  Icy Hot Extra Strength 5% topical pad: Apply topically once a day to lower back (30 Dec 2021 10:54)  Milk of Magnesia 8% oral suspension: 30 milliliter(s) orally once a day, As Needed if no bowel movement in 6 consecutive shifts (30 Dec 2021 10:54)  mirtazapine 15 mg oral tablet: 1 tab(s) orally once a day (at bedtime) (30 Dec 2021 10:54)  oxyCODONE 5 mg oral tablet: 1 tab(s) orally every 8 hours, As Needed pain (30 Dec 2021 10:54)  tamsulosin 0.4 mg oral capsule: 1 cap(s) orally once a day (at bedtime) (30 Dec 2021 10:54)    Medications:   acetaminophen     Tablet .. 650 milliGRAM(s) Oral every 6 hours PRN  aspirin  chewable 81 milliGRAM(s) Oral daily  dexAMETHasone     Tablet 6 milliGRAM(s) Oral daily  dextrose 5%. 1000 milliLiter(s) IV Continuous <Continuous>  guaiFENesin Oral Liquid (Sugar-Free) 200 milliGRAM(s) Oral every 6 hours PRN  heparin   Injectable 5000 Unit(s) SubCutaneous every 8 hours  lactobacillus acidophilus 1 Tablet(s) Oral two times a day  multivitamin 1 Tablet(s) Oral daily  piperacillin/tazobactam IVPB.. 3.375 Gram(s) IV Intermittent every 12 hours  tamsulosin 0.4 milliGRAM(s) Oral at bedtime      LABS:    01-04    155<H>  |  122<H>  |  70<H>  ----------------------------<  117<H>  3.6   |  20<L>  |  3.40<H>    Ca    8.9      04 Jan 2022 08:39              CAPILLARY BLOOD GLUCOSE              RECENT CULTURES:  Culture Results:   No Growth Final (12-30 @ 00:43)  Culture Results:   No Growth Final (12-30 @ 00:43)  Culture Results:   No growth (12-30 @ 00:38)        Culture - Blood (collected 12-30-21 @ 00:43)  Source: .Blood Blood-Peripheral  Final Report (01-04-22 @ 01:00):    No Growth Final    Culture - Blood (collected 12-30-21 @ 00:43)  Source: .Blood Blood-Peripheral  Final Report (01-04-22 @ 01:00):    No Growth Final    Culture - Urine (collected 12-30-21 @ 00:38)  Source: Clean Catch Clean Catch (Midstream)  Final Report (12-30-21 @ 22:38):    No growth

## 2022-01-05 NOTE — PROGRESS NOTE ADULT - SUBJECTIVE AND OBJECTIVE BOX
Patient is a 89y old  Male who presents with a chief complaint of fever    weakness (05 Jan 2022 10:14)    Patient seen in follow up for hypernatremia, SMITH.        PAST MEDICAL HISTORY:  Shoulder fracture    Bladder cancer    Stented coronary artery    Parkinsons      MEDICATIONS  (STANDING):  aspirin  chewable 81 milliGRAM(s) Oral daily  dexAMETHasone     Tablet 6 milliGRAM(s) Oral daily  dextrose 5%. 1000 milliLiter(s) (50 mL/Hr) IV Continuous <Continuous>  heparin   Injectable 5000 Unit(s) SubCutaneous every 8 hours  lactobacillus acidophilus 1 Tablet(s) Oral two times a day  multivitamin 1 Tablet(s) Oral daily  piperacillin/tazobactam IVPB.. 3.375 Gram(s) IV Intermittent every 12 hours  tamsulosin 0.4 milliGRAM(s) Oral at bedtime    MEDICATIONS  (PRN):  acetaminophen     Tablet .. 650 milliGRAM(s) Oral every 6 hours PRN Temp greater or equal to 38C (100.4F), Mild Pain (1 - 3)  guaiFENesin Oral Liquid (Sugar-Free) 200 milliGRAM(s) Oral every 6 hours PRN Cough    T(C): 36.8 (01-05-22 @ 13:19), Max: 37.3 (01-04-22 @ 21:04)  HR: 102 (01-05-22 @ 13:19) (93 - 102)  BP: 111/61 (01-05-22 @ 13:19) (102/59 - 158/76)  RR: 20 (01-05-22 @ 13:19) (17 - 20)  SpO2: 92% (01-05-22 @ 13:19) (91% - 93%)  Wt(kg): --  I&O's Detail    04 Jan 2022 07:01  -  05 Jan 2022 07:00  --------------------------------------------------------  IN:    dextrose 5%: 550 mL  Total IN: 550 mL    OUT:    Incontinent per Condom Catheter (mL): 650 mL    Voided (mL): 900 mL  Total OUT: 1550 mL    Total NET: -1000 mL      05 Jan 2022 07:01  -  05 Jan 2022 18:02  --------------------------------------------------------  IN:  Total IN: 0 mL    OUT:    Voided (mL): 500 mL  Total OUT: 500 mL    Total NET: -500 mL          PHYSICAL EXAM:  General: No distress  Respiratory: b/l air entry  Cardiovascular: S1 S2  Gastrointestinal: soft  Extremities:  no edema          01-04    155<H>  |  122<H>  |  70<H>  ----------------------------<  117<H>  3.6   |  20<L>  |  3.40<H>    Ca    8.9      04 Jan 2022 08:39                Sodium, Serum: 155 (01-04 @ 08:39)  Sodium, Serum: 155 (01-03 @ 09:03)    Creatinine, Serum: 3.40 (01-04 @ 08:39)  Creatinine, Serum: 3.50 (01-03 @ 09:03)    Potassium, Serum: 3.6 (01-04 @ 08:39)  Potassium, Serum: 2.8 (01-03 @ 09:03)    Hemoglobin: 9.8 (01-03 @ 09:03)    < from: US Renal (11.25.21 @ 08:13) >  EXAM:  US KIDNEY(S)                            PROCEDURE DATE:  11/25/2021          INTERPRETATION:  CLINICAL INFORMATION: Renal failure.    COMPARISON: CT scan abdomen pelvis 11/24/2021.    TECHNIQUE: Sonography of the kidneys and bladder.    FINDINGS:  Examination is technically limited, patient not able to adequately position.    Right kidney: 10 cm.  Mild fullness right renal pelvis.  7 cm cyst with dependent echogenic debris.  3.4 cm cyst at the lower pole.    Left kidney: 11.5 cm. Evaluation of the left kidney is technically limited due to shadowing.  No hydronephrosis noted.  Large 8.5 cm cyst.    Urinary bladder: Within normal limits.  Patient is not able to void for this exam.    IMPRESSION:    Technically limited study.    Right renal pelviectasis.    Large bilateral renal cysts, with complex appearing cyst right kidney.    --- End of Report ---            MARCELLO SALES MD; Attending Radiologist  This document has been electronically signed. Nov 26 2021  8:12AM    < end of copied text >

## 2022-01-05 NOTE — PROGRESS NOTE ADULT - SUBJECTIVE AND OBJECTIVE BOX
[facility] DIVISION of INFECTIOUS DISEASE  Theodore Carmona MD PhD, Liliane Perry MD, Hina Triplett MD, Maico Gomez MD, Xavier Stoddard MD  and providing coverage with Clari Roberto MD and Vanesa Vargas MD  Providing Infectious Disease Consultations at [facility], [facility]'s      Office# [phone] to schedule follow up appointments  Answering Service for urgent calls or New Consults [phone]    Infectious Diseases Progress Note:    HAYDER MEYER is a 89y year old Male patient    COVID-19 Patient  Notes reviewed  No concerning events overnight.   Allergies: clindamycin (Unknown)  Levaquin (Unknown)    ANTIBIOTICS/RELEVANT:  Antimicrobials piperacillin/tazobactam IVPB.. 3.375 Gram(s) IV Intermittent every 12 hours    Immunologic:   Other Meds: acetaminophen     Tablet .. 650 milliGRAM(s) Oral every 6 hours PRN  aspirin  chewable 81 milliGRAM(s) Oral daily  dexAMETHasone     Tablet 6 milliGRAM(s) Oral daily  dextrose 5%. 1000 milliLiter(s) IV Continuous <Continuous>  guaiFENesin Oral Liquid (Sugar-Free) 200 milliGRAM(s) Oral every 6 hours PRN  heparin   Injectable 5000 Unit(s) SubCutaneous every 8 hours  lactobacillus acidophilus 1 Tablet(s) Oral two times a day  multivitamin 1 Tablet(s) Oral daily  tamsulosin 0.4 milliGRAM(s) Oral at bedtime    Objective:  Vital Signs Last 24 Hrs  T(F): 98.4 (05 Jan 2022 04:59), Max: 99.1 (04 Jan 2022 21:04)  HR: 93 (05 Jan 2022 04:59) (93 - 94)  BP: 128/55 (05 Jan 2022 04:59) (102/59 - 158/76)  RR: 17 (05 Jan 2022 04:59) (17 - 18)  SpO2: 92% (05 Jan 2022 04:59) (91% - 93%)  PHYSICAL EXAM:  HEENT: NC/AT, anicteric, supple  Respiratory: no acc muscle use, breathing comfortably  Cardiovascular: S1 S2 present, normal rate  Gastrointestinal: normal appearing, nondistended  Extremities: no edema, no cyanosis    LABS:    WBC trend:  6.46 01-03 @ 09:03  2.55 12-30 @ 08:57  2.27 12-29 @ 16:38    01-04    155<H>  |  122<H>  |  70<H>  ----------------------------<  117<H>  3.6   |  20<L>  |  3.40<H>    Ca    8.9      04 Jan 2022 08:39    Creatinine, Serum: 3.40 mg/dL (01-04-22 @ 08:39)  Creatinine, Serum: 3.50 mg/dL (01-03-22 @ 09:03)  Creatinine, Serum: 2.30 mg/dL (12-30-21 @ 08:57)  Creatinine, Serum: 2.60 mg/dL (12-29-21 @ 16:38)    Auto Neutrophil #: 5.36 K/uL (01-03-22 @ 09:03)  Auto Neutrophil #: 1.55 K/uL (12-30-21 @ 08:57)  Auto Neutrophil #: 1.52 K/uL (12-29-21 @ 16:38)    Auto Lymphocyte #: 0.26 K/uL (01-03-22 @ 09:03)  Auto Lymphocyte #: 0.52 K/uL (12-30-21 @ 08:57)  Auto Lymphocyte #: 0.57 K/uL (12-29-21 @ 16:38)    Procalcitonin, Serum: 0.12 ng/mL (12-30-21 @ 16:47)    Sedimentation Rate, Erythrocyte: 42 mm/hr (12-29-21 @ 16:38)    Lactate, Blood: 1.6 mmol/L (12-29-21 @ 16:38)    Prothrombin Time, Plasma: 12.0 sec (12-29-21 @ 16:38)    Activated Partial Thromboplastin Time: 32.1 sec (12-29-21 @ 16:38)    C-Reactive Protein, Serum: 27 mg/L (12-31-21 @ 01:39)    MICROBIOLOGY: reviewed  Culture - Blood (collected 12-30-21 @ 00:43)  Source: .Blood Blood-Peripheral  Final Report (01-04-22 @ 01:00):    No Growth Final    Culture - Blood (collected 12-30-21 @ 00:43)  Source: .Blood Blood-Peripheral  Final Report (01-04-22 @ 01:00):    No Growth Final    Culture - Urine (collected 12-30-21 @ 00:38)  Source: Clean Catch Clean Catch (Midstream)  Final Report (12-30-21 @ 22:38):    No growth    RADIOLOGY & ADDITIONAL STUDIES: reviewed

## 2022-01-05 NOTE — PROGRESS NOTE ADULT - ASSESSMENT
89 yo M w/ PMHx of Parkinson's Disease (on no medications), CAD (s/p stent), Bladder cancer    covid  ckd  htn  OP  OA  eval for Dysphagia  hypoxemia  ? PNA     heme occult neg    procalcitonin - crp - noted  labs reviewed  PO intake  ID f/u noted  serum Na pending  on Zosyn    SLP eval noted  Parkinson's hx - risk for aspiration  Covid - hypoxemia - remdesivir and decadron  monitor VS and HD and Sat  serial D dimer  DVT p  assist with needs - ADL  isolation precs  assist with needs - ADL  GOC - DNR  prognosis guarded  old records reviewed

## 2022-01-05 NOTE — PROGRESS NOTE ADULT - ASSESSMENT
1.  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  Hypokalemia  3.  Hypernatremia    Continue hypotonic fluid infusion. Potassium supplementation. Encourage PO intake as tolerated.   Renal sonogram from 11/2021 results noted. Will follow electrolytes and renal function trend.

## 2022-01-05 NOTE — PROGRESS NOTE ADULT - ASSESSMENT
89yo M w/ PMHx of Parkinson's Disease (on no medications), CAD (s/p stent), Bladder cancer (s/p tumor resection) sent from [facility] for pna, lethargy, fevers.  pt currently on ABx cefepime, doxy, flagyl for sacral ulcer and colitis, has xr showing bl pna. sent to ed  pt does not provide history    COVID-19 PNA +/- superimposed bacterial PNA  AHRF on O2, now on RA  Fevers, resolved   Leukopenia, resolved   COVID-19-high risk for decompensation EARLY INFLAMMATORY PHASE (7-14 days post symptom onset)   -s/p remdesivir x5 day course completed 1/3  -c/w steroids x10 day course  -infectious w/u negative to date  -imaging reviewed--CXR basilar consolidations  -trend biomarkers, temps/WBC  --fevers noted -- afebrile >24h- likely course of disease, c/w monitoring  -maintain aspiration precautions  -isolation precautions per infection control policies  -s/p pip-tazo for possible superimposed bacterial PNA - completed 7 day course 1/4    Colitis/sacral ulcer  -s/p zosyn as above, no need to c/w abx from NH    Stable from ID standpoint.   Discharge planning per primary team.     Maico Gomez M.D.  [facility], Division of Infectious Diseases  [phone]  After 5pm on weekdays and all day on weekends - please call [phone]

## 2022-01-05 NOTE — PROGRESS NOTE ADULT - SUBJECTIVE AND OBJECTIVE BOX
Date/Time Patient Seen:  		  Referring MD:   Data Reviewed	       Patient is a 89y old  Male who presents with a chief complaint of fever    weakness (04 Jan 2022 23:31)      Subjective/HPI     PAST MEDICAL & SURGICAL HISTORY:  Shoulder fracture    Bladder cancer    Stented coronary artery  over 15 years ago as per patient    Parkinsons    S/P cardiac catheterization          Medication list         MEDICATIONS  (STANDING):  aspirin  chewable 81 milliGRAM(s) Oral daily  dexAMETHasone     Tablet 6 milliGRAM(s) Oral daily  dextrose 5%. 1000 milliLiter(s) (50 mL/Hr) IV Continuous <Continuous>  heparin   Injectable 5000 Unit(s) SubCutaneous every 8 hours  lactobacillus acidophilus 1 Tablet(s) Oral two times a day  multivitamin 1 Tablet(s) Oral daily  piperacillin/tazobactam IVPB.. 3.375 Gram(s) IV Intermittent every 12 hours  tamsulosin 0.4 milliGRAM(s) Oral at bedtime    MEDICATIONS  (PRN):  acetaminophen     Tablet .. 650 milliGRAM(s) Oral every 6 hours PRN Temp greater or equal to 38C (100.4F), Mild Pain (1 - 3)  guaiFENesin Oral Liquid (Sugar-Free) 200 milliGRAM(s) Oral every 6 hours PRN Cough         Vitals log        ICU Vital Signs Last 24 Hrs  T(C): 36.9 (05 Jan 2022 04:59), Max: 37.3 (04 Jan 2022 21:04)  T(F): 98.4 (05 Jan 2022 04:59), Max: 99.1 (04 Jan 2022 21:04)  HR: 93 (05 Jan 2022 04:59) (93 - 94)  BP: 128/55 (05 Jan 2022 04:59) (102/59 - 158/76)  BP(mean): --  ABP: --  ABP(mean): --  RR: 17 (05 Jan 2022 04:59) (17 - 18)  SpO2: 92% (05 Jan 2022 04:59) (91% - 93%)           Input and Output:  I&O's Detail    03 Jan 2022 07:01  -  04 Jan 2022 07:00  --------------------------------------------------------  IN:    dextrose 5% with potassium chloride 20 mEq/L: 975 mL    IV PiggyBack: 150 mL    IV PiggyBack: 250 mL    Oral Fluid: 660 mL    sodium chloride 0.9% w/ Additives: 300 mL  Total IN: 2335 mL    OUT:    Incontinent per Condom Catheter (mL): 400 mL  Total OUT: 400 mL    Total NET: 1935 mL      04 Jan 2022 07:01  -  05 Jan 2022 05:15  --------------------------------------------------------  IN:  Total IN: 0 mL    OUT:    Voided (mL): 900 mL  Total OUT: 900 mL    Total NET: -900 mL          Lab Data                        9.8    6.46  )-----------( 143      ( 03 Jan 2022 09:03 )             29.8     01-04    155<H>  |  122<H>  |  70<H>  ----------------------------<  117<H>  3.6   |  20<L>  |  3.40<H>    Ca    8.9      04 Jan 2022 08:39              Review of Systems	      Objective     Physical Examination  heart s1s2  lung dec BS  abd soft  head nc        Pertinent Lab findings & Imaging      Lalo:  NO   Adequate UO     I&O's Detail    03 Jan 2022 07:01  -  04 Jan 2022 07:00  --------------------------------------------------------  IN:    dextrose 5% with potassium chloride 20 mEq/L: 975 mL    IV PiggyBack: 150 mL    IV PiggyBack: 250 mL    Oral Fluid: 660 mL    sodium chloride 0.9% w/ Additives: 300 mL  Total IN: 2335 mL    OUT:    Incontinent per Condom Catheter (mL): 400 mL  Total OUT: 400 mL    Total NET: 1935 mL      04 Jan 2022 07:01  -  05 Jan 2022 05:15  --------------------------------------------------------  IN:  Total IN: 0 mL    OUT:    Voided (mL): 900 mL  Total OUT: 900 mL    Total NET: -900 mL               Discussed with:     Cultures:	        Radiology

## 2022-01-05 NOTE — DISCHARGE NOTE PROVIDER - NSDCCPCAREPLAN_GEN_ALL_CORE_FT
PRINCIPAL DISCHARGE DIAGNOSIS  Diagnosis: PNA (pneumonia)  Assessment and Plan of Treatment:       SECONDARY DISCHARGE DIAGNOSES  Diagnosis: SMITH (acute kidney injury)  Assessment and Plan of Treatment:

## 2022-01-05 NOTE — DISCHARGE NOTE PROVIDER - NSDCFUADDINST_GEN_ALL_CORE_FT
· Assessment	  Patient presents with unstageable pressure injury 15 x 10 no drainage, tissue black/dark purple at intergluteal fold, sacral region, bilateral buttocks Patient non-verbal, using FACES pain scale while turning patient pain 6/10 of 10/10 scale periwound with erythema  Recommend: TRIAD BID and PRN    Continue  Nutrition (as tolerated)  Continue  Offloading   Continue Pericare  Apply cair boots at all times while in bed.   Provide skin checks and foot placement q8h.  Care as per medicine will follow w/ you  Findings and recommendations discussed with CHANDLER Dover   Thank you for this consult  Malena Singh NP, [phone]

## 2022-01-05 NOTE — DISCHARGE NOTE PROVIDER - CARE PROVIDER_API CALL
Rich Dover)  Internal Medicine  [address], [address]  [place] [address]  Phone: [phone]  Fax: [phone]  Follow Up Time:

## 2022-01-05 NOTE — DISCHARGE NOTE PROVIDER - HOSPITAL COURSE
fever , covid   Chief Complaint: shortness of breath.    · Chief Complaint: The patient is a 89y Male complaining of shortness of breath.  · HPI Objective Statement: 89yo M w/ PMHx of Parkinson's Disease (on no medications), CAD (s/p stent), Bladder cancer (s/p tumor resection) sent from [facility] for pna, lethargy, fevers.  pt currently on ABx cefepime, doxy, flagyl for sacral ulcer and colitis, has xr showing bl pna. sent to ed  pt does not provide history      PAST MEDICAL/SURGICAL/FAMILY/SOCIAL HISTORY:    Past Medical, Past Surgical, and Family History:  PAST MEDICAL HISTORY:  Bladder cancer     Parkinsons     Shoulder fracture     Stented coronary artery over 15 years ago as per patient.     PAST SURGICAL HISTORY:  S/P cardiac catheterization.       patient with infiltrate on cxr and cough and fever   and poor po intake    do iv abx ,   ID eval    hydrate   dvt and gi prophylax  prognosis is poor     12.30.21   weak    covid + , ID eval , iv abx ,   d/w wife    agrees to palliative care and dnr and dni    on 12.31 21 as per ID:    COVID-19 PNA +/- superimposed bacterial PNA  AHRF on O2  Fevers  COVID-19-high risk for decompensation EARLY INFLAMMATORY PHASE (7-14 days post symptom onset)   Patient is not a candidate for mAB at this time given that he was hypoxemic on admission and currently on O2 therapy--d/w Dr. Anderson as well.  -would start remdesivir x5 days until 1/3/2022  -would start steroids/AC on this patient  -infectious w/u pending--will f/u  -imaging reviewed CXR basilar consolidations  -trend biomarkers  -trend temps/WBC  -maintain aspiration precautions  -isolation precautions per infection control policies  -Would c/w zosyn for possible superimposed bacterial PNA---duration pending clinical improvement    Colitis/sacral ulcer  -on zosyn as above, no need to c/w abx from NH    Infectious Diseases will continue to follow. Please call with any questions.     on 1.2.2022 plan as per ID:  COVID-19 PNA +/- superimposed bacterial PNA  AHRF on O2  Fevers  Leukopenia  COVID-19-high risk for decompensation EARLY INFLAMMATORY PHASE (7-14 days post symptom onset)   -c/w remdesivir x5 day course  -c/w steroids x10 day course  -infectious w/u negative to date  -imaging reviewed--CXR basilar consolidations  -trend biomarkers  -trend temps/WBC  --fevers noted, likely course of disease, will c/w monitoring  -maintain aspiration precautions  -isolation precautions per infection control policies  -Would c/w zosyn for possible superimposed bacterial PNA---x5-7 day course pending clinical improvement    Colitis/sacral ulcer  -on zosyn as above, no need to c/w abx from NH    Infectious Diseases will continue to follow. Please call with any questions.          Nutritional Assessment:  · Nutritional Assessment  This patient has been assessed with a concern for Malnutrition and has been determined to have a diagnosis/diagnoses of Severe protein-calorie malnutrition and Underweight (BMI < 19).    This patient is being managed with:   Diet Pureed-  Entered: Jan 5 2022  9:06AM   fever , covid   Chief Complaint: shortness of breath.    · Chief Complaint: The patient is a 89y Male complaining of shortness of breath.  · HPI Objective Statement: 89yo M w/ PMHx of Parkinson's Disease (on no medications), CAD (s/p stent), Bladder cancer (s/p tumor resection) sent from [facility] for pna, lethargy, fevers.  pt currently on ABx cefepime, doxy, flagyl for sacral ulcer and colitis, has xr showing bl pna. sent to ed  pt does not provide history      PAST MEDICAL/SURGICAL/FAMILY/SOCIAL HISTORY:    Past Medical, Past Surgical, and Family History:  PAST MEDICAL HISTORY:  Bladder cancer     Parkinsons     Shoulder fracture     Stented coronary artery over 15 years ago as per patient.     PAST SURGICAL HISTORY:  S/P cardiac catheterization.       patient with infiltrate on cxr and cough and fever   and poor po intake    do iv abx ,   ID eval    hydrate   dvt and gi prophylax  prognosis is poor     12.30.21   weak    covid + , ID eval , iv abx ,   d/w wife    agrees to palliative care and dnr and dni    on 12.31 21 as per ID:    COVID-19 PNA +/- superimposed bacterial PNA  AHRF on O2  Fevers  COVID-19-high risk for decompensation EARLY INFLAMMATORY PHASE (7-14 days post symptom onset)   Patient is not a candidate for mAB at this time given that he was hypoxemic on admission and currently on O2 therapy--d/w Dr. Anderson as well.  -would start remdesivir x5 days until 1/3/2022  -would start steroids/AC on this patient  -infectious w/u pending--will f/u  -imaging reviewed CXR basilar consolidations  -trend biomarkers  -trend temps/WBC  -maintain aspiration precautions  -isolation precautions per infection control policies  -Would c/w zosyn for possible superimposed bacterial PNA---duration pending clinical improvement    Colitis/sacral ulcer  -on zosyn as above, no need to c/w abx from NH    Infectious Diseases will continue to follow. Please call with any questions.     on 1.2.2022 plan as per ID:  COVID-19 PNA +/- superimposed bacterial PNA  AHRF on O2  Fevers  Leukopenia  COVID-19-high risk for decompensation EARLY INFLAMMATORY PHASE (7-14 days post symptom onset)   -c/w remdesivir x5 day course  -c/w steroids x10 day course  -infectious w/u negative to date  -imaging reviewed--CXR basilar consolidations  -trend biomarkers  -trend temps/WBC  --fevers noted, likely course of disease, will c/w monitoring  -maintain aspiration precautions  -isolation precautions per infection control policies  -Would c/w zosyn for possible superimposed bacterial PNA---x5-7 day course pending clinical improvement    Colitis/sacral ulcer  -post zosyn as above, no need to c/w abx from NH    Infectious Diseases will continue to follow. Please call with any questions.          Nutritional Assessment:  · Nutritional Assessment  This patient has been assessed with a concern for Malnutrition and has been determined to have a diagnosis/diagnoses of Severe protein-calorie malnutrition and Underweight (BMI < 19).    This patient is being managed with:   Diet Pureed-  Entered: Jan 5 2022  9:06AM   fever , covid   Chief Complaint: shortness of breath. weak    · Chief Complaint: The patient is a 89y Male complaining of shortness of breath.  · HPI Objective Statement: 89yo M w/ PMHx of Parkinson's Disease (on no medications), CAD (s/p stent), Bladder cancer (s/p tumor resection) sent from [facility] for pna, lethargy, fevers.  pt currently on ABx cefepime, doxy, flagyl for sacral ulcer and colitis, has xr showing bl pna. sent to ed  pt does not provide history      PAST MEDICAL/SURGICAL/FAMILY/SOCIAL HISTORY:    Past Medical, Past Surgical, and Family History:  PAST MEDICAL HISTORY:  Bladder cancer     Parkinsons     Shoulder fracture     Stented coronary artery over 15 years ago as per patient.     PAST SURGICAL HISTORY:  S/P cardiac catheterization.       patient with infiltrate on cxr and cough and fever   and poor po intake    do iv abx ,   ID eval    hydrate   dvt and gi prophylax  prognosis is poor     12.30.21   weak    covid + , ID eval , iv abx ,   d/w wife    agrees to palliative care and dnr and dni    on 12.31 21 as per ID:    COVID-19 PNA +/- superimposed bacterial PNA  AHRF on O2  Fevers  COVID-19-high risk for decompensation EARLY INFLAMMATORY PHASE (7-14 days post symptom onset)   Patient is not a candidate for mAB at this time given that he was hypoxemic on admission and currently on O2 therapy--d/w Dr. Anderson as well.  -would start remdesivir x5 days until 1/3/2022  -would start steroids/AC on this patient  -infectious w/u pending--will f/u  -imaging reviewed CXR basilar consolidations  -trend biomarkers  -trend temps/WBC  -maintain aspiration precautions  -isolation precautions per infection control policies  -Would c/w zosyn for possible superimposed bacterial PNA---duration pending clinical improvement    Colitis/sacral ulcer  -on zosyn as above, no need to c/w abx from NH    Infectious Diseases will continue to follow. Please call with any questions.     on 1.2.2022 plan as per ID:  COVID-19 PNA +/- superimposed bacterial PNA  AHRF on O2  Fevers  Leukopenia  COVID-19-high risk for decompensation EARLY INFLAMMATORY PHASE (7-14 days post symptom onset)   -c/w remdesivir x5 day course  -c/w steroids x10 day course  -infectious w/u negative to date  -imaging reviewed--CXR basilar consolidations  -trend biomarkers  -trend temps/WBC  --fevers noted, likely course of disease, will c/w monitoring  -maintain aspiration precautions  -isolation precautions per infection control policies  -Would c/w zosyn for possible superimposed bacterial PNA---x5-7 day course pending clinical improvement    Colitis/sacral ulcer  -post zosyn as above, no need to c/w abx from NH    Infectious Diseases will continue to follow. Please call with any questions.          Nutritional Assessment:  · Nutritional Assessment  This patient has been assessed with a concern for Malnutrition and has been determined to have a diagnosis/diagnoses of Severe protein-calorie malnutrition and Underweight (BMI < 19).    This patient is being managed with:   Diet Pureed-  Entered: Jan 5 2022  9:06AM

## 2022-01-05 NOTE — DISCHARGE NOTE PROVIDER - NSDCMRMEDTOKEN_GEN_ALL_CORE_FT
acetaminophen 325 mg oral tablet: 2 tab(s) orally every 6 hours, As needed, for pain  aspirin 81 mg oral tablet, chewable: 1 tab(s) orally once a day  heparin: 5000 unit(s) subcutaneous 2 times a day  Milk of Magnesia 8% oral suspension: 30 milliliter(s) orally once a day, As Needed if no bowel movement in 6 consecutive shifts  mirtazapine 15 mg oral tablet: 1 tab(s) orally once a day (at bedtime)  Multiple Vitamins oral tablet: 1 tab(s) orally once a day  tamsulosin 0.4 mg oral capsule: 1 cap(s) orally once a day (at bedtime)

## 2022-01-06 LAB
ANION GAP SERPL CALC-SCNC: 8 MMOL/L — SIGNIFICANT CHANGE UP (ref 5–17)
BUN SERPL-MCNC: 59 MG/DL — HIGH (ref 7–23)
CALCIUM SERPL-MCNC: 7.9 MG/DL — LOW (ref 8.5–10.1)
CHLORIDE SERPL-SCNC: 117 MMOL/L — HIGH (ref 96–108)
CO2 SERPL-SCNC: 23 MMOL/L — SIGNIFICANT CHANGE UP (ref 22–31)
CREAT SERPL-MCNC: 2.7 MG/DL — HIGH (ref 0.5–1.3)
GLUCOSE SERPL-MCNC: 110 MG/DL — HIGH (ref 70–99)
POTASSIUM SERPL-MCNC: 3.1 MMOL/L — LOW (ref 3.5–5.3)
POTASSIUM SERPL-SCNC: 3.1 MMOL/L — LOW (ref 3.5–5.3)
SODIUM SERPL-SCNC: 148 MMOL/L — HIGH (ref 135–145)

## 2022-01-06 PROCEDURE — 93971 EXTREMITY STUDY: CPT | Mod: 26,RT

## 2022-01-06 RX ORDER — POTASSIUM CHLORIDE 20 MEQ
10 PACKET (EA) ORAL
Refills: 0 | Status: COMPLETED | OUTPATIENT
Start: 2022-01-06 | End: 2022-01-06

## 2022-01-06 RX ADMIN — TAMSULOSIN HYDROCHLORIDE 0.4 MILLIGRAM(S): 0.4 CAPSULE ORAL at 21:34

## 2022-01-06 RX ADMIN — Medication 1 TABLET(S): at 06:19

## 2022-01-06 RX ADMIN — HEPARIN SODIUM 5000 UNIT(S): 5000 INJECTION INTRAVENOUS; SUBCUTANEOUS at 06:18

## 2022-01-06 RX ADMIN — Medication 6 MILLIGRAM(S): at 06:19

## 2022-01-06 RX ADMIN — Medication 1 TABLET(S): at 17:33

## 2022-01-06 RX ADMIN — Medication 81 MILLIGRAM(S): at 11:37

## 2022-01-06 RX ADMIN — HEPARIN SODIUM 5000 UNIT(S): 5000 INJECTION INTRAVENOUS; SUBCUTANEOUS at 13:42

## 2022-01-06 RX ADMIN — DEXTROSE MONOHYDRATE, SODIUM CHLORIDE, AND POTASSIUM CHLORIDE 80 MILLILITER(S): 50; .745; 4.5 INJECTION, SOLUTION INTRAVENOUS at 06:27

## 2022-01-06 RX ADMIN — Medication 100 MILLIEQUIVALENT(S): at 20:54

## 2022-01-06 RX ADMIN — Medication 100 MILLIEQUIVALENT(S): at 17:32

## 2022-01-06 RX ADMIN — Medication 100 MILLIEQUIVALENT(S): at 19:17

## 2022-01-06 RX ADMIN — Medication 1 TABLET(S): at 11:37

## 2022-01-06 RX ADMIN — HEPARIN SODIUM 5000 UNIT(S): 5000 INJECTION INTRAVENOUS; SUBCUTANEOUS at 22:43

## 2022-01-06 NOTE — PROGRESS NOTE ADULT - ASSESSMENT
Chief Complaint: shortness of breath.    · Chief Complaint: The patient is a 89y Male complaining of shortness of breath.  · HPI Objective Statement: 89yo M w/ PMHx of Parkinson's Disease (on no medications), CAD (s/p stent), Bladder cancer (s/p tumor resection) sent from [facility] for pna, lethargy, fevers.  pt currently on ABx cefepime, doxy, flagyl for sacral ulcer and colitis, has xr showing bl pna. sent to ed  pt does not provide history      PAST MEDICAL/SURGICAL/FAMILY/SOCIAL HISTORY:    Past Medical, Past Surgical, and Family History:  PAST MEDICAL HISTORY:  Bladder cancer ,Parkinsons ,Shoulder fracture ,Stented coronary artery over 15 years ago as per patient.     PAST SURGICAL HISTORY:  S/P cardiac catheterization.     on 1.2.2022 plan as per ID:  COVID-19 PNA +/- superimposed bacterial PNA  AHRF on O2  Fevers  Leukopenia  COVID-19-high risk for decompensation EARLY INFLAMMATORY PHASE (7-14 days post symptom onset)   -c/w remdesivir x5 day course  -c/w steroids x10 day course  -infectious w/u negative to date  -imaging reviewed--CXR basilar consolidations  -trend biomarkers  -trend temps/WBC  --fevers noted, likely course of disease, will c/w monitoring  -maintain aspiration precautions  -isolation precautions per infection control policies  -Would c/w zosyn for possible superimposed bacterial PNA---x5-7 day course pending clinical improvement    Colitis/sacral ulcer  -on zosyn as above, no need to c/w abx from NH    Infectious Diseases will continue to follow. Please call with any questions.

## 2022-01-06 NOTE — PROGRESS NOTE ADULT - SUBJECTIVE AND OBJECTIVE BOX
Date/Time Patient Seen:  		  Referring MD:   Data Reviewed	       Patient is a 89y old  Male who presents with a chief complaint of fever    weakness (05 Jan 2022 18:01)      Subjective/HPI     PAST MEDICAL & SURGICAL HISTORY:  Shoulder fracture    Bladder cancer    Stented coronary artery  over 15 years ago as per patient    Parkinsons    S/P cardiac catheterization          Medication list         MEDICATIONS  (STANDING):  aspirin  chewable 81 milliGRAM(s) Oral daily  dexAMETHasone     Tablet 6 milliGRAM(s) Oral daily  dextrose 5% with potassium chloride 20 mEq/L 1000 milliLiter(s) (80 mL/Hr) IV Continuous <Continuous>  heparin   Injectable 5000 Unit(s) SubCutaneous every 8 hours  lactobacillus acidophilus 1 Tablet(s) Oral two times a day  multivitamin 1 Tablet(s) Oral daily  tamsulosin 0.4 milliGRAM(s) Oral at bedtime    MEDICATIONS  (PRN):  acetaminophen     Tablet .. 650 milliGRAM(s) Oral every 6 hours PRN Temp greater or equal to 38C (100.4F), Mild Pain (1 - 3)  guaiFENesin Oral Liquid (Sugar-Free) 200 milliGRAM(s) Oral every 6 hours PRN Cough         Vitals log        ICU Vital Signs Last 24 Hrs  T(C): 36.9 (06 Jan 2022 04:49), Max: 36.9 (05 Jan 2022 21:07)  T(F): 98.5 (06 Jan 2022 04:49), Max: 98.5 (06 Jan 2022 04:49)  HR: 96 (06 Jan 2022 04:49) (96 - 104)  BP: 107/64 (06 Jan 2022 04:49) (107/64 - 111/61)  BP(mean): --  ABP: --  ABP(mean): --  RR: 18 (06 Jan 2022 04:49) (18 - 20)  SpO2: 91% (06 Jan 2022 04:49) (91% - 93%)           Input and Output:  I&O's Detail    04 Jan 2022 07:01  -  05 Jan 2022 07:00  --------------------------------------------------------  IN:    dextrose 5%: 550 mL  Total IN: 550 mL    OUT:    Incontinent per Condom Catheter (mL): 650 mL    Voided (mL): 900 mL  Total OUT: 1550 mL    Total NET: -1000 mL      05 Jan 2022 07:01  -  06 Jan 2022 05:19  --------------------------------------------------------  IN:    dextrose 5% with potassium chloride 20 mEq/L: 800 mL  Total IN: 800 mL    OUT:    Incontinent per Condom Catheter (mL): 400 mL    Voided (mL): 500 mL  Total OUT: 900 mL    Total NET: -100 mL          Lab Data    01-04    155<H>  |  122<H>  |  70<H>  ----------------------------<  117<H>  3.6   |  20<L>  |  3.40<H>    Ca    8.9      04 Jan 2022 08:39              Review of Systems	      Objective     Physical Examination  heart s1s2  lung dec BS  abd soft        Pertinent Lab findings & Imaging      Lalo:  NO   Adequate UO     I&O's Detail    04 Jan 2022 07:01  -  05 Jan 2022 07:00  --------------------------------------------------------  IN:    dextrose 5%: 550 mL  Total IN: 550 mL    OUT:    Incontinent per Condom Catheter (mL): 650 mL    Voided (mL): 900 mL  Total OUT: 1550 mL    Total NET: -1000 mL      05 Jan 2022 07:01  -  06 Jan 2022 05:19  --------------------------------------------------------  IN:    dextrose 5% with potassium chloride 20 mEq/L: 800 mL  Total IN: 800 mL    OUT:    Incontinent per Condom Catheter (mL): 400 mL    Voided (mL): 500 mL  Total OUT: 900 mL    Total NET: -100 mL               Discussed with:     Cultures:	        Radiology

## 2022-01-06 NOTE — PROGRESS NOTE ADULT - SUBJECTIVE AND OBJECTIVE BOX
[facility] DIVISION of INFECTIOUS DISEASE  Theodore Carmona MD PhD, Liliane Perry MD, Hina Triplett MD, Maico Gomez MD, Xavier Stoddard MD  and providing coverage with Clari Roberto MD and Vanesa Vargas MD  Providing Infectious Disease Consultations at [facility], [facility]'s      Office# [phone] to schedule follow up appointments  Answering Service for urgent calls or New Consults [phone]    Infectious Diseases Progress Note:    HAYDER MEYER is a 89y year old Male patient    COVID-19 Patient  Notes reviewed  No concerning events overnight.   Allergies: clindamycin (Unknown)  Levaquin (Unknown)    ANTIBIOTICS/RELEVANT:  Antimicrobials   Immunologic:   Other Meds: acetaminophen     Tablet .. 650 milliGRAM(s) Oral every 6 hours PRN  aspirin  chewable 81 milliGRAM(s) Oral daily  dexAMETHasone     Tablet 6 milliGRAM(s) Oral daily  dextrose 5% with potassium chloride 20 mEq/L 1000 milliLiter(s) IV Continuous <Continuous>  guaiFENesin Oral Liquid (Sugar-Free) 200 milliGRAM(s) Oral every 6 hours PRN  heparin   Injectable 5000 Unit(s) SubCutaneous every 8 hours  lactobacillus acidophilus 1 Tablet(s) Oral two times a day  multivitamin 1 Tablet(s) Oral daily  tamsulosin 0.4 milliGRAM(s) Oral at bedtime    Objective:  Vital Signs Last 24 Hrs  T(F): 98.5 (06 Jan 2022 04:49), Max: 98.5 (06 Jan 2022 04:49)  HR: 96 (06 Jan 2022 04:49) (96 - 104)  BP: 107/64 (06 Jan 2022 04:49) (107/64 - 111/61)  RR: 18 (06 Jan 2022 04:49) (18 - 20)  SpO2: 91% (06 Jan 2022 04:49) (91% - 93%)  PHYSICAL EXAM:  HEENT: NC/AT, anicteric, supple  Respiratory: no acc muscle use, breathing comfortably  Cardiovascular: S1 S2 present, normal rate  Gastrointestinal: normal appearing, nondistended  Extremities: no edema, no cyanosis    LABS:    WBC trend:  6.46 01-03 @ 09:03  2.55 12-30 @ 08:57    01-04    155<H>  |  122<H>  |  70<H>  ----------------------------<  117<H>  3.6   |  20<L>  |  3.40<H>    Ca    8.9      04 Jan 2022 08:39      Creatinine, Serum: 3.40 mg/dL (01-04-22 @ 08:39)  Creatinine, Serum: 3.50 mg/dL (01-03-22 @ 09:03)  Creatinine, Serum: 2.30 mg/dL (12-30-21 @ 08:57)    Auto Neutrophil #: 5.36 K/uL (01-03-22 @ 09:03)  Auto Neutrophil #: 1.55 K/uL (12-30-21 @ 08:57)  Auto Neutrophil #: 1.52 K/uL (12-29-21 @ 16:38)    Auto Lymphocyte #: 0.26 K/uL (01-03-22 @ 09:03)  Auto Lymphocyte #: 0.52 K/uL (12-30-21 @ 08:57)  Auto Lymphocyte #: 0.57 K/uL (12-29-21 @ 16:38)    Procalcitonin, Serum: 0.12 ng/mL (12-30-21 @ 16:47)    Lactate, Blood: 1.6 mmol/L (12-29-21 @ 16:38)    Prothrombin Time, Plasma: 12.0 sec (12-29-21 @ 16:38)    Activated Partial Thromboplastin Time: 32.1 sec (12-29-21 @ 16:38)    C-Reactive Protein, Serum: 27 mg/L (12-31-21 @ 01:39)    MICROBIOLOGY: reviewed  RADIOLOGY & ADDITIONAL STUDIES: reviewed

## 2022-01-06 NOTE — PROGRESS NOTE ADULT - ASSESSMENT
89 yo M w/ PMHx of Parkinson's Disease (on no medications), CAD (s/p stent), Bladder cancer    covid  ckd  htn  OP  OA  eval for Dysphagia  hypoxemia  ? PNA     heme occult neg  on RA  procalcitonin - crp - noted  labs reviewed  PO intake  ID f/u noted  serum Na pending  s/p Zosyn    SLP eval noted  Parkinson's hx - risk for aspiration  Covid - hypoxemia - remdesivir and decadron  monitor VS and HD and Sat  serial D dimer  DVT p  assist with needs - ADL  isolation precs  assist with needs - ADL  GOC - DNR  prognosis guarded  old records reviewed

## 2022-01-06 NOTE — PROGRESS NOTE ADULT - SUBJECTIVE AND OBJECTIVE BOX
PCP  Subjective:   in bed , mostly non verbal    calm    Objective:   Vital Signs Last 24 Hrs  T(C): 36.9 (22 @ 04:49), Max: 36.9 (22 @ 21:07)  T(F): 98.5 (22 @ 04:49), Max: 98.5 (22 @ 04:49)  HR: 96 (22 @ 04:49) (96 - 104)  BP: 107/64 (22 @ 04:49) (107/64 - 111/61)  BP(mean): --  RR: 18 (22 @ 04:49) (18 - 20)  SpO2: 91% (22 @ 04:49) (91% - 93%)  Daily     Daily Weight in k.3 (2022 04:49)      GENERAL:  wdwn male ,weak ., awake ,  EYES: eomi  NECK: supple  CHEST/LUNG: rales , bilateral   HEART: s1 s2 regular  ABDOMEN:  soft nt + bs  EXTREMITIES:  no edema  SKIN:  warm , pale , weak   CNS:  awake , alert ,   non focal,  weak ,         Allergies: Allergies    clindamycin (Unknown)  Levaquin (Unknown)    Intolerances        Home Medications:  acetaminophen 325 mg oral tablet: 2 tab(s) orally every 6 hours, As needed, for pain (30 Dec 2021 10:54)  aspirin 81 mg oral tablet, chewable: 1 tab(s) orally once a day (30 Dec 2021 10:54)  heparin: 5000 unit(s) subcutaneous 2 times a day (2022 09:13)  Milk of Magnesia 8% oral suspension: 30 milliliter(s) orally once a day, As Needed if no bowel movement in 6 consecutive shifts (30 Dec 2021 10:54)  mirtazapine 15 mg oral tablet: 1 tab(s) orally once a day (at bedtime) (30 Dec 2021 10:54)  Multiple Vitamins oral tablet: 1 tab(s) orally once a day (2022 09:13)  tamsulosin 0.4 mg oral capsule: 1 cap(s) orally once a day (at bedtime) (30 Dec 2021 10:54)    Medications:   acetaminophen     Tablet .. 650 milliGRAM(s) Oral every 6 hours PRN  aspirin  chewable 81 milliGRAM(s) Oral daily  dexAMETHasone     Tablet 6 milliGRAM(s) Oral daily  dextrose 5% with potassium chloride 20 mEq/L 1000 milliLiter(s) IV Continuous <Continuous>  guaiFENesin Oral Liquid (Sugar-Free) 200 milliGRAM(s) Oral every 6 hours PRN  heparin   Injectable 5000 Unit(s) SubCutaneous every 8 hours  lactobacillus acidophilus 1 Tablet(s) Oral two times a day  multivitamin 1 Tablet(s) Oral daily  tamsulosin 0.4 milliGRAM(s) Oral at bedtime      LABS:        x   |  x   |  59<H>  ----------------------------<  110<H>  x    |  23  |  2.70<H>    Ca    7.9<L>      2022 07:59              CAPILLARY BLOOD GLUCOSE              RECENT CULTURES:

## 2022-01-06 NOTE — CHART NOTE - NSCHARTNOTEFT_GEN_A_CORE
Called by RN, pt right arm is swollen, site of IV.   Pt seen and examined at bedside. ROS cannot be obtained as pt is nonverbal.     T(F): 98.2 (01-06-22 @ 12:51), Max: 98.2 (01-06-22 @ 12:51)  HR: 95 (01-06-22 @ 12:51) (95 - 95)  BP: 109/70 (01-06-22 @ 12:51) (109/70 - 109/70)  RR: 18 (01-06-22 @ 12:51) (18 - 18)  SpO2: 90% (01-06-22 @ 12:51) (90% - 90%)    Physical Exam:  Gen: NAD  HEENT: PERRLA, moist mucous membranes  Cardio: +S1, +S2, RRR  Lungs: decreased BS BL, No w/r/r, no increased WOB   Abd: soft, NT/ND, +BS x 4 quadrants, no rebound/guarding   Ext: No pedal edema, no calf tenderness, pulses intact, right arm swelling     Assessment/Plan:  89yo M w/ PMHx of Parkinson's Disease (on no medications), CAD (s/p stent), Bladder cancer (s/p tumor resection) sent from [facility] for pna, lethargy, fevers. Admitted for COVID, sacral ulcer, and colitis,     - STAT RUE U/S to r/o DVT, pt is currently on heparin 5000 q8h for dvt ppx   - Encourage rest, ice, and elevation of the extremity   - Will continue to monitor, RN to call if any changes Called by RN, pt right hand is swollen. Pt has an IV on the RUE that is not being used.   Pt seen and examined at bedside. ROS cannot be obtained as pt is nonverbal.     T(F): 98.2 (01-06-22 @ 12:51), Max: 98.2 (01-06-22 @ 12:51)  HR: 95 (01-06-22 @ 12:51) (95 - 95)  BP: 109/70 (01-06-22 @ 12:51) (109/70 - 109/70)  RR: 18 (01-06-22 @ 12:51) (18 - 18)  SpO2: 90% (01-06-22 @ 12:51) (90% - 90%)    Physical Exam:  Gen: NAD  HEENT: PERRLA, moist mucous membranes  Cardio: +S1, +S2, RRR  Lungs: decreased BS BL, No w/r/r, no increased WOB   Abd: soft, NT/ND, +BS x 4 quadrants, no rebound/guarding   Ext: No pedal edema, no calf tenderness, pulses intact, right hand swelling with some ecchymosis     Assessment/Plan:  89yo M w/ PMHx of Parkinson's Disease (on no medications), CAD (s/p stent), Bladder cancer (s/p tumor resection) sent from [facility] for pna, lethargy, fevers. Admitted for COVID, sacral ulcer, and colitis.     - STAT RUE U/S to r/o DVT, pt is currently on heparin 5000 q8h for dvt ppx   - Encourage rest, ice, and elevation of the extremity   - Will continue to monitor, RN to call if any changes Called by RN, pt right hand is swollen. Pt has an IV on the RUE that is not being used.   Pt seen and examined at bedside. ROS cannot be obtained as pt is nonverbal.     T(F): 98.2 (01-06-22 @ 12:51), Max: 98.2 (01-06-22 @ 12:51)  HR: 95 (01-06-22 @ 12:51) (95 - 95)  BP: 109/70 (01-06-22 @ 12:51) (109/70 - 109/70)  RR: 18 (01-06-22 @ 12:51) (18 - 18)  SpO2: 90% (01-06-22 @ 12:51) (90% - 90%)    Physical Exam:  Gen: NAD  HEENT: PERRLA, moist mucous membranes  Cardio: +S1, +S2, RRR  Lungs: decreased BS BL, No w/r/r, no increased WOB   Abd: soft, NT/ND, +BS x 4 quadrants, no rebound/guarding   Ext: No pedal edema, no calf tenderness, pulses intact, right hand swelling with some ecchymosis     Assessment/Plan:  89yo M w/ PMHx of Parkinson's Disease (on no medications), CAD (s/p stent), Bladder cancer (s/p tumor resection) sent from [facility] for pna, lethargy, fevers. Admitted for COVID, sacral ulcer, and colitis.     - STAT RUE U/S to r/o DVT, pt is currently on heparin 5000 q8h for dvt ppx   - Encourage rest, ice, and elevation of the extremity   - Will continue to monitor, RN to call if any changes      ---  RUE U/S - negative for DVT  - Will continue to monitor, RN to call if any changes

## 2022-01-06 NOTE — PROGRESS NOTE ADULT - SUBJECTIVE AND OBJECTIVE BOX
Patient is a 89y old  Male who presents with a chief complaint of fever    weakness (05 Jan 2022 10:14)    Patient seen in follow up for hypernatremia, SMITH.        PAST MEDICAL HISTORY:  Shoulder fracture    Bladder cancer    Stented coronary artery    Parkinsons      MEDICATIONS  (STANDING):  aspirin  chewable 81 milliGRAM(s) Oral daily  dexAMETHasone     Tablet 6 milliGRAM(s) Oral daily  dextrose 5% with potassium chloride 20 mEq/L 1000 milliLiter(s) (80 mL/Hr) IV Continuous <Continuous>  heparin   Injectable 5000 Unit(s) SubCutaneous every 8 hours  lactobacillus acidophilus 1 Tablet(s) Oral two times a day  multivitamin 1 Tablet(s) Oral daily  potassium chloride  10 mEq/100 mL IVPB 10 milliEquivalent(s) IV Intermittent every 1 hour  tamsulosin 0.4 milliGRAM(s) Oral at bedtime    MEDICATIONS  (PRN):  acetaminophen     Tablet .. 650 milliGRAM(s) Oral every 6 hours PRN Temp greater or equal to 38C (100.4F), Mild Pain (1 - 3)  guaiFENesin Oral Liquid (Sugar-Free) 200 milliGRAM(s) Oral every 6 hours PRN Cough    T(C): 36.8 (01-06-22 @ 12:51), Max: 37.3 (01-04-22 @ 21:04)  HR: 95 (01-06-22 @ 12:51) (93 - 104)  BP: 109/70 (01-06-22 @ 12:51) (102/59 - 128/55)  RR: 18 (01-06-22 @ 12:51)  SpO2: 90% (01-06-22 @ 12:51)  Wt(kg): --  I&O's Detail    05 Jan 2022 07:01  -  06 Jan 2022 07:00  --------------------------------------------------------  IN:    dextrose 5% with potassium chloride 20 mEq/L: 1040 mL  Total IN: 1040 mL    OUT:    Incontinent per Condom Catheter (mL): 400 mL    Voided (mL): 500 mL  Total OUT: 900 mL    Total NET: 140 mL      06 Jan 2022 07:01  -  06 Jan 2022 15:12  --------------------------------------------------------  IN:  Total IN: 0 mL    OUT:    Incontinent per Condom Catheter (mL): 1400 mL  Total OUT: 1400 mL    Total NET: -1400 mL          PHYSICAL EXAM:  General: No distress  Respiratory: b/l air entry  Cardiovascular: S1 S2  Gastrointestinal: soft  Extremities:  no edema            LABORATORY:    01-06    148<H>  |  117<H>  |  59<H>  ----------------------------<  110<H>  3.1<L>   |  23  |  2.70<H>    Ca    7.9<L>      06 Jan 2022 07:59      Sodium, Serum: 148 mmol/L (01-06 @ 07:59)    Potassium, Serum: 3.1 mmol/L (01-06 @ 07:59)      Creatinine, Serum 2.70 (01-06 @ 07:59)  Creatinine, Serum 3.40 (01-04 @ 08:39)

## 2022-01-06 NOTE — GOALS OF CARE CONVERSATION - ADVANCED CARE PLANNING - SURROGATE NAME
Keyla Enrique Niacinamide Counseling: I recommended taking niacin or niacinamide, also know as vitamin B3, twice daily. Recent evidence suggests that taking vitamin B3 (500 mg twice daily) can reduce the risk of actinic keratoses and non-melanoma skin cancers. Side effects of vitamin B3 include flushing and headache.

## 2022-01-06 NOTE — PROGRESS NOTE ADULT - ASSESSMENT
89yo M w/ PMHx of Parkinson's Disease (on no medications), CAD (s/p stent), Bladder cancer (s/p tumor resection) sent from [facility] for pna, lethargy, fevers.  pt currently on ABx cefepime, doxy, flagyl for sacral ulcer and colitis, has xr showing bl pna. sent to ed  pt does not provide history    COVID-19 PNA +/- superimposed bacterial PNA  AHRF on O2, now on RA  Fevers, resolved   Leukopenia, resolved   COVID-19-high risk for decompensation EARLY INFLAMMATORY PHASE (7-14 days post symptom onset)   -s/p remdesivir x5 day course completed 1/3  -c/w steroids x10 day course end 1/8  -infectious w/u negative to date  -imaging reviewed--CXR basilar consolidations  -fevers resolved, likely course of disease, afebrile  -maintain aspiration precautions  -isolation precautions per infection control policies  -s/p pip-tazo for possible superimposed bacterial PNA - completed 7 day course 1/4    Colitis/sacral ulcer  -s/p zosyn as above, no need to c/w abx from NH    Stable from ID standpoint.   Discharge planning per primary team.     Maico Gomez M.D.  [facility], Division of Infectious Diseases  [phone]  After 5pm on weekdays and all day on weekends - please call [phone] 89yo M w/ PMHx of Parkinson's Disease (on no medications), CAD (s/p stent), Bladder cancer (s/p tumor resection) sent from [facility] for pna, lethargy, fevers.  pt currently on ABx cefepime, doxy, flagyl for sacral ulcer and colitis, has xr showing bl pna. sent to ed  pt does not provide history    COVID-19 PNA +/- superimposed bacterial PNA  AHRF on O2, now on RA  Fevers, resolved   Leukopenia, resolved   COVID-19-high risk for decompensation EARLY INFLAMMATORY PHASE (7-14 days post symptom onset)   -s/p remdesivir x5 day course completed 1/3  -c/w steroids x10 day course end 1/8  -infectious w/u negative to date  -imaging reviewed--CXR basilar consolidations  -fevers resolved, likely course of disease, afebrile  -maintain aspiration precautions  -isolation precautions per infection control policies  -s/p pip-tazo for possible superimposed bacterial PNA - completed 7 day course 1/4    Colitis/sacral ulcer  -s/p zosyn as above, no need to c/w abx from NH    Stable from ID standpoint.   Discharge planning per primary team.   ID will sign off at this time but remains available for any further questions/concerns.    Maico Gomez M.D.  [facility], Division of Infectious Diseases  [phone]  After 5pm on weekdays and all day on weekends - please call [phone]

## 2022-01-07 LAB — MAGNESIUM SERPL-MCNC: 1.9 MG/DL — SIGNIFICANT CHANGE UP (ref 1.6–2.6)

## 2022-01-07 RX ADMIN — TAMSULOSIN HYDROCHLORIDE 0.4 MILLIGRAM(S): 0.4 CAPSULE ORAL at 21:30

## 2022-01-07 RX ADMIN — HEPARIN SODIUM 5000 UNIT(S): 5000 INJECTION INTRAVENOUS; SUBCUTANEOUS at 05:15

## 2022-01-07 RX ADMIN — DEXTROSE MONOHYDRATE, SODIUM CHLORIDE, AND POTASSIUM CHLORIDE 50 MILLILITER(S): 50; .745; 4.5 INJECTION, SOLUTION INTRAVENOUS at 21:30

## 2022-01-07 RX ADMIN — Medication 1 TABLET(S): at 12:09

## 2022-01-07 RX ADMIN — Medication 1 TABLET(S): at 17:29

## 2022-01-07 RX ADMIN — HEPARIN SODIUM 5000 UNIT(S): 5000 INJECTION INTRAVENOUS; SUBCUTANEOUS at 14:30

## 2022-01-07 RX ADMIN — Medication 81 MILLIGRAM(S): at 12:09

## 2022-01-07 RX ADMIN — HEPARIN SODIUM 5000 UNIT(S): 5000 INJECTION INTRAVENOUS; SUBCUTANEOUS at 21:30

## 2022-01-07 RX ADMIN — Medication 1 TABLET(S): at 05:16

## 2022-01-07 RX ADMIN — Medication 6 MILLIGRAM(S): at 05:16

## 2022-01-07 NOTE — PROGRESS NOTE ADULT - SUBJECTIVE AND OBJECTIVE BOX
Date/Time Patient Seen:  		  Referring MD:   Data Reviewed	       Patient is a 89y old  Male who presents with a chief complaint of fever    weakness (06 Jan 2022 15:09)      Subjective/HPI     PAST MEDICAL & SURGICAL HISTORY:  Shoulder fracture    Bladder cancer    Stented coronary artery  over 15 years ago as per patient    Parkinsons    S/P cardiac catheterization          Medication list         MEDICATIONS  (STANDING):  aspirin  chewable 81 milliGRAM(s) Oral daily  dexAMETHasone     Tablet 6 milliGRAM(s) Oral daily  dextrose 5% with potassium chloride 20 mEq/L 1000 milliLiter(s) (80 mL/Hr) IV Continuous <Continuous>  heparin   Injectable 5000 Unit(s) SubCutaneous every 8 hours  lactobacillus acidophilus 1 Tablet(s) Oral two times a day  multivitamin 1 Tablet(s) Oral daily  tamsulosin 0.4 milliGRAM(s) Oral at bedtime    MEDICATIONS  (PRN):  acetaminophen     Tablet .. 650 milliGRAM(s) Oral every 6 hours PRN Temp greater or equal to 38C (100.4F), Mild Pain (1 - 3)  guaiFENesin Oral Liquid (Sugar-Free) 200 milliGRAM(s) Oral every 6 hours PRN Cough         Vitals log        ICU Vital Signs Last 24 Hrs  T(C): 36.6 (07 Jan 2022 05:06), Max: 36.8 (06 Jan 2022 12:51)  T(F): 97.9 (07 Jan 2022 05:06), Max: 98.2 (06 Jan 2022 12:51)  HR: 87 (07 Jan 2022 05:06) (87 - 105)  BP: 118/69 (07 Jan 2022 05:06) (109/70 - 129/75)  BP(mean): --  ABP: --  ABP(mean): --  RR: 18 (07 Jan 2022 05:06) (18 - 18)  SpO2: 92% (07 Jan 2022 05:06) (90% - 92%)           Input and Output:  I&O's Detail    05 Jan 2022 07:01  -  06 Jan 2022 07:00  --------------------------------------------------------  IN:    dextrose 5% with potassium chloride 20 mEq/L: 1040 mL  Total IN: 1040 mL    OUT:    Incontinent per Condom Catheter (mL): 400 mL    Voided (mL): 500 mL  Total OUT: 900 mL    Total NET: 140 mL      06 Jan 2022 07:01  -  07 Jan 2022 05:14  --------------------------------------------------------  IN:    dextrose 5% with potassium chloride 20 mEq/L: 720 mL    IV PiggyBack: 300 mL  Total IN: 1020 mL    OUT:    Incontinent per Condom Catheter (mL): 1750 mL  Total OUT: 1750 mL    Total NET: -730 mL          Lab Data    01-06    148<H>  |  117<H>  |  59<H>  ----------------------------<  110<H>  3.1<L>   |  23  |  2.70<H>    Ca    7.9<L>      06 Jan 2022 07:59              Review of Systems	      Objective     Physical Examination    heart s1s2  lung dec BS  abd soft  frail  verbal    Pertinent Lab findings & Imaging      Lalo:  NO   Adequate UO     I&O's Detail    05 Jan 2022 07:01  -  06 Jan 2022 07:00  --------------------------------------------------------  IN:    dextrose 5% with potassium chloride 20 mEq/L: 1040 mL  Total IN: 1040 mL    OUT:    Incontinent per Condom Catheter (mL): 400 mL    Voided (mL): 500 mL  Total OUT: 900 mL    Total NET: 140 mL      06 Jan 2022 07:01  -  07 Jan 2022 05:14  --------------------------------------------------------  IN:    dextrose 5% with potassium chloride 20 mEq/L: 720 mL    IV PiggyBack: 300 mL  Total IN: 1020 mL    OUT:    Incontinent per Condom Catheter (mL): 1750 mL  Total OUT: 1750 mL    Total NET: -730 mL               Discussed with:     Cultures:	        Radiology

## 2022-01-07 NOTE — PROGRESS NOTE ADULT - ASSESSMENT
89 yo M w/ PMHx of Parkinson's Disease (on no medications), CAD (s/p stent), Bladder cancer    covid  ckd  htn  OP  OA  eval for Dysphagia  hypoxemia  ? PNA     RUE - neg for DVT  heme occult neg  on RA  procalcitonin - crp - noted  labs reviewed  PO intake  ID f/u noted  serum Na monitoring - on IVF - monitor for vol overload  s/p Zosyn    SLP eval noted  Parkinson's hx - risk for aspiration  Covid - hypoxemia - remdesivir and decadron  monitor VS and HD and Sat  serial D dimer  DVT p  assist with needs - ADL  isolation precs  assist with needs - ADL  GOC - DNR  prognosis guarded  old records reviewed

## 2022-01-07 NOTE — PROGRESS NOTE ADULT - SUBJECTIVE AND OBJECTIVE BOX
Patient is a 89y old  Male who presents with a chief complaint of fever    weakness (05 Jan 2022 10:14)    Patient seen in follow up for hypernatremia, SMITH.        PAST MEDICAL HISTORY:  Shoulder fracture    Bladder cancer    Stented coronary artery    Parkinsons      MEDICATIONS  (STANDING):  aspirin  chewable 81 milliGRAM(s) Oral daily  dexAMETHasone     Tablet 6 milliGRAM(s) Oral daily  dextrose 5% with potassium chloride 20 mEq/L 1000 milliLiter(s) (80 mL/Hr) IV Continuous <Continuous>  heparin   Injectable 5000 Unit(s) SubCutaneous every 8 hours  lactobacillus acidophilus 1 Tablet(s) Oral two times a day  multivitamin 1 Tablet(s) Oral daily  tamsulosin 0.4 milliGRAM(s) Oral at bedtime    MEDICATIONS  (PRN):  acetaminophen     Tablet .. 650 milliGRAM(s) Oral every 6 hours PRN Temp greater or equal to 38C (100.4F), Mild Pain (1 - 3)  guaiFENesin Oral Liquid (Sugar-Free) 200 milliGRAM(s) Oral every 6 hours PRN Cough    T(C): 36.3 (01-07-22 @ 13:13), Max: 36.9 (01-05-22 @ 21:07)  HR: 99 (01-07-22 @ 13:13) (87 - 105)  BP: 110/65 (01-07-22 @ 13:13) (107/64 - 129/75)  RR: 20 (01-07-22 @ 13:13)  SpO2: 91% (01-07-22 @ 13:13)  Wt(kg): --  I&O's Detail    06 Jan 2022 07:01  -  07 Jan 2022 07:00  --------------------------------------------------------  IN:    dextrose 5% with potassium chloride 20 mEq/L: 960 mL    IV PiggyBack: 300 mL  Total IN: 1260 mL    OUT:    Incontinent per Condom Catheter (mL): 1750 mL    Voided (mL): 750 mL  Total OUT: 2500 mL    Total NET: -1240 mL      07 Jan 2022 07:01  -  07 Jan 2022 14:58  --------------------------------------------------------  IN:  Total IN: 0 mL    OUT:    Incontinent per Condom Catheter (mL): 850 mL  Total OUT: 850 mL    Total NET: -850 mL            PHYSICAL EXAM:  General: No distress  Respiratory: b/l air entry  Cardiovascular: S1 S2  Gastrointestinal: soft  Extremities:  no edema            LABORATORY:    01-06    148<H>  |  117<H>  |  59<H>  ----------------------------<  110<H>  3.1<L>   |  23  |  2.70<H>    Ca    7.9<L>      06 Jan 2022 07:59  Mg     1.9     01-07      Sodium, Serum: 148 mmol/L (01-06 @ 07:59)    Potassium, Serum: 3.1 mmol/L (01-06 @ 07:59)      Creatinine, Serum 2.70 (01-06 @ 07:59)

## 2022-01-07 NOTE — PROGRESS NOTE ADULT - SUBJECTIVE AND OBJECTIVE BOX
PCP  Subjective:   in bed    Objective:   Vital Signs Last 24 Hrs  T(C): 36.6 (22 @ 05:06), Max: 36.8 (22 @ 12:51)  T(F): 97.9 (22 @ 05:06), Max: 98.2 (22 @ 12:51)  HR: 87 (22 @ 05:06) (87 - 105)  BP: 118/69 (22 @ 05:06) (109/70 - 129/75)  BP(mean): --  RR: 18 (22 @ 05:06) (18 - 18)  SpO2: 92% (22 @ 05:06) (90% - 92%)  Daily     Daily Weight in k.4 (2022 05:06)    GENERAL:  wdwn male ,weak ., awake ,  EYES: eomi  NECK: supple  CHEST/LUNG: rales , bilateral   HEART: s1 s2 regular  ABDOMEN:  soft nt + bs  EXTREMITIES:  no edema  SKIN:  warm , pale , weak   CNS:  awake , alert ,   non focal,  weak ,         Allergies: Allergies    clindamycin (Unknown)  Levaquin (Unknown)    Intolerances        Home Medications:  acetaminophen 325 mg oral tablet: 2 tab(s) orally every 6 hours, As needed, for pain (30 Dec 2021 10:54)  aspirin 81 mg oral tablet, chewable: 1 tab(s) orally once a day (30 Dec 2021 10:54)  heparin: 5000 unit(s) subcutaneous 2 times a day (2022 09:13)  Milk of Magnesia 8% oral suspension: 30 milliliter(s) orally once a day, As Needed if no bowel movement in 6 consecutive shifts (30 Dec 2021 10:54)  mirtazapine 15 mg oral tablet: 1 tab(s) orally once a day (at bedtime) (30 Dec 2021 10:54)  Multiple Vitamins oral tablet: 1 tab(s) orally once a day (2022 09:13)  tamsulosin 0.4 mg oral capsule: 1 cap(s) orally once a day (at bedtime) (30 Dec 2021 10:54)    Medications:   acetaminophen     Tablet .. 650 milliGRAM(s) Oral every 6 hours PRN  aspirin  chewable 81 milliGRAM(s) Oral daily  dexAMETHasone     Tablet 6 milliGRAM(s) Oral daily  dextrose 5% with potassium chloride 20 mEq/L 1000 milliLiter(s) IV Continuous <Continuous>  guaiFENesin Oral Liquid (Sugar-Free) 200 milliGRAM(s) Oral every 6 hours PRN  heparin   Injectable 5000 Unit(s) SubCutaneous every 8 hours  lactobacillus acidophilus 1 Tablet(s) Oral two times a day  multivitamin 1 Tablet(s) Oral daily  tamsulosin 0.4 milliGRAM(s) Oral at bedtime      LABS:        148<H>  |  117<H>  |  59<H>  ----------------------------<  110<H>  3.1<L>   |  23  |  2.70<H>    Ca    7.9<L>      2022 07:59  Mg     1.9                   CAPILLARY BLOOD GLUCOSE              RECENT CULTURES:

## 2022-01-07 NOTE — PROGRESS NOTE ADULT - ASSESSMENT
1.  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  Hypokalemia  3.  Hypernatremia    Recheck am labs. Improving sodium levels and renal indices. To continue hypotonic fluid infusion. Potassium supplementation. Encourage PO intake as tolerated. Renal sonogram from 11/2021 results noted. Will follow electrolytes and renal function trend.

## 2022-01-07 NOTE — PROGRESS NOTE ADULT - ASSESSMENT
Chief Complaint: shortness of breath.    · Chief Complaint: The patient is a 89y Male complaining of shortness of breath.  · HPI Objective Statement: 89yo M w/ PMHx of Parkinson's Disease (on no medications), CAD (s/p stent), Bladder cancer (s/p tumor resection) sent from [facility] for pna, lethargy, fevers.  pt currently on ABx cefepime, doxy, flagyl for sacral ulcer and colitis, has xr showing bl pna. sent to ed  pt does not provide history      PAST MEDICAL/SURGICAL/FAMILY/SOCIAL HISTORY:    Past Medical, Past Surgical, and Family History:  PAST MEDICAL HISTORY:  Bladder cancer ,Parkinsons ,Shoulder fracture ,Stented coronary artery over 15 years ago as per patient.     PAST SURGICAL HISTORY:  S/P cardiac catheterization.     on 1.2.2022 plan as per ID:  COVID-19 PNA +/- superimposed bacterial PNA  AHRF on O2  Fevers  Leukopenia  COVID-19-high risk for decompensation EARLY INFLAMMATORY PHASE (7-14 days post symptom onset)   -c/w remdesivir x5 day course  -c/w steroids x10 day course  -infectious w/u negative to date  -imaging reviewed--CXR basilar consolidations  -trend biomarkers  -trend temps/WBC  --fevers noted, likely course of disease, will c/w monitoring  -maintain aspiration precautions  -isolation precautions per infection control policies  -Would c/w zosyn for possible superimposed bacterial PNA---x5-7 day course pending clinical improvement    Colitis/sacral ulcer  -on zosyn as above, no need to c/w abx from NH    Infectious Diseases will continue to follow. Please call with any questions.

## 2022-01-08 LAB
ANION GAP SERPL CALC-SCNC: 7 MMOL/L — SIGNIFICANT CHANGE UP (ref 5–17)
BUN SERPL-MCNC: 44 MG/DL — HIGH (ref 7–23)
CALCIUM SERPL-MCNC: 7.6 MG/DL — LOW (ref 8.5–10.1)
CHLORIDE SERPL-SCNC: 112 MMOL/L — HIGH (ref 96–108)
CO2 SERPL-SCNC: 21 MMOL/L — LOW (ref 22–31)
CREAT SERPL-MCNC: 2.1 MG/DL — HIGH (ref 0.5–1.3)
GLUCOSE SERPL-MCNC: 93 MG/DL — SIGNIFICANT CHANGE UP (ref 70–99)
HCT VFR BLD CALC: 24.3 % — LOW (ref 39–50)
HGB BLD-MCNC: 8.3 G/DL — LOW (ref 13–17)
MCHC RBC-ENTMCNC: 30.6 PG — SIGNIFICANT CHANGE UP (ref 27–34)
MCHC RBC-ENTMCNC: 34.2 GM/DL — SIGNIFICANT CHANGE UP (ref 32–36)
MCV RBC AUTO: 89.7 FL — SIGNIFICANT CHANGE UP (ref 80–100)
NRBC # BLD: 0 /100 WBCS — SIGNIFICANT CHANGE UP (ref 0–0)
PLATELET # BLD AUTO: 195 K/UL — SIGNIFICANT CHANGE UP (ref 150–400)
POTASSIUM SERPL-MCNC: 4.2 MMOL/L — SIGNIFICANT CHANGE UP (ref 3.5–5.3)
POTASSIUM SERPL-SCNC: 4.2 MMOL/L — SIGNIFICANT CHANGE UP (ref 3.5–5.3)
RBC # BLD: 2.71 M/UL — LOW (ref 4.2–5.8)
RBC # FLD: 20.6 % — HIGH (ref 10.3–14.5)
SODIUM SERPL-SCNC: 140 MMOL/L — SIGNIFICANT CHANGE UP (ref 135–145)
WBC # BLD: 10.59 K/UL — HIGH (ref 3.8–10.5)
WBC # FLD AUTO: 10.59 K/UL — HIGH (ref 3.8–10.5)

## 2022-01-08 RX ADMIN — Medication 6 MILLIGRAM(S): at 05:38

## 2022-01-08 RX ADMIN — Medication 1 TABLET(S): at 05:38

## 2022-01-08 RX ADMIN — TAMSULOSIN HYDROCHLORIDE 0.4 MILLIGRAM(S): 0.4 CAPSULE ORAL at 21:24

## 2022-01-08 RX ADMIN — Medication 81 MILLIGRAM(S): at 11:18

## 2022-01-08 RX ADMIN — Medication 1 TABLET(S): at 11:18

## 2022-01-08 RX ADMIN — Medication 1 TABLET(S): at 17:12

## 2022-01-08 RX ADMIN — HEPARIN SODIUM 5000 UNIT(S): 5000 INJECTION INTRAVENOUS; SUBCUTANEOUS at 05:38

## 2022-01-08 RX ADMIN — HEPARIN SODIUM 5000 UNIT(S): 5000 INJECTION INTRAVENOUS; SUBCUTANEOUS at 13:08

## 2022-01-08 RX ADMIN — HEPARIN SODIUM 5000 UNIT(S): 5000 INJECTION INTRAVENOUS; SUBCUTANEOUS at 21:24

## 2022-01-08 NOTE — PROGRESS NOTE ADULT - SUBJECTIVE AND OBJECTIVE BOX
Subjective: O2 Sat 97% on NC O2.       MEDICATIONS  (STANDING):  aspirin  chewable 81 milliGRAM(s) Oral daily  dexAMETHasone     Tablet 6 milliGRAM(s) Oral daily  dextrose 5% with potassium chloride 20 mEq/L 1000 milliLiter(s) (50 mL/Hr) IV Continuous <Continuous>  heparin   Injectable 5000 Unit(s) SubCutaneous every 8 hours  lactobacillus acidophilus 1 Tablet(s) Oral two times a day  multivitamin 1 Tablet(s) Oral daily  tamsulosin 0.4 milliGRAM(s) Oral at bedtime    MEDICATIONS  (PRN):  acetaminophen     Tablet .. 650 milliGRAM(s) Oral every 6 hours PRN Temp greater or equal to 38C (100.4F), Mild Pain (1 - 3)  guaiFENesin Oral Liquid (Sugar-Free) 200 milliGRAM(s) Oral every 6 hours PRN Cough          T(C): 36.2 (01-08-22 @ 12:39), Max: 36.8 (01-08-22 @ 04:38)  HR: 68 (01-08-22 @ 12:39) (68 - 85)  BP: 129/76 (01-08-22 @ 12:39) (115/70 - 129/76)  RR: 20 (01-08-22 @ 12:39) (18 - 20)  SpO2: 97% (01-08-22 @ 12:39) (91% - 97%)  Wt(kg): --        I&O's Detail    07 Jan 2022 07:01  -  08 Jan 2022 07:00  --------------------------------------------------------  IN:  Total IN: 0 mL    OUT:    Incontinent per Condom Catheter (mL): 2300 mL  Total OUT: 2300 mL    Total NET: -2300 mL               PHYSICAL EXAM:    GENERAL: no dyspnea  NECK: Supple, no inc in JVP  CHEST/LUNG: Clear  HEART: S1S2  ABDOMEN: Soft  EXTREMITIES: no edema      LABS:  CBC Full  -  ( 08 Jan 2022 07:13 )  WBC Count : 10.59 K/uL  RBC Count : 2.71 M/uL  Hemoglobin : 8.3 g/dL  Hematocrit : 24.3 %  Platelet Count - Automated : 195 K/uL  Mean Cell Volume : 89.7 fl  Mean Cell Hemoglobin : 30.6 pg  Mean Cell Hemoglobin Concentration : 34.2 gm/dL  Auto Neutrophil # : x  Auto Lymphocyte # : x  Auto Monocyte # : x  Auto Eosinophil # : x  Auto Basophil # : x  Auto Neutrophil % : x  Auto Lymphocyte % : x  Auto Monocyte % : x  Auto Eosinophil % : x  Auto Basophil % : x    01-08    140  |  112<H>  |  44<H>  ----------------------------<  93  4.2   |  21<L>  |  2.10<H>    Ca    7.6<L>      08 Jan 2022 07:13  Mg     1.9     01-07          ASSESSMENT:  1.  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 Pre-renal. Improved.   2.  Hypokalemia, resolved  3.  Hypernatremia, corrected.   4. COVID 19    PLAN:  D/c D5W with KCL

## 2022-01-08 NOTE — CHART NOTE - NSCHARTNOTEFT_GEN_A_CORE
Assessment:   Pt seen for nutrition follow up.  Chart reviewed, hospital course noted.     Brief History: 89yo M w/ PMHx of Parkinson's Disease (on no medications), CAD (s/p stent), Bladder cancer (s/p tumor resection) sent from [facility] for pna, lethargy, fevers.  pt currently on ABx cefepime, doxy, flagyl for sacral ulcer and colitis, has xr showing bl pna.     Swallow eval 12/31 recommended puree diet with moderately thick liquids via tsp.  PO intake very poor.  Fecal incontinence noted 1/7,8.    Renal indices improving.   Wife consented to DNR/DNI.    Factors impacting intake: [ ] none [ ] nausea  [ ] vomiting [ ] diarrhea [ ] constipation  [x]chewing problems [x] swallowing issues  [x] other: confusion    Diet Prescription: Diet, Pureed (01-05-22 @ 09:06)    Intake: poor    Current Weight: 1/7 115.5#      Pertinent Medications: MEDICATIONS  (STANDING):  aspirin  chewable 81 milliGRAM(s) Oral daily  dexAMETHasone     Tablet 6 milliGRAM(s) Oral daily  dextrose 5% with potassium chloride 20 mEq/L 1000 milliLiter(s) (50 mL/Hr) IV Continuous <Continuous>  heparin   Injectable 5000 Unit(s) SubCutaneous every 8 hours  lactobacillus acidophilus 1 Tablet(s) Oral two times a day  multivitamin 1 Tablet(s) Oral daily  tamsulosin 0.4 milliGRAM(s) Oral at bedtime    MEDICATIONS  (PRN):  acetaminophen     Tablet .. 650 milliGRAM(s) Oral every 6 hours PRN Temp greater or equal to 38C (100.4F), Mild Pain (1 - 3)  guaiFENesin Oral Liquid (Sugar-Free) 200 milliGRAM(s) Oral every 6 hours PRN Cough    Pertinent Labs: 01-08 Na140 mmol/L Glu 93 mg/dL K+ 4.2 mmol/L Cr  2.10 mg/dL<H> BUN 44 mg/dL<H>      Skin:  Heel I, sacrum I, unstaged  Edema: none noted    Estimated Needs:   [ ] no change since previous assessment on: 12/31 based on #  [ ] recalculated: based on   kcal/day:  gms protein/day:    Previous Nutrition Diagnosis:     [x] Increased Nutrient Needs  [x]  Malnutrition     Nutrition Diagnosis is [x] ongoing  [ ] resolved [ ] not applicable     New Nutrition Diagnosis: [x] Swallowing Difficulty related to motor causes evidenced by swallow eval 12/31      Interventions:   Recommend  [ ] Continue current diet  [x] Change Diet To:  low Na puree with moderately thick liquids via tsp  [ ] Nutrition Supplement  [ ] Nutrition Support  [x] Other:  Ensure Enlive bid (thickenend), Vit C 500mg bid    Monitoring and Evaluation:   [x] PO intake [ x ] Tolerance to diet prescription [ x ] weights [ x ] labs [ x ] follow up per protocol  [x] other: s/s GI distress, bowel function, skin integrity/ edema Assessment:   Pt seen for nutrition follow up.  Chart reviewed, hospital course noted.     Brief History: 89yo M w/ PMHx of Parkinson's Disease (on no medications), CAD (s/p stent), Bladder cancer (s/p tumor resection) sent from [facility] for pna, lethargy, fevers.  pt currently on ABx cefepime, doxy, flagyl for sacral ulcer and colitis, has xr showing bl pna.     Swallow eval 12/31 recommended puree diet with moderately thick liquids via tsp.  PO intake very poor.  Fecal incontinence noted 1/7,8.    Renal indices improving.   Wife consented to DNR/DNI, however wishes regarding Tf not noted.     Factors impacting intake: [ ] none [ ] nausea  [ ] vomiting [ ] diarrhea [ ] constipation  [x]chewing problems [x] swallowing issues  [x] other: confusion    Diet Prescription: Diet, Pureed (01-05-22 @ 09:06)    Intake: poor    Current Weight: 1/7 115.5#      Pertinent Medications: MEDICATIONS  (STANDING):  aspirin  chewable 81 milliGRAM(s) Oral daily  dexAMETHasone     Tablet 6 milliGRAM(s) Oral daily  dextrose 5% with potassium chloride 20 mEq/L 1000 milliLiter(s) (50 mL/Hr) IV Continuous <Continuous>  heparin   Injectable 5000 Unit(s) SubCutaneous every 8 hours  lactobacillus acidophilus 1 Tablet(s) Oral two times a day  multivitamin 1 Tablet(s) Oral daily  tamsulosin 0.4 milliGRAM(s) Oral at bedtime    MEDICATIONS  (PRN):  acetaminophen     Tablet .. 650 milliGRAM(s) Oral every 6 hours PRN Temp greater or equal to 38C (100.4F), Mild Pain (1 - 3)  guaiFENesin Oral Liquid (Sugar-Free) 200 milliGRAM(s) Oral every 6 hours PRN Cough    Pertinent Labs: 01-08 Na140 mmol/L Glu 93 mg/dL K+ 4.2 mmol/L Cr  2.10 mg/dL<H> BUN 44 mg/dL<H>      Skin:  Heel I, sacrum I, unstaged  Edema: none noted    Estimated Needs:   [ ] no change since previous assessment on: 12/31 based on #  [ ] recalculated: based on   kcal/day:  gms protein/day:    Previous Nutrition Diagnosis:     [x] Increased Nutrient Needs  [x]  Malnutrition     Nutrition Diagnosis is [x] ongoing  [ ] resolved [ ] not applicable     New Nutrition Diagnosis: [x] Swallowing Difficulty related to motor causes evidenced by swallow eval 12/31      Interventions:   Recommend  [ ] Continue current diet  [x] Change Diet To:  low Na puree with moderately thick liquids via tsp  [ ] Nutrition Supplement  [ ] Nutrition Support  [x] Other:  Ensure Enlive bid (thickenend), Vit C 500mg bid.  Provide fortified hot cereal qam.    Monitoring and Evaluation:   [x] PO intake [ x ] Tolerance to diet prescription [ x ] weights [ x ] labs [ x ] follow up per protocol  [x] other: s/s GI distress, bowel function, skin integrity/ edema

## 2022-01-08 NOTE — PROGRESS NOTE ADULT - SUBJECTIVE AND OBJECTIVE BOX
Date/Time Patient Seen:  		  Referring MD:   Data Reviewed	       Patient is a 89y old  Male who presents with a chief complaint of fever    weakness (07 Jan 2022 14:57)      Subjective/HPI     PAST MEDICAL & SURGICAL HISTORY:  Shoulder fracture    Bladder cancer    Stented coronary artery  over 15 years ago as per patient    Parkinsons    S/P cardiac catheterization          Medication list         MEDICATIONS  (STANDING):  aspirin  chewable 81 milliGRAM(s) Oral daily  dexAMETHasone     Tablet 6 milliGRAM(s) Oral daily  dextrose 5% with potassium chloride 20 mEq/L 1000 milliLiter(s) (50 mL/Hr) IV Continuous <Continuous>  heparin   Injectable 5000 Unit(s) SubCutaneous every 8 hours  lactobacillus acidophilus 1 Tablet(s) Oral two times a day  multivitamin 1 Tablet(s) Oral daily  tamsulosin 0.4 milliGRAM(s) Oral at bedtime    MEDICATIONS  (PRN):  acetaminophen     Tablet .. 650 milliGRAM(s) Oral every 6 hours PRN Temp greater or equal to 38C (100.4F), Mild Pain (1 - 3)  guaiFENesin Oral Liquid (Sugar-Free) 200 milliGRAM(s) Oral every 6 hours PRN Cough         Vitals log        ICU Vital Signs Last 24 Hrs  T(C): 36.8 (08 Jan 2022 04:38), Max: 36.8 (08 Jan 2022 04:38)  T(F): 98.2 (08 Jan 2022 04:38), Max: 98.2 (08 Jan 2022 04:38)  HR: 81 (08 Jan 2022 04:38) (81 - 99)  BP: 126/77 (08 Jan 2022 04:38) (110/65 - 126/77)  BP(mean): --  ABP: --  ABP(mean): --  RR: 18 (08 Jan 2022 04:38) (18 - 20)  SpO2: 91% (08 Jan 2022 04:38) (91% - 91%)           Input and Output:  I&O's Detail    06 Jan 2022 07:01  -  07 Jan 2022 07:00  --------------------------------------------------------  IN:    dextrose 5% with potassium chloride 20 mEq/L: 960 mL    IV PiggyBack: 300 mL  Total IN: 1260 mL    OUT:    Incontinent per Condom Catheter (mL): 1750 mL    Voided (mL): 750 mL  Total OUT: 2500 mL    Total NET: -1240 mL      07 Jan 2022 07:01  -  08 Jan 2022 05:29  --------------------------------------------------------  IN:  Total IN: 0 mL    OUT:    Incontinent per Condom Catheter (mL): 1800 mL  Total OUT: 1800 mL    Total NET: -1800 mL          Lab Data    01-06    148<H>  |  117<H>  |  59<H>  ----------------------------<  110<H>  3.1<L>   |  23  |  2.70<H>    Ca    7.9<L>      06 Jan 2022 07:59  Mg     1.9     01-07              Review of Systems	      Objective     Physical Examination  heart s1s2  lung dec BS  abd soft  head nc        Pertinent Lab findings & Imaging      Lalo:  NO   Adequate UO     I&O's Detail    06 Jan 2022 07:01  -  07 Jan 2022 07:00  --------------------------------------------------------  IN:    dextrose 5% with potassium chloride 20 mEq/L: 960 mL    IV PiggyBack: 300 mL  Total IN: 1260 mL    OUT:    Incontinent per Condom Catheter (mL): 1750 mL    Voided (mL): 750 mL  Total OUT: 2500 mL    Total NET: -1240 mL      07 Jan 2022 07:01 - 08 Jan 2022 05:29  --------------------------------------------------------  IN:  Total IN: 0 mL    OUT:    Incontinent per Condom Catheter (mL): 1800 mL  Total OUT: 1800 mL    Total NET: -1800 mL               Discussed with:     Cultures:	        Radiology

## 2022-01-08 NOTE — PROGRESS NOTE ADULT - SUBJECTIVE AND OBJECTIVE BOX
PCP  Subjective:   in bed , awake ,     Objective:   Vital Signs Last 24 Hrs  T(C): 36.8 (01-08-22 @ 04:38), Max: 36.8 (01-08-22 @ 04:38)  T(F): 98.2 (01-08-22 @ 04:38), Max: 98.2 (01-08-22 @ 04:38)  HR: 81 (01-08-22 @ 04:38) (81 - 99)  BP: 126/77 (01-08-22 @ 04:38) (110/65 - 126/77)  BP(mean): --  RR: 18 (01-08-22 @ 04:38) (18 - 20)  SpO2: 91% (01-08-22 @ 04:38) (91% - 91%)  Daily     Daily     GENERAL:  wdwn male ,weak ., awake ,  EYES: eomi  NECK: supple  CHEST/LUNG: rales , bilateral   HEART: s1 s2 regular  ABDOMEN:  soft nt + bs  EXTREMITIES:  no edema  SKIN:  warm , pale , weak   CNS:  awake , alert ,   non focal,  weak ,       Allergies: Allergies    clindamycin (Unknown)  Levaquin (Unknown)    Intolerances        Home Medications:  acetaminophen 325 mg oral tablet: 2 tab(s) orally every 6 hours, As needed, for pain (30 Dec 2021 10:54)  aspirin 81 mg oral tablet, chewable: 1 tab(s) orally once a day (30 Dec 2021 10:54)  heparin: 5000 unit(s) subcutaneous 2 times a day (05 Jan 2022 09:13)  Milk of Magnesia 8% oral suspension: 30 milliliter(s) orally once a day, As Needed if no bowel movement in 6 consecutive shifts (30 Dec 2021 10:54)  mirtazapine 15 mg oral tablet: 1 tab(s) orally once a day (at bedtime) (30 Dec 2021 10:54)  Multiple Vitamins oral tablet: 1 tab(s) orally once a day (05 Jan 2022 09:13)  tamsulosin 0.4 mg oral capsule: 1 cap(s) orally once a day (at bedtime) (30 Dec 2021 10:54)    Medications:   acetaminophen     Tablet .. 650 milliGRAM(s) Oral every 6 hours PRN  aspirin  chewable 81 milliGRAM(s) Oral daily  dexAMETHasone     Tablet 6 milliGRAM(s) Oral daily  dextrose 5% with potassium chloride 20 mEq/L 1000 milliLiter(s) IV Continuous <Continuous>  guaiFENesin Oral Liquid (Sugar-Free) 200 milliGRAM(s) Oral every 6 hours PRN  heparin   Injectable 5000 Unit(s) SubCutaneous every 8 hours  lactobacillus acidophilus 1 Tablet(s) Oral two times a day  multivitamin 1 Tablet(s) Oral daily  tamsulosin 0.4 milliGRAM(s) Oral at bedtime      LABS:                        8.3    10.59 )-----------( 195      ( 08 Jan 2022 07:13 )             24.3     01-08    140  |  112<H>  |  44<H>  ----------------------------<  93  4.2   |  21<L>  |  2.10<H>    Ca    7.6<L>      08 Jan 2022 07:13  Mg     1.9     01-07              CAPILLARY BLOOD GLUCOSE              RECENT CULTURES:

## 2022-01-08 NOTE — PROGRESS NOTE ADULT - ASSESSMENT
89 yo M w/ PMHx of Parkinson's Disease (on no medications), CAD (s/p stent), Bladder cancer    covid  ckd  htn  OP  OA  eval for Dysphagia  hypoxemia  ? PNA     RUE - neg for DVT  heme occult neg  procalcitonin - crp - noted  labs reviewed  PO intake  ID f/u noted  s/p Zosyn    SLP eval noted  Parkinson's hx - risk for aspiration  Covid - hypoxemia - remdesivir and decadron  monitor VS and HD and Sat  serial D dimer  DVT p  assist with needs - ADL  isolation precs  assist with needs - ADL  GOC - DNR  prognosis guarded  old records reviewed

## 2022-01-09 RX ORDER — MIRTAZAPINE 45 MG/1
7.5 TABLET, ORALLY DISINTEGRATING ORAL DAILY
Refills: 0 | Status: DISCONTINUED | OUTPATIENT
Start: 2022-01-09 | End: 2022-01-10

## 2022-01-09 RX ADMIN — Medication 81 MILLIGRAM(S): at 15:37

## 2022-01-09 RX ADMIN — TAMSULOSIN HYDROCHLORIDE 0.4 MILLIGRAM(S): 0.4 CAPSULE ORAL at 21:21

## 2022-01-09 RX ADMIN — HEPARIN SODIUM 5000 UNIT(S): 5000 INJECTION INTRAVENOUS; SUBCUTANEOUS at 21:21

## 2022-01-09 RX ADMIN — Medication 1 TABLET(S): at 05:25

## 2022-01-09 RX ADMIN — HEPARIN SODIUM 5000 UNIT(S): 5000 INJECTION INTRAVENOUS; SUBCUTANEOUS at 05:24

## 2022-01-09 RX ADMIN — Medication 6 MILLIGRAM(S): at 05:24

## 2022-01-09 RX ADMIN — Medication 1 TABLET(S): at 15:37

## 2022-01-09 RX ADMIN — HEPARIN SODIUM 5000 UNIT(S): 5000 INJECTION INTRAVENOUS; SUBCUTANEOUS at 15:37

## 2022-01-09 NOTE — PROGRESS NOTE ADULT - ASSESSMENT
89 yo M w/ PMHx of Parkinson's Disease (on no medications), CAD (s/p stent), Bladder cancer    covid  ckd  htn  OP  OA  eval for Dysphagia  hypoxemia  ? PNA     Serum Na improved -   RUE - neg for DVT  heme occult neg  procalcitonin - crp - noted  labs reviewed  PO intake  ID f/u noted  s/p Zosyn    SLP eval noted  Parkinson's hx - risk for aspiration  Covid - hypoxemia - remdesivir and decadron  monitor VS and HD and Sat  serial D dimer  DVT p  assist with needs - ADL  isolation precs  assist with needs - ADL  GOC - DNR  prognosis guarded  old records reviewed

## 2022-01-09 NOTE — PROGRESS NOTE ADULT - SUBJECTIVE AND OBJECTIVE BOX
Date/Time Patient Seen:  		  Referring MD:   Data Reviewed	       Patient is a 89y old  Male who presents with a chief complaint of fever    weakness (08 Jan 2022 13:35)      Subjective/HPI     PAST MEDICAL & SURGICAL HISTORY:  Shoulder fracture    Bladder cancer    Stented coronary artery  over 15 years ago as per patient    Parkinsons    S/P cardiac catheterization          Medication list         MEDICATIONS  (STANDING):  aspirin  chewable 81 milliGRAM(s) Oral daily  heparin   Injectable 5000 Unit(s) SubCutaneous every 8 hours  lactobacillus acidophilus 1 Tablet(s) Oral two times a day  multivitamin 1 Tablet(s) Oral daily  tamsulosin 0.4 milliGRAM(s) Oral at bedtime    MEDICATIONS  (PRN):  acetaminophen     Tablet .. 650 milliGRAM(s) Oral every 6 hours PRN Temp greater or equal to 38C (100.4F), Mild Pain (1 - 3)  guaiFENesin Oral Liquid (Sugar-Free) 200 milliGRAM(s) Oral every 6 hours PRN Cough         Vitals log        ICU Vital Signs Last 24 Hrs  T(C): 36.7 (09 Jan 2022 04:28), Max: 37.1 (08 Jan 2022 20:45)  T(F): 98 (09 Jan 2022 04:28), Max: 98.7 (08 Jan 2022 20:45)  HR: 84 (09 Jan 2022 04:28) (68 - 98)  BP: 117/76 (09 Jan 2022 04:28) (117/76 - 129/76)  BP(mean): --  ABP: --  ABP(mean): --  RR: 18 (09 Jan 2022 04:28) (18 - 20)  SpO2: 95% (09 Jan 2022 04:28) (91% - 97%)           Input and Output:  I&O's Detail    07 Jan 2022 07:01  -  08 Jan 2022 07:00  --------------------------------------------------------  IN:  Total IN: 0 mL    OUT:    Incontinent per Condom Catheter (mL): 2300 mL  Total OUT: 2300 mL    Total NET: -2300 mL      08 Jan 2022 07:01  -  09 Jan 2022 06:10  --------------------------------------------------------  IN:  Total IN: 0 mL    OUT:    Voided (mL): 550 mL  Total OUT: 550 mL    Total NET: -550 mL          Lab Data                        8.3    10.59 )-----------( 195      ( 08 Jan 2022 07:13 )             24.3     01-08    140  |  112<H>  |  44<H>  ----------------------------<  93  4.2   |  21<L>  |  2.10<H>    Ca    7.6<L>      08 Jan 2022 07:13  Mg     1.9     01-07              Review of Systems	      Objective     Physical Examination  heart s1s2  lung dec BS  abd soft  head nc        Pertinent Lab findings & Imaging      Lalo:  NO   Adequate UO     I&O's Detail    07 Jan 2022 07:01  -  08 Jan 2022 07:00  --------------------------------------------------------  IN:  Total IN: 0 mL    OUT:    Incontinent per Condom Catheter (mL): 2300 mL  Total OUT: 2300 mL    Total NET: -2300 mL      08 Jan 2022 07:01  -  09 Jan 2022 06:10  --------------------------------------------------------  IN:  Total IN: 0 mL    OUT:    Voided (mL): 550 mL  Total OUT: 550 mL    Total NET: -550 mL               Discussed with:     Cultures:	        Radiology

## 2022-01-09 NOTE — PROGRESS NOTE ADULT - SUBJECTIVE AND OBJECTIVE BOX
Subjective: NAD      MEDICATIONS  (STANDING):  aspirin  chewable 81 milliGRAM(s) Oral daily  heparin   Injectable 5000 Unit(s) SubCutaneous every 8 hours  lactobacillus acidophilus 1 Tablet(s) Oral two times a day  multivitamin 1 Tablet(s) Oral daily  tamsulosin 0.4 milliGRAM(s) Oral at bedtime    MEDICATIONS  (PRN):  acetaminophen     Tablet .. 650 milliGRAM(s) Oral every 6 hours PRN Temp greater or equal to 38C (100.4F), Mild Pain (1 - 3)  guaiFENesin Oral Liquid (Sugar-Free) 200 milliGRAM(s) Oral every 6 hours PRN Cough          T(C): 36.7 (01-09-22 @ 04:28), Max: 37.1 (01-08-22 @ 20:45)  HR: 84 (01-09-22 @ 04:28) (68 - 98)  BP: 117/76 (01-09-22 @ 04:28) (117/76 - 129/76)  RR: 18 (01-09-22 @ 04:28) (18 - 20)  SpO2: 95% (01-09-22 @ 04:28) (91% - 97%)  Wt(kg): --        I&O's Detail    08 Jan 2022 07:01  -  09 Jan 2022 07:00  --------------------------------------------------------  IN:  Total IN: 0 mL    OUT:    Voided (mL): 550 mL  Total OUT: 550 mL    Total NET: -550 mL               PHYSICAL EXAM:    GENERAL: no dyspnea  NECK: Supple, no inc in JVP  CHEST/LUNG: Clear  HEART: S1S2  ABDOMEN: Soft  EXTREMITIES:        LABS:  CBC Full  -  ( 08 Jan 2022 07:13 )  WBC Count : 10.59 K/uL  RBC Count : 2.71 M/uL  Hemoglobin : 8.3 g/dL  Hematocrit : 24.3 %  Platelet Count - Automated : 195 K/uL  Mean Cell Volume : 89.7 fl  Mean Cell Hemoglobin : 30.6 pg  Mean Cell Hemoglobin Concentration : 34.2 gm/dL  Auto Neutrophil # : x  Auto Lymphocyte # : x  Auto Monocyte # : x  Auto Eosinophil # : x  Auto Basophil # : x  Auto Neutrophil % : x  Auto Lymphocyte % : x  Auto Monocyte % : x  Auto Eosinophil % : x  Auto Basophil % : x    01-08    140  |  112<H>  |  44<H>  ----------------------------<  93  4.2   |  21<L>  |  2.10<H>    Ca    7.6<L>      08 Jan 2022 07:13              ASSESSMENT:  1.  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 Pre-renal. Improved.   2.  Hypokalemia, resolved  3.  Hypernatremia, corrected.   4. COVID 19    PLAN:  Monitor off IVFs  BMP in am.

## 2022-01-09 NOTE — PROGRESS NOTE ADULT - SUBJECTIVE AND OBJECTIVE BOX
PCP  Subjective:   in bed , awake , weak    Objective:   Vital Signs Last 24 Hrs  T(C): 36.8 (22 @ 14:54), Max: 37.1 (22 @ 20:45)  T(F): 98.2 (22 @ 14:54), Max: 98.7 (22 @ 20:45)  HR: 91 (22 @ 14:54) (84 - 98)  BP: 127/84 (22 @ 14:54) (117/76 - 127/84)  BP(mean): --  RR: 20 (22 @ 14:54) (18 - 20)  SpO2: 97% (22 @ 14:54) (91% - 97%)  Daily     Daily Weight in k.5 (2022 04:28)    GENERAL:  wdwn male ,weak ., awake ,  EYES: eomi  NECK: supple  CHEST/LUNG: rales , bilateral   HEART: s1 s2 regular  ABDOMEN:  soft nt + bs  EXTREMITIES:  no edema  SKIN:  warm , pale , weak   CNS:  awake , alert ,   non focal,  weak ,       Allergies: Allergies    clindamycin (Unknown)  Levaquin (Unknown)    Intolerances        Home Medications:  acetaminophen 325 mg oral tablet: 2 tab(s) orally every 6 hours, As needed, for pain (30 Dec 2021 10:54)  aspirin 81 mg oral tablet, chewable: 1 tab(s) orally once a day (30 Dec 2021 10:54)  heparin: 5000 unit(s) subcutaneous 2 times a day (2022 09:13)  Milk of Magnesia 8% oral suspension: 30 milliliter(s) orally once a day, As Needed if no bowel movement in 6 consecutive shifts (30 Dec 2021 10:54)  mirtazapine 15 mg oral tablet: 1 tab(s) orally once a day (at bedtime) (30 Dec 2021 10:54)  Multiple Vitamins oral tablet: 1 tab(s) orally once a day (2022 09:13)  tamsulosin 0.4 mg oral capsule: 1 cap(s) orally once a day (at bedtime) (30 Dec 2021 10:54)    Medications:   acetaminophen     Tablet .. 650 milliGRAM(s) Oral every 6 hours PRN  aspirin  chewable 81 milliGRAM(s) Oral daily  guaiFENesin Oral Liquid (Sugar-Free) 200 milliGRAM(s) Oral every 6 hours PRN  heparin   Injectable 5000 Unit(s) SubCutaneous every 8 hours  lactobacillus acidophilus 1 Tablet(s) Oral two times a day  multivitamin 1 Tablet(s) Oral daily  tamsulosin 0.4 milliGRAM(s) Oral at bedtime      LABS:                        8.3    10.59 )-----------( 195      ( 2022 07:13 )             24.3     01-08    140  |  112<H>  |  44<H>  ----------------------------<  93  4.2   |  21<L>  |  2.10<H>    Ca    7.6<L>      2022 07:13              CAPILLARY BLOOD GLUCOSE              RECENT CULTURES:

## 2022-01-09 NOTE — PROGRESS NOTE ADULT - ASSESSMENT
Chief Complaint: shortness of breath.    · Chief Complaint: The patient is a 89y Male complaining of shortness of breath.  · HPI Objective Statement: 89yo M w/ PMHx of Parkinson's Disease (on no medications), CAD (s/p stent), Bladder cancer (s/p tumor resection) sent from [facility] for pna, lethargy, fevers.  pt currently on ABx cefepime, doxy, flagyl for sacral ulcer and colitis, has xr showing bl pna. sent to ed  pt does not provide history      PAST MEDICAL/SURGICAL/FAMILY/SOCIAL HISTORY:    Past Medical, Past Surgical, and Family History:  PAST MEDICAL HISTORY:  Bladder cancer ,Parkinsons ,Shoulder fracture ,Stented coronary artery over 15 years ago as per patient.     PAST SURGICAL HISTORY:  S/P cardiac catheterization.     on 1.2.2022 plan as per ID:  COVID-19 PNA +/- superimposed bacterial PNA  AHRF on O2  Fevers  Leukopenia  COVID-19-high risk for decompensation EARLY INFLAMMATORY PHASE (7-14 days post symptom onset)   -c/w remdesivir x5 day course  -c/w steroids x10 day course  -infectious w/u negative to date  -imaging reviewed--CXR basilar consolidations  -trend biomarkers  -trend temps/WBC  --fevers noted, likely course of disease, will c/w monitoring  -maintain aspiration precautions  -isolation precautions per infection control policies  -Would c/w zosyn for possible superimposed bacterial PNA---x5-7 day course pending clinical improvement    Colitis/sacral ulcer  -on zosyn as above, no need to c/w abx from NH    Infectious Diseases will continue to follow. Please call with any questions.

## 2022-01-10 ENCOUNTER — TRANSCRIPTION ENCOUNTER (OUTPATIENT)
Age: 87
End: 2022-01-10

## 2022-01-10 VITALS
DIASTOLIC BLOOD PRESSURE: 81 MMHG | RESPIRATION RATE: 20 BRPM | TEMPERATURE: 98 F | HEART RATE: 113 BPM | SYSTOLIC BLOOD PRESSURE: 124 MMHG | OXYGEN SATURATION: 98 %

## 2022-01-10 LAB — SARS-COV-2 RNA SPEC QL NAA+PROBE: DETECTED

## 2022-01-10 PROCEDURE — 82272 OCCULT BLD FECES 1-3 TESTS: CPT

## 2022-01-10 PROCEDURE — 83605 ASSAY OF LACTIC ACID: CPT

## 2022-01-10 PROCEDURE — 0225U NFCT DS DNA&RNA 21 SARSCOV2: CPT

## 2022-01-10 PROCEDURE — 86140 C-REACTIVE PROTEIN: CPT

## 2022-01-10 PROCEDURE — 87635 SARS-COV-2 COVID-19 AMP PRB: CPT

## 2022-01-10 PROCEDURE — 87040 BLOOD CULTURE FOR BACTERIA: CPT

## 2022-01-10 PROCEDURE — 97161 PT EVAL LOW COMPLEX 20 MIN: CPT

## 2022-01-10 PROCEDURE — 85730 THROMBOPLASTIN TIME PARTIAL: CPT

## 2022-01-10 PROCEDURE — 92610 EVALUATE SWALLOWING FUNCTION: CPT

## 2022-01-10 PROCEDURE — 81001 URINALYSIS AUTO W/SCOPE: CPT

## 2022-01-10 PROCEDURE — 85025 COMPLETE CBC W/AUTO DIFF WBC: CPT

## 2022-01-10 PROCEDURE — 99285 EMERGENCY DEPT VISIT HI MDM: CPT

## 2022-01-10 PROCEDURE — 83735 ASSAY OF MAGNESIUM: CPT

## 2022-01-10 PROCEDURE — 84145 PROCALCITONIN (PCT): CPT

## 2022-01-10 PROCEDURE — 96374 THER/PROPH/DIAG INJ IV PUSH: CPT

## 2022-01-10 PROCEDURE — 93005 ELECTROCARDIOGRAM TRACING: CPT

## 2022-01-10 PROCEDURE — 96375 TX/PRO/DX INJ NEW DRUG ADDON: CPT

## 2022-01-10 PROCEDURE — 92526 ORAL FUNCTION THERAPY: CPT

## 2022-01-10 PROCEDURE — 97110 THERAPEUTIC EXERCISES: CPT

## 2022-01-10 PROCEDURE — 71045 X-RAY EXAM CHEST 1 VIEW: CPT

## 2022-01-10 PROCEDURE — 36415 COLL VENOUS BLD VENIPUNCTURE: CPT

## 2022-01-10 PROCEDURE — U0005: CPT

## 2022-01-10 PROCEDURE — 83880 ASSAY OF NATRIURETIC PEPTIDE: CPT

## 2022-01-10 PROCEDURE — 85027 COMPLETE CBC AUTOMATED: CPT

## 2022-01-10 PROCEDURE — 80053 COMPREHEN METABOLIC PANEL: CPT

## 2022-01-10 PROCEDURE — 87086 URINE CULTURE/COLONY COUNT: CPT

## 2022-01-10 PROCEDURE — 85610 PROTHROMBIN TIME: CPT

## 2022-01-10 PROCEDURE — 82803 BLOOD GASES ANY COMBINATION: CPT

## 2022-01-10 PROCEDURE — 85652 RBC SED RATE AUTOMATED: CPT

## 2022-01-10 PROCEDURE — 93971 EXTREMITY STUDY: CPT

## 2022-01-10 PROCEDURE — U0003: CPT

## 2022-01-10 PROCEDURE — 80048 BASIC METABOLIC PNL TOTAL CA: CPT

## 2022-01-10 RX ORDER — HEPARIN SODIUM 5000 [USP'U]/ML
5000 INJECTION INTRAVENOUS; SUBCUTANEOUS EVERY 12 HOURS
Refills: 0 | Status: DISCONTINUED | OUTPATIENT
Start: 2022-01-10 | End: 2022-01-10

## 2022-01-10 RX ADMIN — HEPARIN SODIUM 5000 UNIT(S): 5000 INJECTION INTRAVENOUS; SUBCUTANEOUS at 05:22

## 2022-01-10 RX ADMIN — Medication 1 TABLET(S): at 05:22

## 2022-01-10 RX ADMIN — HEPARIN SODIUM 5000 UNIT(S): 5000 INJECTION INTRAVENOUS; SUBCUTANEOUS at 17:29

## 2022-01-10 RX ADMIN — Medication 1 TABLET(S): at 17:29

## 2022-01-10 RX ADMIN — MIRTAZAPINE 7.5 MILLIGRAM(S): 45 TABLET, ORALLY DISINTEGRATING ORAL at 13:49

## 2022-01-10 RX ADMIN — Medication 81 MILLIGRAM(S): at 11:32

## 2022-01-10 RX ADMIN — Medication 1 TABLET(S): at 11:32

## 2022-01-10 NOTE — PROGRESS NOTE ADULT - SUBJECTIVE AND OBJECTIVE BOX
Date/Time Patient Seen:  		  Referring MD:   Data Reviewed	       Patient is a 89y old  Male who presents with a chief complaint of fever    weakness (09 Jan 2022 16:36)      Subjective/HPI     PAST MEDICAL & SURGICAL HISTORY:  Shoulder fracture    Bladder cancer    Stented coronary artery  over 15 years ago as per patient    Parkinsons    S/P cardiac catheterization          Medication list         MEDICATIONS  (STANDING):  aspirin  chewable 81 milliGRAM(s) Oral daily  heparin   Injectable 5000 Unit(s) SubCutaneous every 8 hours  lactobacillus acidophilus 1 Tablet(s) Oral two times a day  mirtazapine 7.5 milliGRAM(s) Oral daily  multivitamin 1 Tablet(s) Oral daily  tamsulosin 0.4 milliGRAM(s) Oral at bedtime    MEDICATIONS  (PRN):  acetaminophen     Tablet .. 650 milliGRAM(s) Oral every 6 hours PRN Temp greater or equal to 38C (100.4F), Mild Pain (1 - 3)  guaiFENesin Oral Liquid (Sugar-Free) 200 milliGRAM(s) Oral every 6 hours PRN Cough         Vitals log        ICU Vital Signs Last 24 Hrs  T(C): 36.1 (10 Almas 2022 04:44), Max: 36.8 (09 Jan 2022 14:54)  T(F): 97 (10 Almas 2022 04:44), Max: 98.2 (09 Jan 2022 14:54)  HR: 92 (10 Almas 2022 04:44) (91 - 103)  BP: 114/83 (10 Almas 2022 04:44) (101/66 - 127/84)  BP(mean): --  ABP: --  ABP(mean): --  RR: 19 (10 Almas 2022 04:44) (19 - 20)  SpO2: 96% (10 Almas 2022 04:44) (96% - 97%)           Input and Output:  I&O's Detail    08 Jan 2022 07:01  -  09 Jan 2022 07:00  --------------------------------------------------------  IN:  Total IN: 0 mL    OUT:    Voided (mL): 550 mL  Total OUT: 550 mL    Total NET: -550 mL      09 Jan 2022 07:01  -  10 Almas 2022 05:28  --------------------------------------------------------  IN:  Total IN: 0 mL    OUT:    Incontinent per Condom Catheter (mL): 850 mL  Total OUT: 850 mL    Total NET: -850 mL          Lab Data                        8.3    10.59 )-----------( 195      ( 08 Jan 2022 07:13 )             24.3     01-08    140  |  112<H>  |  44<H>  ----------------------------<  93  4.2   |  21<L>  |  2.10<H>    Ca    7.6<L>      08 Jan 2022 07:13              Review of Systems	      Objective     Physical Examination    heart s1s2  lung dec BS  abd soft      Pertinent Lab findings & Imaging      Lalo:  NO   Adequate UO     I&O's Detail    08 Jan 2022 07:01  -  09 Jan 2022 07:00  --------------------------------------------------------  IN:  Total IN: 0 mL    OUT:    Voided (mL): 550 mL  Total OUT: 550 mL    Total NET: -550 mL      09 Jan 2022 07:01  -  10 Almas 2022 05:28  --------------------------------------------------------  IN:  Total IN: 0 mL    OUT:    Incontinent per Condom Catheter (mL): 850 mL  Total OUT: 850 mL    Total NET: -850 mL               Discussed with:     Cultures:	        Radiology

## 2022-01-10 NOTE — PROGRESS NOTE ADULT - PROVIDER SPECIALTY LIST ADULT
Internal Medicine
Internal Medicine
Nephrology
Infectious Disease
Internal Medicine
Infectious Disease
Internal Medicine
Nephrology
Pulmonology
Infectious Disease
Internal Medicine
Nephrology
Pulmonology
Pulmonology
Internal Medicine

## 2022-01-10 NOTE — PROGRESS NOTE ADULT - SUBJECTIVE AND OBJECTIVE BOX
Patient is a 89y old  Male who presents with a chief complaint of fever    weakness (05 Jan 2022 10:14)    Patient seen in follow up for hypernatremia, SMITH.        PAST MEDICAL HISTORY:  Shoulder fracture    Bladder cancer    Stented coronary artery    Parkinsons      MEDICATIONS  (STANDING):  aspirin  chewable 81 milliGRAM(s) Oral daily  heparin   Injectable 5000 Unit(s) SubCutaneous every 12 hours  lactobacillus acidophilus 1 Tablet(s) Oral two times a day  mirtazapine 7.5 milliGRAM(s) Oral daily  multivitamin 1 Tablet(s) Oral daily  tamsulosin 0.4 milliGRAM(s) Oral at bedtime    MEDICATIONS  (PRN):  acetaminophen     Tablet .. 650 milliGRAM(s) Oral every 6 hours PRN Temp greater or equal to 38C (100.4F), Mild Pain (1 - 3)  guaiFENesin Oral Liquid (Sugar-Free) 200 milliGRAM(s) Oral every 6 hours PRN Cough    T(C): 36.7 (01-10-22 @ 12:46), Max: 37.1 (01-08-22 @ 20:45)  HR: 113 (01-10-22 @ 12:46) (84 - 113)  BP: 124/81 (01-10-22 @ 12:46) (101/66 - 127/84)  RR: 20 (01-10-22 @ 12:46)  SpO2: 98% (01-10-22 @ 12:46)  Wt(kg): --  I&O's Detail    09 Jan 2022 07:01  -  10 Almas 2022 07:00  --------------------------------------------------------  IN:  Total IN: 0 mL    OUT:    Incontinent per Condom Catheter (mL): 1350 mL  Total OUT: 1350 mL    Total NET: -1350 mL                PHYSICAL EXAM:  General: No distress  Respiratory: b/l air entry  Cardiovascular: S1 S2  Gastrointestinal: soft  Extremities:  no edema            LABORATORY:              Hemoglobin: 8.3 g/dL (01-08 @ 07:13)    Creatinine, Serum 2.10 (01-08 @ 07:13)

## 2022-01-10 NOTE — DISCHARGE NOTE NURSING/CASE MANAGEMENT/SOCIAL WORK - NSDCPETBCESMAN_GEN_ALL_CORE
If you are a smoker, it is important for your health to stop smoking. Please be aware that second hand smoke is also harmful. Undermining Type: Entire Wound

## 2022-01-10 NOTE — PROGRESS NOTE ADULT - NUTRITIONAL ASSESSMENT
This patient has been assessed with a concern for Malnutrition and has been determined to have a diagnosis/diagnoses of Severe protein-calorie malnutrition and Underweight (BMI < 19).    This patient is being managed with:   Diet Full Liquid-  Entered: Dec 30 2021 10:44AM    
This patient has been assessed with a concern for Malnutrition and has been determined to have a diagnosis/diagnoses of Severe protein-calorie malnutrition and Underweight (BMI < 19).    This patient is being managed with:   Diet Full Liquid-  Entered: Dec 30 2021 10:44AM    
This patient has been assessed with a concern for Malnutrition and has been determined to have a diagnosis/diagnoses of Severe protein-calorie malnutrition and Underweight (BMI < 19).    This patient is being managed with:   Diet Pureed-  Moderately Thick Liquids (MODTHICKLIQS)  Low Sodium  Supplement Feeding Modality:  Oral  Ensure Enlive Cans or Servings Per Day:  1       Frequency:  Two Times a day  Entered: Jan 8 2022 10:14AM    Diet Pureed-  Entered: Jan 5 2022  9:06AM    The following pending diet order is being considered for treatment of Severe protein-calorie malnutrition and Underweight (BMI < 19):null
This patient has been assessed with a concern for Malnutrition and has been determined to have a diagnosis/diagnoses of Severe protein-calorie malnutrition and Underweight (BMI < 19).    This patient is being managed with:   Diet Pureed-  Entered: Jan 5 2022  9:06AM    
This patient has been assessed with a concern for Malnutrition and has been determined to have a diagnosis/diagnoses of Severe protein-calorie malnutrition and Underweight (BMI < 19).    This patient is being managed with:   Diet Pureed-  Entered: Jan 5 2022  9:06AM    
This patient has been assessed with a concern for Malnutrition and has been determined to have a diagnosis/diagnoses of Severe protein-calorie malnutrition and Underweight (BMI < 19).    This patient is being managed with:   Diet Full Liquid-  Supplement Feeding Modality:  Oral  Ensure Enlive Cans or Servings Per Day:  1       Frequency:  Two Times a day  Ensure Pudding Cans or Servings Per Day:  1       Frequency:  Daily  Entered: Almas  3 2022 12:17PM    
This patient has been assessed with a concern for Malnutrition and has been determined to have a diagnosis/diagnoses of Severe protein-calorie malnutrition and Underweight (BMI < 19).    This patient is being managed with:   Diet Full Liquid-  Entered: Dec 30 2021 10:44AM    
This patient has been assessed with a concern for Malnutrition and has been determined to have a diagnosis/diagnoses of Severe protein-calorie malnutrition and Underweight (BMI < 19).    This patient is being managed with:   Diet Pureed-  Entered: Jan 5 2022  9:06AM    
This patient has been assessed with a concern for Malnutrition and has been determined to have a diagnosis/diagnoses of Severe protein-calorie malnutrition and Underweight (BMI < 19).    This patient is being managed with:   Diet Pureed-  Moderately Thick Liquids (MODTHICKLIQS)  Low Sodium  Supplement Feeding Modality:  Oral  Ensure Enlive Cans or Servings Per Day:  1       Frequency:  Two Times a day  Entered: Jan 8 2022 10:14AM    Diet Pureed-  Entered: Jan 5 2022  9:06AM    The following pending diet order is being considered for treatment of Severe protein-calorie malnutrition and Underweight (BMI < 19):null
This patient has been assessed with a concern for Malnutrition and has been determined to have a diagnosis/diagnoses of Severe protein-calorie malnutrition and Underweight (BMI < 19).    This patient is being managed with:   Diet Pureed-  Moderately Thick Liquids (MODTHICKLIQS)  Low Sodium  Supplement Feeding Modality:  Oral  Ensure Enlive Cans or Servings Per Day:  1       Frequency:  Two Times a day  Entered: Jan 8 2022 10:14AM    Diet Pureed-  Entered: Jan 5 2022  9:06AM    The following pending diet order is being considered for treatment of Severe protein-calorie malnutrition and Underweight (BMI < 19):null

## 2022-01-10 NOTE — PROGRESS NOTE ADULT - ASSESSMENT
Chief Complaint: shortness of breath.    · Chief Complaint: The patient is a 89y Male complaining of shortness of breath.  · HPI Objective Statement: 89yo M w/ PMHx of Parkinson's Disease (on no medications), CAD (s/p stent), Bladder cancer (s/p tumor resection) sent from [facility] for pna, lethargy, fevers.  pt currently on ABx cefepime, doxy, flagyl for sacral ulcer and colitis, has xr showing bl pna. sent to ed  pt does not provide history      PAST MEDICAL/SURGICAL/FAMILY/SOCIAL HISTORY:    Past Medical, Past Surgical, and Family History:  PAST MEDICAL HISTORY:  Bladder cancer ,Parkinsons ,Shoulder fracture ,Stented coronary artery over 15 years ago as per patient.     PAST SURGICAL HISTORY:  S/P cardiac catheterization.     on 1.2.2022 plan as per ID:  COVID-19 PNA +/- superimposed bacterial PNA  AHRF on O2  Fevers  Leukopenia  COVID-19-high risk for decompensation EARLY INFLAMMATORY PHASE (7-14 days post symptom onset)   -c/w remdesivir x5 day course  -c/w steroids x10 day course  -infectious w/u negative to date  -imaging reviewed--CXR basilar consolidations  -trend biomarkers  -trend temps/WBC  --fevers noted, likely course of disease, will c/w monitoring  -maintain aspiration precautions  -isolation precautions per infection control policies  -Would c/w zosyn for possible superimposed bacterial PNA---x5-7 day course pending clinical improvement    Colitis/sacral ulcer  -on zosyn as above, no need to c/w abx from NH    Infectious Diseases will continue to follow. Please call with any questions.

## 2022-01-10 NOTE — DISCHARGE NOTE NURSING/CASE MANAGEMENT/SOCIAL WORK - PATIENT PORTAL LINK FT
You can access the FollowMyHealth Patient Portal offered by [facility] by registering at the following website: [email or web address]. By joining [facility]’s FollowMyHealth portal, you will also be able to view your health information using other applications (apps) compatible with our system.

## 2022-01-10 NOTE — PROGRESS NOTE ADULT - REASON FOR ADMISSION
fever    weakness

## 2022-01-10 NOTE — DISCHARGE NOTE NURSING/CASE MANAGEMENT/SOCIAL WORK - NSDCPEFALRISK_GEN_ALL_CORE
For information on Fall & Injury Prevention, visit: [email or web address] OR  [email or web address] OR  https://www.cdc.gov/steadi/patient.html

## 2022-01-10 NOTE — PROGRESS NOTE ADULT - ASSESSMENT
89 yo M w/ PMHx of Parkinson's Disease (on no medications), CAD (s/p stent), Bladder cancer    covid  ckd  htn  OP  OA  eval for Dysphagia  hypoxemia  ? PNA     remeron added -   Serum Na improved -   RUE - neg for DVT  heme occult neg  procalcitonin - crp - noted  labs reviewed  PO intake  ID f/u noted  s/p Zosyn    SLP eval noted  Parkinson's hx - risk for aspiration  Covid - hypoxemia - remdesivir and decadron  monitor VS and HD and Sat  serial D dimer  DVT p  assist with needs - ADL  isolation precs  assist with needs - ADL  GOC - DNR  prognosis guarded  old records reviewed

## 2022-01-10 NOTE — PROGRESS NOTE ADULT - SUBJECTIVE AND OBJECTIVE BOX
PCP  Subjective:   in bed , awake weak    Objective:   Vital Signs Last 24 Hrs  T(C): 36.1 (01-10-22 @ 04:44), Max: 36.8 (22 @ 14:54)  T(F): 97 (01-10-22 @ 04:44), Max: 98.2 (22 @ 14:54)  HR: 92 (01-10-22 @ 04:44) (91 - 103)  BP: 114/83 (01-10-22 @ 04:44) (101/66 - 127/84)  BP(mean): --  RR: 19 (01-10-22 @ 04:44) (19 - 20)  SpO2: 96% (01-10-22 @ 04:44) (96% - 97%)  Daily     Daily Weight in k.4 (10 Almas 2022 04:44)      GENERAL:  wdwn male ,weak ., awake ,  EYES: eomi  NECK: supple  CHEST/LUNG: rales , bilateral   HEART: s1 s2 regular  ABDOMEN:  soft nt + bs  EXTREMITIES:  no edema  SKIN:  warm , pale , weak   CNS:  awake , alert ,   non focal,  weak ,   Allergies: Allergies    clindamycin (Unknown)  Levaquin (Unknown)    Intolerances        Home Medications:  acetaminophen 325 mg oral tablet: 2 tab(s) orally every 6 hours, As needed, for pain (30 Dec 2021 10:54)  aspirin 81 mg oral tablet, chewable: 1 tab(s) orally once a day (30 Dec 2021 10:54)  heparin: 5000 unit(s) subcutaneous 2 times a day (2022 09:13)  Milk of Magnesia 8% oral suspension: 30 milliliter(s) orally once a day, As Needed if no bowel movement in 6 consecutive shifts (30 Dec 2021 10:54)  mirtazapine 15 mg oral tablet: 1 tab(s) orally once a day (at bedtime) (30 Dec 2021 10:54)  Multiple Vitamins oral tablet: 1 tab(s) orally once a day (2022 09:13)  tamsulosin 0.4 mg oral capsule: 1 cap(s) orally once a day (at bedtime) (30 Dec 2021 10:54)    Medications:   acetaminophen     Tablet .. 650 milliGRAM(s) Oral every 6 hours PRN  aspirin  chewable 81 milliGRAM(s) Oral daily  guaiFENesin Oral Liquid (Sugar-Free) 200 milliGRAM(s) Oral every 6 hours PRN  heparin   Injectable 5000 Unit(s) SubCutaneous every 8 hours  lactobacillus acidophilus 1 Tablet(s) Oral two times a day  mirtazapine 7.5 milliGRAM(s) Oral daily  multivitamin 1 Tablet(s) Oral daily  tamsulosin 0.4 milliGRAM(s) Oral at bedtime      LABS:                  CAPILLARY BLOOD GLUCOSE              RECENT CULTURES:

## 2023-01-04 NOTE — PROGRESS NOTE ADULT - SUBJECTIVE AND OBJECTIVE BOX
Occupational Therapy    Visit Type: treatment  Precautions:  Medical precautions:  fall risk;. 78yo male with L sided weakness, slurred speech, /138 admitted to [facility] 11/25/22. + R BG [facility]  To 6W [facility] 11/25/22    Therapist wearing surgical mask during entire session. Patient was wearing mask throughout duration of therapy session.   Lines:       Lines in chart and on patient reviewed, precautions maintained throughout session.  Safety Measures: chair alarm and bed alarm  SUBJECTIVE  Patient agreed to participate in therapy this date.  Patient / Family Goal: maximize function and return home     OBJECTIVE     Cognitive Status   Level of Consciousness   - alert  Affect/Behavior    - cooperative and pleasant  Orientation    - Oriented to: person, place, time and situation  Functional Communication   - Overall Status: within functional limits  Safety Awareness/Insight   - decreased awareness of need for safety and impaired    Pt activity tolerance: Activity is adequate for this intervention.                    Therapeutic Exercise  Wrist AROM/gentle strengthening w use of blue wedge.  Pt is able to move 1/2 range, 10 reps / 2 sets completed.   Training for AAROM of wrist in preparation for increased control of L UE during ADL performance.  Pt is able to complete return demo.  Handout provided. Pt may benefit from cont training for AAROM.    Neuromuscular Re-Education  Functional reaching, low/high planes in diagonal pattern. Pt challenged for gross grasping pattern to access foam dice.  Pt bianca well.            ASSESSMENT  Impairments: pain, coordination/proprioception, activity tolerance, strength and safety awareness  Functional Limitations: participating in meaningful/purposeful activities, functional mobility, toileting, IADLs, showering, functional transfers and dressing  Pt continues to presents with limitations with ADLs/IADLS such as LB dressing tasks. Pt remains below baseline & Pt continues to  PCP  Subjective:   in bed , awake very weak    Objective:   Vital Signs Last 24 Hrs  T(C): 36.7 (01-03-22 @ 05:01), Max: 38.7 (01-02-22 @ 12:08)  T(F): 98.1 (01-03-22 @ 05:01), Max: 101.7 (01-02-22 @ 12:08)  HR: 107 (01-03-22 @ 05:01) (88 - 107)  BP: 126/75 (01-03-22 @ 05:01) (119/67 - 131/72)  BP(mean): --  RR: 18 (01-03-22 @ 05:01) (18 - 18)  SpO2: 92% (01-03-22 @ 05:01) (90% - 92%)  Daily     Daily     GENERAL:  wdwn male ,weak ., awake ,  EYES: eomi  NECK: supple  CHEST/LUNG: rales , bilateral   HEART: s1 s2 regular  ABDOMEN:  soft nt + bs  EXTREMITIES:  no edema  SKIN:  warm , pale , weak   CNS:  awake , alert ,   non focal,  weak ,       Allergies: Allergies    clindamycin (Unknown)  Levaquin (Unknown)    Intolerances        Home Medications:  acetaminophen 325 mg oral tablet: 2 tab(s) orally every 6 hours, As needed, for pain (30 Dec 2021 10:54)  Acidophilus with Pectin oral capsule: 1 cap(s) orally 2 times a day (30 Dec 2021 10:54)  aspirin 81 mg oral tablet, chewable: 1 tab(s) orally once a day (30 Dec 2021 10:54)  cefpodoxime 200 mg oral tablet: 1 tab(s) orally every 12 hours for 7 days    *Start 12/28/2021 (30 Dec 2021 10:54)  Dulcolax Laxative 10 mg rectal suppository: 1 suppository(ies) rectal once a day, As Needed if no bowel movement results after M.O.M. (30 Dec 2021 10:54)  ferrous sulfate 325 mg (65 mg elemental iron) oral tablet: 1 tab(s) orally 2 times a day (30 Dec 2021 10:54)  Fleet Enema 19 g-7 g rectal enema: 133 milliliter(s) rectal once a day, As Needed if no bowel movement results for 4-8 hours after Dulcolax sup (30 Dec 2021 10:54)  Icy Hot Extra Strength 5% topical pad: Apply topically once a day to lower back (30 Dec 2021 10:54)  Milk of Magnesia 8% oral suspension: 30 milliliter(s) orally once a day, As Needed if no bowel movement in 6 consecutive shifts (30 Dec 2021 10:54)  mirtazapine 15 mg oral tablet: 1 tab(s) orally once a day (at bedtime) (30 Dec 2021 10:54)  oxyCODONE 5 mg oral tablet: 1 tab(s) orally every 8 hours, As Needed pain (30 Dec 2021 10:54)  tamsulosin 0.4 mg oral capsule: 1 cap(s) orally once a day (at bedtime) (30 Dec 2021 10:54)    Medications:   acetaminophen     Tablet .. 650 milliGRAM(s) Oral every 6 hours PRN  aspirin  chewable 81 milliGRAM(s) Oral daily  dexAMETHasone     Tablet 6 milliGRAM(s) Oral daily  enoxaparin Injectable 30 milliGRAM(s) SubCutaneous daily  guaiFENesin Oral Liquid (Sugar-Free) 200 milliGRAM(s) Oral every 6 hours PRN  lactobacillus acidophilus 1 Tablet(s) Oral two times a day  multivitamin 1 Tablet(s) Oral daily  piperacillin/tazobactam IVPB.. 3.375 Gram(s) IV Intermittent every 12 hours  remdesivir  IVPB 100 milliGRAM(s) IV Intermittent every 24 hours  remdesivir  IVPB   IV Intermittent   tamsulosin 0.4 milliGRAM(s) Oral at bedtime      LABS:            12-29 @ 16:38  INR 1.03        CAPILLARY BLOOD GLUCOSE              RECENT CULTURES:  Culture Results:   No growth to date. (12-30 @ 00:43)  Culture Results:   No growth to date. (12-30 @ 00:43)  Culture Results:   No growth (12-30 @ 00:38)        Culture - Blood (collected 12-30-21 @ 00:43)  Source: .Blood Blood-Peripheral  Preliminary Report (12-31-21 @ 01:02):    No growth to date.    Culture - Blood (collected 12-30-21 @ 00:43)  Source: .Blood Blood-Peripheral  Preliminary Report (12-31-21 @ 01:02):    No growth to date.    Culture - Urine (collected 12-30-21 @ 00:38)  Source: Clean Catch Clean Catch (Midstream)  Final Report (12-30-21 @ 22:38):    No growth     benefit from skilled OT services.     Discharge Recommendations:  Recommendations for Discharge: OT IL: Patient requires 24 HOUR assistance to perform mobility and/or ADLs safely  PT/OT Mobility Equipment for Discharge: TBD  PT/OT ADL Equipment for Discharge: TBD - shower chair  Progress: improving as expected and progressing toward goals    • Skilled therapy is required to address these limitations in attempt to maximize the patient's independence.    Education:   - Present and ready to learn: patient and patient's significant other  Education provided during session:  - Stroke Education: adapting to stroke  - Results of above outlined education: Verbalizes understanding    Patient at End of Session:   Location: in chair  Safety measures: alarm system in place/re-engaged and call light within reach  Handoff to: nurse    PLAN  Suggestions for next session as indicated: OT Frequency: 5 days/week, 6 days/week, 7 days/week  Frequency Comments: 1-1.5hrs/day    Interventions: activity tolerance training, ADL retraining, therapeutic exercise, therapeutic activity, upper extremity strengthening/ROM, use of adaptive equipment, HEP training and transfer training  Agreement to plan and goals: patient agrees with goals and treatment plan      GOALS  Review Date: 1/9/2023  Short Term Goals (STGs): to be met 7 days from date established, unless otherwise stated.  - Patient will complete upper body dressing at moderate assist level with adaptive equipment as deemed appropriate.  STATUS: MET - Supervision with overhead, Min/Mod A with around the back shirts    - Patient will complete lower body dressing at moderate assist level with adaptive equipment as deemed appropriate.  STATUS: MET- MOD A    - Patient will complete toileting at moderate assist level with adaptive equipment as deemed appropriate.  STATUS: MET-MOD A    12/26: Patient will complete toilet transfer at MIN A level with adaptive equipment as deemed appropriate  STATUS: progressing     - Patient will complete tub transfer at maximal assist level with adaptive equipment as deemed appropriate.  STATUS: progressing/ongoing  Long Term Goals (LTGs): to be met by discharge from rehab program.  - Patient will complete grooming at supervision level with adaptive equipment as deemed appropriate.  - Patient will complete upper body dressing at contact guard assist level with adaptive equipment as deemed appropriate.  - Patient will complete lower body dressing at minimal assist level with adaptive equipment as deemed appropriate.  - Patient will complete toileting at minimal assist level with adaptive equipment as deemed appropriate.  - Patient will complete toilet transfer at contact guard assist level with adaptive equipment as deemed appropriate.  - Patient will complete tub transfer at minimal assist level with adaptive equipment as deemed appropriate.    Documented in the chart in the following areas: Pain.      Therapy procedure time and total treatment time can be found documented on the Time Entry flowsheet

## 2023-08-26 NOTE — SWALLOW BEDSIDE ASSESSMENT ADULT - SWALLOW EVAL: DIAGNOSIS
Weakness, debility and tachycardia >120s bpm.  After one unit, patient feeling better, HR remained elevated.   - continue with PRBC transfusion, 3U total  - f/u Hg/HCT    Recent Labs   Lab 08/14/23  1227 08/25/23  1234 08/26/23  0616   Hemoglobin 8.1 L 5.9 LL 9.2 L         
1- mild-moderate oral dysphagia given puree and moderately thick liquids marked by reduced stripping from utensil with delayed bolus collection, transfer and transport. 2- moderate oral dysphagia given mildly thick and thin liquids marked by reduced bolus collection, transfer and transport with suspected posterior loss over base of tongue. 3- moderate pharyngeal dysphagia given puree and moderately thick liquids marked by delayed swallow trigger and reduced hyolaryngeal excursion upon palpation. when moderately thick liquid is presented via cup, observed utilization of multiple swallows is noted, when moderately thick liquids is presented via teaspoon, there is no evidence of multiple swallows suggesting reduced pharyngeal stasis given reduced bolus size. there is no evidence of penetration/aspiration noted.  4- moderate-severe pharyngeal dysphagia given mildly thick and thin liquid textures marked by delayed swallow trigger and reduced hyolaryngeal excursion resulting in immediate
Patient provided PO trials of pureed and moderately thick liquids and demonstrated a 1. mild oral dysphagia marked by reduced stripping of bolus from utensil, anterior loss with liquids, with prolonged manipulation resulting in delayed collection, transfer, and transport. 2. severe pharyngeal dysphagia marked by suspected delayed pharyngeal swallow trigger and immediate coughing observed post trials. Patient became lethargic s/p trials and additional PO trials held. 3. Recommend oral nutrition/hydration/medication is contraindicated. Consider short-term means of non-oral nutrition/hydration/medication and GOC discussion with family regarding nutritional plan of care.

## 2023-10-06 NOTE — PATIENT PROFILE ADULT - VISION (WITH CORRECTIVE LENSES IF THE PATIENT USUALLY WEARS THEM):
(4) excellent
Normal vision: sees adequately in most situations; can see medication labels, newsprint

## 2023-11-22 NOTE — ED ADULT TRIAGE NOTE - CCCP TRG CHIEF CMPLNT
Thalidomide Pregnancy And Lactation Text: This medication is Pregnancy Category X and is absolutely contraindicated during pregnancy. It is unknown if it is excreted in breast milk. shortness of breath

## 2024-02-28 NOTE — ED ADULT NURSE NOTE - CHIEF COMPLAINT QUOTE
Patient brought in by ambulance from home as reported fell and syncopize cannot get up from the floor that's why they called for an ambulance has not seen PMD for years
Hypertensive Disorder

## 2024-05-13 NOTE — ED ADULT NURSE REASSESSMENT NOTE - NS ED NURSE REASSESS COMMENT FT1
2305 Pt awake & alert transported to [facility], at this time. (did not get to interview pt yet, just received handoff report from outgoing RN Ms CHERYL Boateng. Previously Declined (within the last year)

## 2024-06-04 NOTE — PROGRESS NOTE ADULT - ASSESSMENT
Continued Stay Note  [facility]     Patient Name: Susan Tucker  MRN: [id number]  Today's Date: 6/4/2024    Admit Date: 6/1/2024    Plan: Assisted Living with HH   Discharge Plan       Row Name 06/04/24 1335       Plan    Plan Assisted Living with HH    Plan Comments I spoke with the nurse Marlena at [facility]. She will reach out to the director to see if the pt can return without an onsight evaluation.    Final Discharge Disposition Code 06 - home with home health care                   Discharge Codes    No documentation.                 Expected Discharge Date and Time       Expected Discharge Date Expected Discharge Time    Jun 5, 2024               Saray Maldonado RN     Chief Complaint: shortness of breath.    · Chief Complaint: The patient is a 89y Male complaining of shortness of breath.  · HPI Objective Statement: 89yo M w/ PMHx of Parkinson's Disease (on no medications), CAD (s/p stent), Bladder cancer (s/p tumor resection) sent from [facility] for pna, lethargy, fevers.  pt currently on ABx cefepime, doxy, flagyl for sacral ulcer and colitis, has xr showing bl pna. sent to ed  pt does not provide history      PAST MEDICAL/SURGICAL/FAMILY/SOCIAL HISTORY:    Past Medical, Past Surgical, and Family History:  PAST MEDICAL HISTORY:  Bladder cancer ,Parkinsons ,Shoulder fracture ,Stented coronary artery over 15 years ago as per patient.     PAST SURGICAL HISTORY:  S/P cardiac catheterization.     on 1.2.2022 plan as per ID:  COVID-19 PNA +/- superimposed bacterial PNA  AHRF on O2  Fevers  Leukopenia  COVID-19-high risk for decompensation EARLY INFLAMMATORY PHASE (7-14 days post symptom onset)   -c/w remdesivir x5 day course  -c/w steroids x10 day course  -infectious w/u negative to date  -imaging reviewed--CXR basilar consolidations  -trend biomarkers  -trend temps/WBC  --fevers noted, likely course of disease, will c/w monitoring  -maintain aspiration precautions  -isolation precautions per infection control policies  -Would c/w zosyn for possible superimposed bacterial PNA---x5-7 day course pending clinical improvement    Colitis/sacral ulcer  -on zosyn as above, no need to c/w abx from NH    Infectious Diseases will continue to follow. Please call with any questions.

## 2024-07-03 NOTE — ED ADULT TRIAGE NOTE - CHIEF COMPLAINT QUOTE
23 year old male pt comes to the ED via [facility] for a CC Back pain, fever, nausea, constipation. Pt states that this back pain has been going on for a week along with a cough and all other symptoms and rates the pain a 7. Pt was almost in tears coming in because his lower back hurts so much. Pt had some mag citrate and said he lost 10 pounds after that.  
Patient brought in by ambulance from home as reported fell and syncopize cannot get up from the floor that's why they called for an ambulance has not seen PMD for years

## 2024-10-16 NOTE — ED ADULT NURSE NOTE - GASTROINTESTINAL WDL
Take inderal 60 mg la daily with 1/2 tablet of 40 gm daily or 40 gm every other day .     If doing well , can start on Inderal 80 mg la daily . If lighthheadness go back to Inderal lA 60 mg .     Amantadine same dose     
Abdomen soft, nontender, nondistended, bowel sounds present in all 4 quadrants.